# Patient Record
Sex: FEMALE | Race: BLACK OR AFRICAN AMERICAN | NOT HISPANIC OR LATINO | Employment: FULL TIME | URBAN - METROPOLITAN AREA
[De-identification: names, ages, dates, MRNs, and addresses within clinical notes are randomized per-mention and may not be internally consistent; named-entity substitution may affect disease eponyms.]

---

## 2021-06-05 ENCOUNTER — TRANSFERRED RECORDS (OUTPATIENT)
Dept: HEALTH INFORMATION MANAGEMENT | Facility: CLINIC | Age: 21
End: 2021-06-05

## 2021-06-05 ENCOUNTER — NURSE TRIAGE (OUTPATIENT)
Dept: NURSING | Facility: CLINIC | Age: 21
End: 2021-06-05

## 2021-06-05 ENCOUNTER — AMBULATORY - HEALTHEAST (OUTPATIENT)
Dept: CARE COORDINATION | Facility: HOSPITAL | Age: 21
End: 2021-06-05

## 2021-06-05 ENCOUNTER — HOSPITAL ENCOUNTER (EMERGENCY)
Dept: EMERGENCY MEDICINE | Facility: CLINIC | Age: 21
Discharge: PSYCHIATRIC OR SUBSTANCE USE FACILITY | End: 2021-06-06
Attending: EMERGENCY MEDICINE
Payer: COMMERCIAL

## 2021-06-05 DIAGNOSIS — T50.902A OVERDOSE, INTENTIONAL SELF-HARM, INITIAL ENCOUNTER (H): ICD-10-CM

## 2021-06-05 DIAGNOSIS — R45.851 SUICIDAL IDEATION: ICD-10-CM

## 2021-06-05 LAB
ALBUMIN SERPL-MCNC: 3.6 G/DL (ref 3.5–5)
ALP SERPL-CCNC: 66 U/L (ref 45–120)
ALT SERPL W P-5'-P-CCNC: 13 U/L (ref 0–45)
ALT SERPL-CCNC: 13 U/L (ref 0–45)
ANION GAP SERPL CALCULATED.3IONS-SCNC: 9 MMOL/L (ref 5–18)
APAP SERPL-MCNC: <3 UG/ML (ref 10–20)
AST SERPL W P-5'-P-CCNC: 13 U/L (ref 0–40)
AST SERPL-CCNC: 13 U/L (ref 0–40)
ATRIAL RATE - MUSE: 110 BPM
BASOPHILS # BLD AUTO: 0.1 THOU/UL (ref 0–0.2)
BASOPHILS NFR BLD AUTO: 1 % (ref 0–2)
BILIRUB SERPL-MCNC: 0.5 MG/DL (ref 0–1)
BUN SERPL-MCNC: 6 MG/DL (ref 8–22)
CALCIUM SERPL-MCNC: 8.9 MG/DL (ref 8.5–10.5)
CHLORIDE BLD-SCNC: 111 MMOL/L (ref 98–107)
CO2 SERPL-SCNC: 22 MMOL/L (ref 22–31)
CREAT SERPL-MCNC: 0.81 MG/DL (ref 0.6–1.1)
CREAT SERPL-MCNC: 0.81 MG/DL (ref 0.6–1.1)
DIASTOLIC BLOOD PRESSURE - MUSE: 93 MMHG
EOSINOPHIL # BLD AUTO: 0.2 THOU/UL (ref 0–0.4)
EOSINOPHIL NFR BLD AUTO: 3 % (ref 0–6)
ERYTHROCYTE [DISTWIDTH] IN BLOOD BY AUTOMATED COUNT: 12.9 % (ref 11–14.5)
ETHANOL SERPL-MCNC: <10 MG/DL
GFR SERPL CREATININE-BSD FRML MDRD: >60 ML/MIN/1.73M2
GFR SERPL CREATININE-BSD FRML MDRD: >60 ML/MIN/1.73M2
GLUCOSE BLD-MCNC: 116 MG/DL (ref 70–125)
GLUCOSE SERPL-MCNC: 116 MG/DL (ref 70–120)
HCG SERPL QL: NEGATIVE
HCT VFR BLD AUTO: 44.1 % (ref 35–47)
HGB BLD-MCNC: 14.6 G/DL (ref 12–16)
IMM GRANULOCYTES # BLD: 0 THOU/UL
IMM GRANULOCYTES NFR BLD: 0 %
INTERPRETATION ECG - MUSE: NORMAL
LIPASE SERPL-CCNC: 22 U/L (ref 0–52)
LYMPHOCYTES # BLD AUTO: 2.8 THOU/UL (ref 0.8–4.4)
LYMPHOCYTES NFR BLD AUTO: 38 % (ref 20–40)
MCH RBC QN AUTO: 29.3 PG (ref 27–34)
MCHC RBC AUTO-ENTMCNC: 33.1 G/DL (ref 32–36)
MCV RBC AUTO: 89 FL (ref 80–100)
MONOCYTES # BLD AUTO: 0.5 THOU/UL (ref 0–0.9)
MONOCYTES NFR BLD AUTO: 7 % (ref 2–10)
NEUTROPHILS # BLD AUTO: 3.8 THOU/UL (ref 2–7.7)
NEUTROPHILS NFR BLD AUTO: 51 % (ref 50–70)
P AXIS - MUSE: 25 DEGREES
PLATELET # BLD AUTO: 254 THOU/UL (ref 140–440)
PMV BLD AUTO: 11 FL (ref 8.5–12.5)
POTASSIUM BLD-SCNC: 3.5 MMOL/L (ref 3.5–5)
POTASSIUM SERPL-SCNC: 3.5 MMOL/L (ref 3.5–5)
PR INTERVAL - MUSE: 130 MS
PROT SERPL-MCNC: 7.2 G/DL (ref 6–8)
QRS DURATION - MUSE: 86 MS
QT - MUSE: 336 MS
QTC - MUSE: 454 MS
R AXIS - MUSE: 43 DEGREES
RBC # BLD AUTO: 4.98 MILL/UL (ref 3.8–5.4)
SALICYLATE LEVEL - HISTORICAL: <8 MG/DL (ref 2–25)
SARS-COV-2 PCR RESULT-HE - HISTORICAL: NEGATIVE
SODIUM SERPL-SCNC: 142 MMOL/L (ref 136–145)
SYSTOLIC BLOOD PRESSURE - MUSE: 141 MMHG
T AXIS - MUSE: 17 DEGREES
VENTRICULAR RATE- MUSE: 110 BPM
WBC: 7.5 THOU/UL (ref 4–11)

## 2021-06-05 ASSESSMENT — MIFFLIN-ST. JEOR: SCORE: 1781.01

## 2021-06-06 ENCOUNTER — HOSPITAL ENCOUNTER (INPATIENT)
Facility: CLINIC | Age: 21
LOS: 1 days | Discharge: HOME | End: 2021-06-07
Attending: PSYCHIATRY & NEUROLOGY | Admitting: PSYCHIATRY & NEUROLOGY
Payer: COMMERCIAL

## 2021-06-06 ENCOUNTER — COMMUNICATION - HEALTHEAST (OUTPATIENT)
Dept: SCHEDULING | Facility: CLINIC | Age: 21
End: 2021-06-06

## 2021-06-06 DIAGNOSIS — F32.2 MAJOR DEPRESSIVE DISORDER, SINGLE EPISODE, SEVERE WITHOUT PSYCHOSIS (H): ICD-10-CM

## 2021-06-06 DIAGNOSIS — F41.9 ANXIETY: Primary | ICD-10-CM

## 2021-06-06 PROBLEM — R45.89 SUICIDAL BEHAVIOR: Status: ACTIVE | Noted: 2021-06-06

## 2021-06-06 LAB
ALBUMIN SERPL-MCNC: 3.5 G/DL (ref 3.4–5)
ALP SERPL-CCNC: 70 U/L (ref 40–150)
ALT SERPL W P-5'-P-CCNC: 18 U/L (ref 0–50)
ANION GAP SERPL CALCULATED.3IONS-SCNC: 10 MMOL/L (ref 3–14)
AST SERPL W P-5'-P-CCNC: 15 U/L (ref 0–45)
BASOPHILS # BLD AUTO: 0.1 10E9/L (ref 0–0.2)
BASOPHILS NFR BLD AUTO: 1.2 %
BILIRUB SERPL-MCNC: 0.6 MG/DL (ref 0.2–1.3)
BUN SERPL-MCNC: 7 MG/DL (ref 7–30)
CALCIUM SERPL-MCNC: 9.2 MG/DL (ref 8.5–10.1)
CHLORIDE SERPL-SCNC: 108 MMOL/L (ref 94–109)
CHOLEST SERPL-MCNC: 143 MG/DL
CO2 SERPL-SCNC: 20 MMOL/L (ref 20–32)
CREAT SERPL-MCNC: 0.82 MG/DL (ref 0.52–1.04)
DIFFERENTIAL METHOD BLD: NORMAL
EOSINOPHIL # BLD AUTO: 0.1 10E9/L (ref 0–0.7)
EOSINOPHIL NFR BLD AUTO: 2.4 %
ERYTHROCYTE [DISTWIDTH] IN BLOOD BY AUTOMATED COUNT: 12.8 % (ref 10–15)
GFR SERPL CREATININE-BSD FRML MDRD: >90 ML/MIN/{1.73_M2}
GLUCOSE SERPL-MCNC: 82 MG/DL (ref 70–99)
HCT VFR BLD AUTO: 45.8 % (ref 35–47)
HDLC SERPL-MCNC: 57 MG/DL
HGB BLD-MCNC: 15 G/DL (ref 11.7–15.7)
IMM GRANULOCYTES # BLD: 0 10E9/L (ref 0–0.4)
IMM GRANULOCYTES NFR BLD: 0.2 %
LDLC SERPL CALC-MCNC: 73 MG/DL
LYMPHOCYTES # BLD AUTO: 1.9 10E9/L (ref 0.8–5.3)
LYMPHOCYTES NFR BLD AUTO: 32.2 %
MCH RBC QN AUTO: 29.2 PG (ref 26.5–33)
MCHC RBC AUTO-ENTMCNC: 32.8 G/DL (ref 31.5–36.5)
MCV RBC AUTO: 89 FL (ref 78–100)
MONOCYTES # BLD AUTO: 0.6 10E9/L (ref 0–1.3)
MONOCYTES NFR BLD AUTO: 9.9 %
NEUTROPHILS # BLD AUTO: 3.1 10E9/L (ref 1.6–8.3)
NEUTROPHILS NFR BLD AUTO: 54.1 %
NONHDLC SERPL-MCNC: 86 MG/DL
NRBC # BLD AUTO: 0 10*3/UL
NRBC BLD AUTO-RTO: 0 /100
PLATELET # BLD AUTO: 261 10E9/L (ref 150–450)
POTASSIUM SERPL-SCNC: 3.6 MMOL/L (ref 3.4–5.3)
PROT SERPL-MCNC: 7.7 G/DL (ref 6.8–8.8)
RBC # BLD AUTO: 5.13 10E12/L (ref 3.8–5.2)
SODIUM SERPL-SCNC: 138 MMOL/L (ref 133–144)
T4 FREE SERPL-MCNC: 1.05 NG/DL (ref 0.76–1.46)
TRIGL SERPL-MCNC: 66 MG/DL
TSH SERPL DL<=0.005 MIU/L-ACNC: 0.32 MU/L (ref 0.4–4)
VIT B12 SERPL-MCNC: 566 PG/ML (ref 193–986)
WBC # BLD AUTO: 5.8 10E9/L (ref 4–11)

## 2021-06-06 PROCEDURE — 99223 1ST HOSP IP/OBS HIGH 75: CPT | Mod: AI | Performed by: NURSE PRACTITIONER

## 2021-06-06 PROCEDURE — 124N000002 HC R&B MH UMMC

## 2021-06-06 PROCEDURE — 82306 VITAMIN D 25 HYDROXY: CPT | Performed by: PSYCHIATRY & NEUROLOGY

## 2021-06-06 PROCEDURE — 85025 COMPLETE CBC W/AUTO DIFF WBC: CPT | Performed by: PSYCHIATRY & NEUROLOGY

## 2021-06-06 PROCEDURE — 82607 VITAMIN B-12: CPT | Performed by: PSYCHIATRY & NEUROLOGY

## 2021-06-06 PROCEDURE — 36415 COLL VENOUS BLD VENIPUNCTURE: CPT | Performed by: PSYCHIATRY & NEUROLOGY

## 2021-06-06 PROCEDURE — 80061 LIPID PANEL: CPT | Performed by: PSYCHIATRY & NEUROLOGY

## 2021-06-06 PROCEDURE — 84439 ASSAY OF FREE THYROXINE: CPT | Performed by: PSYCHIATRY & NEUROLOGY

## 2021-06-06 PROCEDURE — 84443 ASSAY THYROID STIM HORMONE: CPT | Performed by: PSYCHIATRY & NEUROLOGY

## 2021-06-06 PROCEDURE — 82746 ASSAY OF FOLIC ACID SERUM: CPT | Performed by: NURSE PRACTITIONER

## 2021-06-06 PROCEDURE — 80053 COMPREHEN METABOLIC PANEL: CPT | Performed by: PSYCHIATRY & NEUROLOGY

## 2021-06-06 RX ORDER — TRETINOIN 0.5 MG/G
1 CREAM TOPICAL AT BEDTIME
COMMUNITY
End: 2023-05-29

## 2021-06-06 RX ORDER — ALBUTEROL SULFATE 90 UG/1
2 AEROSOL, METERED RESPIRATORY (INHALATION) EVERY 6 HOURS PRN
Status: DISCONTINUED | OUTPATIENT
Start: 2021-06-06 | End: 2021-06-07 | Stop reason: HOSPADM

## 2021-06-06 RX ORDER — GABAPENTIN 100 MG/1
100-300 CAPSULE ORAL 3 TIMES DAILY PRN
Status: DISCONTINUED | OUTPATIENT
Start: 2021-06-06 | End: 2021-06-07

## 2021-06-06 RX ORDER — OLANZAPINE 5 MG/1
5-10 TABLET ORAL 3 TIMES DAILY PRN
Status: DISCONTINUED | OUTPATIENT
Start: 2021-06-06 | End: 2021-06-07

## 2021-06-06 RX ORDER — HYDROXYZINE HYDROCHLORIDE 25 MG/1
25-50 TABLET, FILM COATED ORAL 3 TIMES DAILY PRN
Status: ON HOLD | COMMUNITY
End: 2021-06-07

## 2021-06-06 RX ORDER — ACETAMINOPHEN 325 MG/1
650 TABLET ORAL EVERY 4 HOURS PRN
Status: DISCONTINUED | OUTPATIENT
Start: 2021-06-06 | End: 2021-06-07 | Stop reason: HOSPADM

## 2021-06-06 RX ORDER — CLINDAMYCIN PHOSPHATE 11.9 MG/ML
1 SOLUTION TOPICAL 2 TIMES DAILY
Status: ON HOLD | COMMUNITY
End: 2021-06-06

## 2021-06-06 RX ORDER — SERTRALINE HYDROCHLORIDE 100 MG/1
100 TABLET, FILM COATED ORAL DAILY
Status: ON HOLD | COMMUNITY
End: 2021-06-07

## 2021-06-06 RX ORDER — CLINDAMYCIN PHOSPHATE 10 UG/ML
1 LOTION TOPICAL DAILY PRN
COMMUNITY

## 2021-06-06 RX ORDER — OLANZAPINE 10 MG/2ML
5-10 INJECTION, POWDER, FOR SOLUTION INTRAMUSCULAR 3 TIMES DAILY PRN
Status: DISCONTINUED | OUTPATIENT
Start: 2021-06-06 | End: 2021-06-07

## 2021-06-06 RX ORDER — MAGNESIUM HYDROXIDE/ALUMINUM HYDROXICE/SIMETHICONE 120; 1200; 1200 MG/30ML; MG/30ML; MG/30ML
30 SUSPENSION ORAL EVERY 4 HOURS PRN
Status: DISCONTINUED | OUTPATIENT
Start: 2021-06-06 | End: 2021-06-07 | Stop reason: HOSPADM

## 2021-06-06 ASSESSMENT — ACTIVITIES OF DAILY LIVING (ADL)
ORAL_HYGIENE: INDEPENDENT
LAUNDRY: WITH SUPERVISION
HYGIENE/GROOMING: INDEPENDENT
DRESS: INDEPENDENT
LAUNDRY: WITH SUPERVISION
ORAL_HYGIENE: INDEPENDENT
HYGIENE/GROOMING: HANDWASHING;SHOWER;INDEPENDENT
DRESS: SCRUBS (BEHAVIORAL HEALTH)
HYGIENE/GROOMING: INDEPENDENT

## 2021-06-06 ASSESSMENT — MIFFLIN-ST. JEOR: SCORE: 1769.38
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
## 2021-06-06 NOTE — PLAN OF CARE
"Pt admitted to unit 4a for SA by overdose. Per report, Pt ingested 21 Zoloft tabs and 11 of her hydroxyzine tablets. Utox and Covid negative. Pt was medically cleared, beside tachycardia VSS, EKG performed in ER. Pt is voluntary, does not want Mom to know of admit, reports Mom as her trigger. This is Pt's first IP.     Pt presents as blunted, irritable, calm. Pt denied SI, SIB, HI, A/V/H. Pt admitted to past SI and SA but did not want to discuss further. Pt stated \"I just want to get out of here as soon as possible and get therapy.\" Pt was not interested in medications and did not feel her Zoloft helped her. Pt stated in she stopped taking her Zoloft in April when she met with her psychologist and endorsed ambivalence about self harm, which led to getting admitted to [facility]. Pt stated she was evaluated and went home and saw no benefit in the encounter. Pt has not received mental health support since. Pt states her biggest stressor is Mom's excessive shopping habits and items crowding the home and needing to get out of the unhealthy environment.     Pt placed on SI precautions. Pt contracted for safety on the unit. She reported smoking tobacco and drinking 2-3 beers daily for the past two months, MD notified and nicotine gum ordered PRN. 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 PTA Zoloft and Visceral not ordered at this time. Pt oriented to unit, currently resting in room.   "

## 2021-06-06 NOTE — PHARMACY-ADMISSION MEDICATION HISTORY
Admission Medication History Completed by Pharmacy    See [facility] Admission Navigator for allergy information, preferred outpatient pharmacy and prior to admission medications.     Medication History Sources:     Care Everywhere: [facility] appts in April    Prescription fill history via Epic Surescripts data -- not available, pt has not signed consent    Patient (via phone on unit 4A)    Additional Information:    All medications added based on clinic notes and is consistent with patient's report.     Patient states she self-stopped sertraline and hydroxyzine about one month ago, but overdosed on both on 6/5     Patient aware topical acne products are non-formulary and can either use home supply or will be held during hospitalization.     Prior to Admission medications    Medication Sig Last Dose     benzoyl peroxide 10 % BAR Apply 1 applicator topically every morning Past Week         clindamycin (CLEOCIN T) 1 % external lotion Apply 1 g topically every morning Past Week         hydrOXYzine (ATARAX) 25 MG tablet Take 25-50 mg by mouth 3 times daily as needed for anxiety     6/5 overdose     sertraline (ZOLOFT) 100 MG tablet Take 100 mg by mouth daily 6/5 overdose         tretinoin (RETIN-A) 0.05 % external cream Apply 1 g topically At Bedtime Past Week           Date completed: 06/06/21    Medication history completed by:   Joyce Fletcher, Pharm.D., [facility]  Behavioral Health Inpatient Pharmacist  [facility] ([facility])  Phone: *[phone] ([employer]) or [phone]

## 2021-06-06 NOTE — PROGRESS NOTES
06/06/21 0207   Patient Belongings   Did you bring any home meds/supplements to the hospital?  No   Patient Belongings other (see comments)  (Patient belongings put in belongings bin.)   Belongings Search Yes   Clothing Search Yes   Second Staff Judith LARIOS     Patient Belongings:  White Bra-1  Aqua/Red Nike-1 pair  Pink Sweatpants-1 pair  Cell Phone-1  Black Northface Sweatshirt-1  Black Yacket T-Shirt-1  [employer] ID-1  [employer] Rewards Card  [employer] DSP ID-1  Earrings-1 Pair  Stone Bracelet-1  Black/Gray Wallet-1  Hair tie Bands-2 pair    Sent to Security:  [place] DL 0314  [employer] Gift Card [id number]  [facility] Debit [phone]  [employer] Debit [phone]  [employer] Visa Debit [phone]    A               Admission:  I am responsible for any personal items that are not sent to the safe or pharmacy.  [facility] is not responsible for loss, theft or damage of any property in my possession.    Signature:  _________________________________ Date: _______  Time: _____                                              Staff Signature:  ____________________________ Date: ________  Time: _____      2nd Staff person, if patient is unable/unwilling to sign:    Signature: ________________________________ Date: ________  Time: _____     Discharge:  [facility] has returned all of my personal belongings:    Signature: _________________________________ Date: ________  Time: _____                                          Staff Signature:  ____________________________ Date: ________  Time: _____

## 2021-06-06 NOTE — PLAN OF CARE
"Patient up and in the lounge at start of shift.  Tense affect, irritable.  She began our conversation with complaints about night staff.  Patient reoriented to unit.  She presented with a lengthy presentation of conflict with her mother, her god-mother, not having friends.  Reports that she does not talk with her mother, and now her god-mother.  Acknowledged her sense of isolation.  Also acknowledged her use of beer to mange anger/ and frustration. (reports 2-3 daily).    0830:  Currently denies SI/SIB.  Rates depression as 5, anxiety as 8 (in course of her normal day).  Denies hallucinations.    Does not want to take medications.     Advised to work on safety plan.    1130: Now quite angry because she is not being discharged.  \"Don't come into my room to talk to me unless you are telling me I am going to be discharged\"--then went to her room and slammed the door.    1215: she come out of her room and made a phone call.  Took her lunch to her room.  "
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
## 2021-06-06 NOTE — TELEPHONE ENCOUNTER
Pt. Calls and says that she was feeling suicidal today and took 23 tablets of Zoloft and 11 tablets of Hydroxyzine at 3 pm today. Pt. Also took 2 Excedrin Migraine tablets. Pt. Refuses to call 911 and says that her mother is driving her to the [facility] ER now. Pt. Feels very dizzy, exhausted, and says that bright lights hurt her eyes. Pt. Says that she cannot walk. Pt. Says that they are now at the ER and pt. Is being registered, in the ER. COVID 19 Nurse Triage Plan/Patient Instructions    Please be aware that novel coronavirus (COVID-19) may be circulating in the community. If you develop symptoms such as fever, cough, or SOB or if you have concerns about the presence of another infection including coronavirus (COVID-19), please contact your health care provider or visit [email or web address].     Disposition/Instructions    Call to EMS/911 recommended. Follow protocol based instructions.     Bring Your Own Device:  Please also bring your smart device(s) (smart phones, tablets, laptops) and their charging cables for your personal use and to communicate with your care team during your visit.    Thank you for taking steps to prevent the spread of this virus.  o Limit your contact with others.  o Wear a simple mask to cover your cough.  o Wash your hands well and often.    Resources    [facility]: About COVID-19: [email or web address]    CDC: What to Do If You're Sick: www.cdc.gov/coronavirus/2019-ncov/about/steps-when-sick.html    CDC: Ending Home Isolation: www.cdc.gov/coronavirus/2019-ncov/hcp/disposition-in-home-patients.html     CDC: Caring for Someone: www.cdc.gov/coronavirus/2019-ncov/if-you-are-sick/care-for-someone.html     [facility]: Interim Guidance for Hospital Discharge to Home: www.health.[facility].[place]/diseases/coronavirus/hcp/hospdischarge.pdf    [facility] clinical trials (COVID-19 research studies): clinicalaffairs.[facility]-clinical-trials     Below  are the COVID-19 hotlines at the [place] Department of Health ([facility]). Interpreters are available.   o For health questions: Call 208-930-0300 or 1-615.788.4412 (7 a.m. to 7 p.m.)  o For questions about schools and childcare: Call 828-203-8973 or 1-307.641.2233 (7 a.m. to 7 p.m.)

## 2021-06-06 NOTE — PROGRESS NOTES
"Pt requested to talk with Writer shortly upon waking at 0715. Pt expressed irritability and hopelessness stating repeatedly, \"I learned my lesson, being here really opened my eyes, I don't need this I just need to go home.\" Writer actively listened to Pt and validated their emotional responses, concerns. Writer reminded Pt of the substantial ingestion the previous day, Pt was accepting and insightful of this but affirmed they did not endorse any further suicidality, that they wanted to live and had future oriented plans moving forward. Pt asked what the process would be to leave as soon as they could because \"this place rubbed me wrong already last night.\" Wrtier provided options on waiting for the MD to round this AM and have this discussion with them, or pursue the 12 hour intent, Writer was candid on the 72 hour process that can result from this. Pt verbalized understanding but appeared tearful stating, \"it sounds to me my options are very limited.\" Pt also provided appropriate insight that if they get upset over this their behaviors could be perceived as a reason to stay IP longer. Pt agreed to wait to talk to the provider, thanked Writer and remained calmly sitting in the lounge. Will report to AM shift.   "

## 2021-06-06 NOTE — CONFIDENTIAL NOTE
Luisanaor calls and says that she took 23 tablets of Zoloft and 11 tablets of Hydroxyzine at 3 pm today. Pt. Says that she took those pills because she was feeling suicidal. Pt. Says that she is on the way to the [facility] ER now, with her mother. Mother is driving the car. Pt. Says that she has no sharp objects with her. Pt. Says that she did not want this nurse to call 911 and refused to pull over and call 911. RN had pt. Make a verbal contract with this nurse that pt. Will not harm herself as she goes to the [facility] ER, with her mother. Pt. Says that she will not harm herself. Pt. Says that she is very dizzy and cannot walk. Pt. Also says that her eyes are very sensitive to bright lights. Pt. Says that she is exhausted. Pt. Says that she also took 2 Excedin Migraine pills this afternoon. Pt. Says that she is now at the ER and is in a wheelchair and is being registered. COVID 19 Nurse Triage Plan/Patient Instructions    Please be aware that novel coronavirus (COVID-19) may be circulating in the community. If you develop symptoms such as fever, cough, or SOB or if you have concerns about the presence of another infection including coronavirus (COVID-19), please contact your health care provider or visit [email or web address].     Disposition/Instructions    Call to EMS/911 recommended. Follow protocol based instructions.     Bring Your Own Device:  Please also bring your smart device(s) (smart phones, tablets, laptops) and their charging cables for your personal use and to communicate with your care team during your visit.    Thank you for taking steps to prevent the spread of this virus.  o Limit your contact with others.  o Wear a simple mask to cover your cough.  o Wash your hands well and often.    Resources    [facility]: About COVID-19: [email or web address]    CDC: What to Do If You're Sick:  www.cdc.gov/coronavirus/2019-ncov/about/steps-when-sick.html    CDC: Ending Home Isolation: www.cdc.gov/coronavirus/2019-ncov/hcp/disposition-in-home-patients.html     CDC: Caring for Someone: www.cdc.gov/coronavirus/2019-ncov/if-you-are-sick/care-for-someone.html     [facility]: Interim Guidance for Hospital Discharge to Home: www.[facility].[place]./diseases/coronavirus/hcp/hospdischarge.pdf    [facility] clinical trials (COVID-19 research studies): clinicalaffairs.[facility]-clinical-trials     Below are the COVID-19 hotlines at the [place] Department of Health ([facility]). Interpreters are available.   o For health questions: Call 803-295-9160 or 1-470.570.3053 (7 a.m. to 7 p.m.)  o For questions about schools and childcare: Call 859-213-8688 or 1-469.144.9990 (7 a.m. to 7 p.m.)

## 2021-06-07 VITALS
OXYGEN SATURATION: 99 % | TEMPERATURE: 96.3 F | DIASTOLIC BLOOD PRESSURE: 78 MMHG | BODY MASS INDEX: 31.02 KG/M2 | HEIGHT: 69 IN | SYSTOLIC BLOOD PRESSURE: 130 MMHG | RESPIRATION RATE: 16 BRPM | HEART RATE: 99 BPM | WEIGHT: 209.44 LBS

## 2021-06-07 LAB — DEPRECATED CALCIDIOL+CALCIFEROL SERPL-MC: 10 UG/L (ref 20–75)

## 2021-06-07 PROCEDURE — 99239 HOSP IP/OBS DSCHRG MGMT >30: CPT | Performed by: CLINICAL NURSE SPECIALIST

## 2021-06-07 PROCEDURE — 250N000013 HC RX MED GY IP 250 OP 250 PS 637: Performed by: NURSE PRACTITIONER

## 2021-06-07 RX ORDER — HYDROXYZINE HYDROCHLORIDE 25 MG/1
25 TABLET, FILM COATED ORAL EVERY 6 HOURS PRN
Status: DISCONTINUED | OUTPATIENT
Start: 2021-06-07 | End: 2021-06-07

## 2021-06-07 RX ORDER — HYDROXYZINE HYDROCHLORIDE 50 MG/1
50 TABLET, FILM COATED ORAL EVERY 6 HOURS PRN
Status: DISCONTINUED | OUTPATIENT
Start: 2021-06-09 | End: 2021-06-07

## 2021-06-07 RX ORDER — HYDROXYZINE HYDROCHLORIDE 25 MG/1
25 TABLET, FILM COATED ORAL EVERY 6 HOURS PRN
Status: DISCONTINUED | OUTPATIENT
Start: 2021-06-09 | End: 2021-06-07 | Stop reason: HOSPADM

## 2021-06-07 RX ORDER — HYDROXYZINE HYDROCHLORIDE 25 MG/1
25 TABLET, FILM COATED ORAL EVERY 6 HOURS PRN
Qty: 120 TABLET | Refills: 1 | Status: SHIPPED | OUTPATIENT
Start: 2021-06-09 | End: 2023-05-29

## 2021-06-07 RX ORDER — HYDROXYZINE HYDROCHLORIDE 50 MG/1
50 TABLET, FILM COATED ORAL EVERY 6 HOURS PRN
Status: DISCONTINUED | OUTPATIENT
Start: 2021-06-07 | End: 2021-06-07

## 2021-06-07 RX ADMIN — ALUMINUM HYDROXIDE, MAGNESIUM HYDROXIDE, AND SIMETHICONE 30 ML: 200; 200; 20 SUSPENSION ORAL at 02:49

## 2021-06-07 RX ADMIN — ALUMINUM HYDROXIDE, MAGNESIUM HYDROXIDE, AND SIMETHICONE 30 ML: 200; 200; 20 SUSPENSION ORAL at 11:58

## 2021-06-07 ASSESSMENT — ACTIVITIES OF DAILY LIVING (ADL)
DRESS: SCRUBS (BEHAVIORAL HEALTH)
LAUNDRY: WITH SUPERVISION
ORAL_HYGIENE: INDEPENDENT
HYGIENE/GROOMING: INDEPENDENT
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
## 2021-06-07 NOTE — PLAN OF CARE
Patient stayed in room most of the time this shift.  Tense and irritable at times.  Flat affect, denies SI/SIB, depression/anxiety.  Patient has a discharge order.  Mom to  at 6pm when coming from work.  Patient currently taking nap in room, will continue to monitor closely.

## 2021-06-07 NOTE — PLAN OF CARE
"  Problem: Suicide Risk  Goal: Absence of Self-Harm  Note: Pt had an unremarkable shift. Writer introduced self to pt at the beginning of the shift and pt requested to speak with writer. Pt expressed frustration about discussion with provider, but reports acceptance at this time. Pt states she has time to sit with it and is feeling grateful about admission, despite not wanting to be here. Pt denies current SI, SIB and hallucinations. Pt states she was \"triggered,\" which was why she overdosed. Pt states she is not interested in medications, but is very interested in having a regular therapist. Pt states that insurance has been a barrier to having  a therapist in the past. Pt ate dinner and showered this evening.Pt has otherwise been mostly withdrawn and isolative, napping in her room, but she has been pleasant and cooperative on approach. Pt denies any other concerns at this time.      "

## 2021-06-07 NOTE — PROGRESS NOTES
Pt has not attended scheduled occupational therapy sessions. Encourage attendance and participation. Pt will be given self-assessment form, and OT staff will explain the purpose of including them in their treatment plan and offer options for meeting their needs.

## 2021-06-07 NOTE — PLAN OF CARE
BEHAVIORAL TEAM DISCUSSION    Participants: Debra Naegele, RN Mary Ellen, CTC Amanda Gamez  Progress:new admission  Anticipated length of stay:3-4 days  Continued Stay Criteria/Rationale: needs further assessment  Medical/Physical: see IM consult  Precautions:   Behavioral Orders   Procedures     Code 1 - Restrict to Unit     Routine Programming     As clinically indicated     Status 15     Every 15 minutes.     Suicide precautions     Patients on Suicide Precautions should have a Combination Diet ordered that includes a Diet selection(s) AND a Behavioral Tray selection for Safe Tray - with utensils, or Safe Tray - NO utensils       Plan: medications to be managed and assessment by psychiatry, staff to provide safe environment and therapeutic milieu, CTC to meet with pt to discuss discharge plans and assist when needed.  Rationale for change in precautions or plan: no change

## 2021-06-07 NOTE — PLAN OF CARE
Problem: General Plan of Care (Inpatient Behavioral)  Goal: Individualization/Patient Specific Goal (IP Behavioral)  Description: The patient and/or their representative will achieve their patient-specific goals related to the plan of care.    The patient-specific goals include:  Flowsheets (Taken 6/7/2021 1102)  Areas of Vulnerability: stopped medication in january,  community not supportive of MH,  Patient Strengths: Stable housing

## 2021-06-07 NOTE — PROGRESS NOTES
Pt endorsed stomach discomfort and stated it was 2 days since LBM, Pt accepting of PRN maalox for discomfort and will request further PRN bowel med in AM.

## 2021-06-07 NOTE — PROGRESS NOTES
Pt discharged to home via private car (Mom). Denies SI/HI/SIB. Belongings accounted for and returned to pt. Discharge summary reviewed. Outpatient crisis resources identified. Able to identify sources of support and effective coping skills. Verbalizes understanding of all discharge instructions, recommendations and medications.

## 2021-06-07 NOTE — DISCHARGE INSTRUCTIONS
Behavioral Discharge Planning and Instructions    Summary: You were admitted on 6/6/2021 due to Suicide Attempt.  You were treated by Debra Naegele, APRN and discharged on 6/7/2021 from 4A to Home    Main Diagnosis:   Major depressive disorder  Generalized anxiety disorder    Health Care Follow-up: [facility]- they do have psychological testing but no openings at this time. Please address this with your provider when you meet.  Appointment Date/Time: Tuesday June 15th @ 10:30  Psychiatrist/Primary Care Giver: Amy Schwab  Address: [address] [place]  Phone Number: [phone]  Fax: [phone] -In Person      Appointment Date/Time: Monday July 26th @ 10:00 Therapist: Nubia Garrett  Address: [address], [address] [facility] [address]   Phone Number: [phone]      [facility] program Intake  Appointment Date/Time: Thursday June10th @ 10:30 with Jennifer Solis  Address: telephone intake   Phone Number: [phone]        Attend all scheduled appointments with your outpatient providers. Call at least 24 hours in advance if you need to reschedule an appointment to ensure continued access to your outpatient providers.     Major Treatments, Procedures and Findings:  You were provided with: a psychiatric assessment, assessed for medical stability, medication evaluation and/or management and milieu management    Symptoms to Report: feeling more aggressive, increased confusion, losing more sleep, mood getting worse or thoughts of suicide    Early warning signs can include: increased depression or anxiety sleep disturbances increased thoughts or behaviors of suicide or self-harm  increased unusual thinking, such as paranoia or hearing voices    Safety and Wellness:  Take all medicines as directed.  Make no changes unless your doctor suggests them.      Follow treatment recommendations.  Refrain from alcohol and non-prescribed drugs.  If there is a concern for safety, call 911.    Resources:   Crisis  "Intervention: 343.577.5701 or 352-829-3706 (TTY: 822.790.9798).  Call anytime for help.  National [place] on Mental Illness (www.[place].christine.org): 716.952.1101 or 221-982-2765.  [facility] [place] Crisis (COPE) Response - Adult 402 289-5051  Text 4 Life: txt \"LIFE\" to 58084 for immediate support and crisis intervention  Crisis text line: Text \"[place]\" to 875805. Free, confidential, 24/7.    General Medication Instructions:   See your medication sheet(s) for instructions.   Take all medicines as directed.  Make no changes unless your doctor suggests them.   Go to all your doctor visits.  Be sure to have all your required lab tests. This way, your medicines can be refilled on time.  Do not use any drugs not prescribed by your doctor.  Avoid alcohol.    Advance Directives:   Scanned document on file with [facility]? No scanned doc  Is document scanned? Pt unable to confirm  Honoring Choices Your Rights Handout: Informed and given  Was more information offered? Pt declined    The Treatment team has appreciated the opportunity to work with you. If you have any questions or concerns about your recent admission, you can contact the unit which can receive your call 24 hours a day, 7 days a week. They will be able to get in touch with a Provider if needed. The unit number is [phone].  "

## 2021-06-10 ENCOUNTER — HOSPITAL ENCOUNTER (OUTPATIENT)
Dept: BEHAVIORAL HEALTH | Facility: CLINIC | Age: 21
Discharge: HOME | End: 2021-06-10
Attending: FAMILY MEDICINE | Admitting: FAMILY MEDICINE
Payer: COMMERCIAL

## 2021-06-10 PROCEDURE — 90791 PSYCH DIAGNOSTIC EVALUATION: CPT | Mod: TEL | Performed by: COUNSELOR

## 2021-06-10 ASSESSMENT — ANXIETY QUESTIONNAIRES
IF YOU CHECKED OFF ANY PROBLEMS ON THIS QUESTIONNAIRE, HOW DIFFICULT HAVE THESE PROBLEMS MADE IT FOR YOU TO DO YOUR WORK, TAKE CARE OF THINGS AT HOME, OR GET ALONG WITH OTHER PEOPLE: SOMEWHAT DIFFICULT
3. WORRYING TOO MUCH ABOUT DIFFERENT THINGS: MORE THAN HALF THE DAYS
7. FEELING AFRAID AS IF SOMETHING AWFUL MIGHT HAPPEN: NOT AT ALL
1. FEELING NERVOUS, ANXIOUS, OR ON EDGE: MORE THAN HALF THE DAYS
5. BEING SO RESTLESS THAT IT IS HARD TO SIT STILL: MORE THAN HALF THE DAYS
6. BECOMING EASILY ANNOYED OR IRRITABLE: MORE THAN HALF THE DAYS
2. NOT BEING ABLE TO STOP OR CONTROL WORRYING: MORE THAN HALF THE DAYS
GAD7 TOTAL SCORE: 12

## 2021-06-10 ASSESSMENT — PATIENT HEALTH QUESTIONNAIRE - PHQ9
SUM OF ALL RESPONSES TO PHQ QUESTIONS 1-9: 18
5. POOR APPETITE OR OVEREATING: MORE THAN HALF THE DAYS

## 2021-06-10 NOTE — PROGRESS NOTES
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 amounts of the substance to achieve the desired effect or a markedly diminished effect with continued use of the dame amount of the substance.  Met for:  Alcohol and Cannabis    Functional Status:  Patient reports the following functional impairments: relationship(s), self-care and work / vocational responsibilities.     WHODAS:   WHODAS 2.0 Total Score 6/10/2021   Total Score 28     Programmatic care:  Current LOCUS was assessed and patient needs the following level of care based on score 19  .  Clinical Summary:  1. Reason for assessment: DDP .  2. Psychosocial, Cultural and Contextual Factors:Relationship strain with mother, recently broke up with on and off boyfriend, work stressors. Lack of support. Financial stress.    3. Principal DSM5 Diagnoses  (Sustained by DSM5 Criteria Listed Above):   296.33 (F33.2) Major Depressive Disorder, Recurrent Episode, Severe With anxious distress  300.02 (F41.1) Generalized Anxiety Disorder.  4. Other Diagnoses that is relevant to services:  Substance-Related & Addictive Disorders Alcohol Use Disorder   303.90 (F10.20) Severe  304.30 (F12.20) Cannabis Use Disorder Severe   5. No other symptoms were  reported during the assessment that would indicate alternate diagnoses.  Should symptoms arise during the course of treatment the diagnoses can be updated at that time.  6. Prognosis: Expect Improvement and Relieve Acute Symptoms.  7. Likely consequences of symptoms if not treated:Without treatment patient more than likely will experience a continuation of symptoms with decreased daily functioning, requiring an increased level of care.  8. Client strengths include:  motivated and work history .     Recommendations:     1. Plan for Safety and Risk Management:   A safety and risk management plan has been developed including: Patient consented to co-developed safety plan.  Safety and risk management plan was completed.  Patient agreed to use safety plan should any safety concerns arise.  A copy was given to the patient..          Report to child / adult protection services was NA.     2. Patient's identified devendra / Temple / spiritual influences will be incorporated into care by listening to needs and connecting with spiritual service as needed.     3. Initial Treatment will focus on: Depressed Mood - increase coping skills to reduce anxiety and depression. Anxiety - increase coping skills to reduce anxiety and depression.    Alcohol / Substance Use - recovery skills.     4. Resources/Service Plan:    services are not indicated.   Modifications to assist communication are not indicated.   Additional disability accommodations are not indicated.      5. Collaboration:   Collaboration / coordination of treatment will be initiated with the following support professionals: A verbal Release of Information has been obtained for: emergency contact, mother, Elle Galloway, [phone].     6.  Referrals:   The following referral(s) will be initiated: DDP. Next Scheduled Appointment: TBD on wait list.    7. JAXON:    Recommendations:  Abstain on using all non-prescribed mood altering chemicals and substances .  Patient  "reports they are willing to follow these recommendations.  Patient would like the following family or other support people involved in their treatment:  Mother as emergency contact. Patient does not have a history of opiate use.    8. Records:   These were reviewed at time of assessment.   Information in this assessment was obtained from the medical record and  provided by patient who is a good historian.    Patient will have open access to their mental health medical record.        Provider Name/ Credentials:  MARYAM Nevarez, DOYLE  Angely 10, 2021        LOCUS Worksheet     Name: Robert Steele MRN: [id number]    : 2000      Gender:  female    PMI:  [id number]    Provider Name: [facility]   Provider NPI:  [id number]    Actual level of Care Provided:  psychiatry    Service(s) receiving or referred to:  DDP    Reason for Variance: Needs more support and skills to address anxiety, depression, and JAXON's.      Rating completed by: MARYAM Nevarez, DOYLE      I. Risk of Harm:   3      Moderate Risk of Harm    II. Functional Status:   3      Moderate Impairment    III. Co-Morbidity:   3      Significant Co-Morbidity    IV - A. Recovery Environment - Level of Stress:   2      Mildly Stressful Environment    IV - B. Recovery Environment - Level of Support:   3      Limited Support in Environment    V. Treatment and Recovery History:   3      Moderate to Equivocal Response to Treatment and Recovery Management    VI. Engagement and Recovery Project:   2      Positive Engagement and Recovery       19 Composite Score    Level of Care Recommendation:   17 to 19       High Intensity Community Based Services                  Outpatient Mental Health Services - Adult    MY COPING PLAN FOR SAFETY    PATIENT'S NAME: Robert Steele  MRN:   [id number]    SAFETY PLAN:    Step 1: Warning signs / cues (Thoughts, images, mood, situation, behavior) that a crisis may be developing:      Thoughts: \"I'm a " "burden\", \"I can't do this anymore\", \"I just want this to end\" and \"Nothing makes it better\"    Images: visions of harm: overdose    Thinking Processes:ruminations (can't stop thinking about my problems/stressors), intrusive thoughts (bothersome, unwanted thoughts that come out of nowhere) and highly critical and negative thoughts: of self    Mood: worsening depression, hopelessness and intense worry    Behaviors: using drugs, using alcohol, not taking care of myself and not taking care of my responsibilities    Situations: relationship problems, relapse and financial stress       Step 2: Coping strategies - Things I can do to take my mind off of my problems without contacting another person (relaxation technique, physical activity):      Distress Tolerance Strategies:  read and coloring rm cards and like to use those when I get anxious. Listen to music helps. Influence my moods, trying to listen to calm music.    Physical Activities: go for a walk nice trails in [place]    Focus on helpful thoughts:  affirmations and prayers and it was really helping. Mirror work was helping.    Step 3: People and social settings that provide distraction:     Name: mother Colbert Phone: [phone]   Name:  Phone:    [employer]     Step 4: Remind myself of people and things that are important to me and worth living for:  Peace and want to be peaceful and me breathing and having purpose here. Mindful of my actions and purposeful. Not want to depend on things.     Step 5: When I am in crisis, I can ask these people to help me use my safety plan:     Name: Elle Galloway, mother Phone: [phone]   Name:  Phone    Step 6: Making the environment safe:       remove alcohol, remove drugs and be around others    Step 7: Professionals or agencies I can contact during a crisis:      Suicide Prevention Lifeline: 3-380-023-TALK (2176)    Crisis Text Line Service (available 24 hours a day, 7 days a week): Text [place] to 077087    Call  **CRISIS " (902318) from a cell phone to talk to a team of professionals who can help you.    Crisis Services By County: Phone Number:   [place]     [phone]   [place]    [phone]   [place]    [phone]   [place]    [phone]   [place]    [phone]   [place] [phone]   [place]     [phone]       Call 911 or go to my nearest emergency department.     I helped develop this safety plan and agree to use it when needed.  I have been given a copy of this plan.        Today s date:  6/10/2021  Adapted from Safety Plan Template 2008 Mandie Teixeira and Amilcar Miller is reprinted with the express permission of the authors.  No portion of the Safety Plan Template may be reproduced without the express, written permission.  You can contact the authors at [email or web address] or [email or web address]

## 2021-06-11 ENCOUNTER — BEH TREATMENT PLAN (OUTPATIENT)
Dept: BEHAVIORAL HEALTH | Facility: CLINIC | Age: 21
End: 2021-06-11
Attending: PSYCHIATRY & NEUROLOGY

## 2021-06-11 ENCOUNTER — TELEPHONE (OUTPATIENT)
Dept: BEHAVIORAL HEALTH | Facility: CLINIC | Age: 21
End: 2021-06-11

## 2021-06-11 DIAGNOSIS — F33.2 MAJOR DEPRESSIVE DISORDER, RECURRENT EPISODE, SEVERE WITH ANXIOUS DISTRESS (H): ICD-10-CM

## 2021-06-11 ASSESSMENT — ANXIETY QUESTIONNAIRES: GAD7 TOTAL SCORE: 12

## 2021-06-11 NOTE — PROGRESS NOTES
Admission Date: 6/11/2021    Identify any current concerns with potential impact to admission:     medication/medical concerns: no medical concerns      immediate safety concerns: no suicidal ideation and no other safety concerns    Does patient have safety plan? yes  Note: Please copy safety plan copied into BEH Encounter     Other (insurance/childcare/transportation/housing/planned absences/etc): none reported     Patient's insurance is: [employer] .     Does patient need appointment with provider? no    Review patient's program schedule and inform them of any variation due to late days or holidays.                                                                                  (delete if not applicable):   For Dual Disorder Outpatient Program:     Patient reported her last use of substances as: yesterday, marijuana.     Patient reports the following concerns in regards to withdrawal/intoxication: none reported         Completed by: Geno Jackman MA, LPCC, LADC

## 2021-06-11 NOTE — PROGRESS NOTES
"Outpatient Mental Health Services - Adult     MY COPING PLAN FOR SAFETY     PATIENT'S NAME:    Robert Steele  MRN:                           [id number]     SAFETY PLAN:     Step 1: Warning signs / cues (Thoughts, images, mood, situation, behavior) that a crisis may be developing:     ? Thoughts: \"I'm a burden\", \"I can't do this anymore\", \"I just want this to end\" and \"Nothing makes it better\"  ? Images: visions of harm: overdose  ? Thinking Processes:ruminations (can't stop thinking about my problems/stressors), intrusive thoughts (bothersome, unwanted thoughts that come out of nowhere) and highly critical and negative thoughts: of self  ? Mood: worsening depression, hopelessness and intense worry  ? Behaviors: using drugs, using alcohol, not taking care of myself and not taking care of my responsibilities  ? Situations: relationship problems, relapse and financial stress   ?    Step 2: Coping strategies - Things I can do to take my mind off of my problems without contacting another person (relaxation technique, physical activity):     ? Distress Tolerance Strategies:  read and coloring rm cards and like to use those when I get anxious. Listen to music helps. Influence my moods, trying to listen to calm music.  ? Physical Activities: go for a walk nice [employer] in [place]  ? Focus on helpful thoughts:  affirmations and prayers and it was really helping. Mirror work was helping.     Step 3: People and social settings that provide distraction:                 Name: Elle Galloway, mother Phone: [phone]              Name:  Phone:               [employer]          Step 4: Remind myself of people and things that are important to me and worth living for:  Peace and want to be peaceful and me breathing and having purpose here. Mindful of my actions and purposeful. Not want to depend on things.      Step 5: When I am in crisis, I can ask these people to help me use my safety plan:                 Name: Elle Ndcaren, mother " Phone: [phone]              Name:  Phone     Step 6: Making the environment safe:      ? remove alcohol, remove drugs and be around others     Step 7: Professionals or agencies I can contact during a crisis:     ? Suicide Prevention Lifeline: 1-846-235-SQYN (8140)  ? Crisis Text Line Service (available 24 hours a day, 7 days a week): Text [place] to 471907  ? Call  **CRISIS (021167) from a cell phone to talk to a team of professionals who can help you.          Crisis Services By [facility]: Phone Number:   [place]     [phone]   [place]    [phone]   [place]    [phone]   [place]    [phone]   [place]    [phone]   [place] [phone]   [place]     [phone]      ? Call 580 or go to my nearest emergency department.             I helped develop this safety plan and agree to use it when needed.  I have been given a copy of this plan.          Today s date:  6/10/2021  Adapted from Safety Plan Template 2008 Mandie Teixeira and Amilcar Miller is reprinted with the express permission of the authors.  No portion of the Safety Plan Template may be reproduced without the express, written permission.  You can contact the authors at [email or web address] or [email or web address]                           Telephone Visit on 6/10/2021        Detailed Report        Note shared with patient

## 2021-06-11 NOTE — TELEPHONE ENCOUNTER
Writer contacted patient and completed admission to DDP 1 program for 6/14/21 start.     Geno Jackman, [facility]  6/11/2021

## 2021-06-14 ENCOUNTER — HOSPITAL ENCOUNTER (OUTPATIENT)
Dept: BEHAVIORAL HEALTH | Facility: CLINIC | Age: 21
End: 2021-06-14
Attending: PSYCHIATRY & NEUROLOGY
Payer: COMMERCIAL

## 2021-06-14 PROBLEM — F33.2 MAJOR DEPRESSIVE DISORDER, RECURRENT EPISODE, SEVERE WITH ANXIOUS DISTRESS (H): Status: ACTIVE | Noted: 2021-06-14

## 2021-06-14 PROCEDURE — 90853 GROUP PSYCHOTHERAPY: CPT | Mod: 95 | Performed by: COUNSELOR

## 2021-06-14 PROCEDURE — G0177 OPPS/PHP; TRAIN & EDUC SERV: HCPCS | Mod: GT

## 2021-06-14 PROCEDURE — G0177 OPPS/PHP; TRAIN & EDUC SERV: HCPCS | Mod: GT | Performed by: OCCUPATIONAL THERAPIST

## 2021-06-14 NOTE — GROUP NOTE
Psychoeducation Group Note    PATIENT'S NAME: Robert Steele  MRN:   [id number]  :   2000  ACCT. NUMBER: [id number]  DATE OF SERVICE: 21  START TIME: 11:00 AM  END TIME: 11:50 AM  FACILITATOR: Clementina Chu RN  TOPIC: HE RN Group: [facility]  Telemedicine Visit: The patient's condition can be safely assessed and treated via synchronous audio and visual telemedicine encounter.      Reason for Telemedicine Visit:  covid19    Originating Site (Patient Location): Patient's home    Distant Site (Provider Location): Provider Remote Setting    Consent:  The patient/guardian has verbally consented to: the potential risks and benefits of telemedicine (video visit) versus in person care; bill my insurance or make self-payment for services provided; and responsibility for payment of non-covered services.     Mode of Communication:  Video Conference via zoom    As the provider I attest to compliance with applicable laws and regulations related to telemedicine.      [facility] Adult Dual Diagnosis Day Treatment  TRACK: 1    NUMBER OF PARTICIPANTS: 6    Summary of Group / Topics Discussed:  Foundations of Health: Sleep: Case study/sleep hygiene: Patients explored the connection between sleep and mental illness. Patients learned about how adequate sleep can improve health, productivity, wellness, quality of life, and safety.     Patient Session Goals / Objectives:  ? Demonstrated understanding of sleep hygiene practices and benefits of sleep  ? Identified sleep hygiene strategies to utilize     Described the connection between sleep disturbances and mental illness        Patient Participation / Response:  Fully participated with the group by sharing personal reflections / insights and openly received / provided feedback with other participants.    Demonstrated understanding of topics discussed through group discussion and participation and Identified / Expressed personal readiness to practice  skills    Treatment Plan:  Patient has an initial individualized treatment plan that was created as part of their diagnostic assessment / admission process.  A master individualized treatment plan is in the process of being developed with the patient and multi-disciplinary care team.    Clementina Chu RN

## 2021-06-14 NOTE — GROUP NOTE
"Psychoeducation Group Note    PATIENT'S NAME: Robert Steele  MRN:   [id number]  :   2000  ACCT. NUMBER: [id number]  DATE OF SERVICE: 21  START TIME: 10:00 AM  END TIME: 10:50 AM  FACILITATOR: Maile Andrews OTR/L  TOPIC: MH Life Skills Group: Resiliency Development  [facility] Adult Dual Diagnosis Day Treatment  TRACK: 1    NUMBER OF PARTICIPANTS: 6    Telemedicine Visit: The patient's condition can be safely assessed and treated via synchronous audio and visual telemedicine encounter.      Reason for Telemedicine Visit: Services only offered telehealth    Originating Site (Patient Location): Patient's home    Distant Site (Provider Location): [facility]    Consent:  The patient/guardian has verbally consented to: the potential risks and benefits of telemedicine (video visit) versus in person care; bill my insurance or make self-payment for services provided; and responsibility for payment of non-covered services.     Mode of Communication:  Video Conference via Zoom    As the provider I attest to compliance with applicable laws and regulations related to telemedicine.     Summary of Group / Topics Discussed:  Resiliency Development:  Self Esteem and Self Compassion: Occupational Gifts: Patients were given the opportunity to reflect and identified different types of occupations (activities) they participate in to maintain and/or build resilience in their lives.  Patients were taught about how \"doing\" promotes mental health recovery by providing, routine and structure, empowerment, sense of self, and connectedness.  Patients identified occupations (activities) that promote mental health: connection, centering, creation, contemplation, and contribution.  Patients were also given the opportunity to improve self efficacy, self sufficiency, quality of life and a sense of mastery and competency by identifying positive aspects of their life " and to instill hope.       Patient Session Goals / Objectives:    Identified occupations (activities) they can use to promote mental health recovery and as a way to effectively manage both mental health and substance abuse/abuse symptoms     Improved awareness of positive qualities of occupations they currently participate in and new occupations they might try and how this contribute to the process of recovery and mental health wellbeing and resiliency      Established a plan for practice of these skills in their own environments    Practiced and reflected on how to generalize taught skills to their everyday life       Patient Participation / Response:  Fully participated with the group by sharing personal reflections / insights and openly received / provided feedback with other participants.    Patient presentation: active in group discussion sharing ideas/examples with the group; seemed open to ideas, Verbalized understanding of content and Patient would benefit from additional opportunities to practice the content to be able to generalize it to their everyday life with increased intentionality, consistency, and efficacy in support of their psychiatric recovery    Ward began the Dual Disorders Program today.  She was welcomed to group and oriented to Life skills groups.  Encouraged her to ask questions as needed.     Treatment Plan:  Patient has an initial individualized treatment plan that was created as part of their diagnostic assessment / admission process.  A master individualized treatment plan is in the process of being developed with the patient and multi-disciplinary care team.    Maile Andrews, MIGUEL ANGELR/L

## 2021-06-14 NOTE — GROUP NOTE
Process Group Note    PATIENT'S NAME: Robert Steele  MRN:   [id number]  :   2000  ACCT. NUMBER: [id number]  DATE OF SERVICE: 21  START TIME:  9:00 AM  END TIME:  9:50 AM  FACILITATOR: Nichelle Castaneda [facility]  TOPIC:  Process Group    Diagnoses:  296.33 (F33.2) Major Depressive Disorder, Recurrent Episode, Severe With anxious distress  300.02 (F41.1) Generalized Anxiety Disorder.  4. Other Diagnoses that is relevant to services:  Substance-Related & Addictive Disorders Alcohol Use Disorder   303.90 (F10.20) Severe  304.30 (F12.20) Cannabis Use Disorder Severe   296.33 (F33.2) Major Depressive Disorder, Recurrent Episode, Severe With anxious distress  300.02 (F41.1) Generalized Anxiety Disorder.  4. Other Diagnoses that is relevant to services:  Substance-Related & Addictive Disorders Alcohol Use Disorder   303.90 (F10.20) Severe  304.30 (F12.20) Cannabis Use Disorder Severe       [facility] Adult Dual Diagnosis Day Treatment  TRACK: 1    NUMBER OF PARTICIPANTS: 5    Telemedicine Visit: The patient's condition can be safely assessed and treated via synchronous audio and visual telemedicine encounter.      Reason for Telemedicine Visit: Services only offered telehealth    Originating Site (Patient Location): Patient's home    Distant Site (Provider Location): Provider Remote Setting    Consent:  The patient/guardian has verbally consented to: the potential risks and benefits of telemedicine (video visit) versus in person care; bill my insurance or make self-payment for services provided; and responsibility for payment of non-covered services.     Mode of Communication:  Video Conference via [employer]    As the provider I attest to compliance with applicable laws and regulations related to telemedicine.            Data:    Session content: At the start of this group, patients were invited to check in by identifying themselves, describing their current emotional status, and identifying issues to  address in this group.   Area(s) of treatment focus addressed in this session included Symptom Management, Personal Safety and Abstinence/Relapse Prevention.    Ward reported feeling good this morning but since group started, has been feeling very emotional and tearful.  Her goal for the day is to get outside and ground herself and also listen to instrumental music.  Client declined additional process time but contributed to group discussion and provided feedback and support to peers.      Therapeutic Interventions/Treatment Strategies:  Psychotherapist offered support, feedback and validation and reinforced use of skills. Treatment modalities used include Motivational Interviewing, Cognitive Behavioral Therapy and Dialectical Behavioral Therapy. Interventions include Symptoms Management: Promoted understanding of their diagnoses and how it impacts their functioning.    Assessment:    Patient response:   Patient responded to session by accepting feedback, giving feedback and listening    Possible barriers to participation / learning include: and no barriers identified    Health Issues:   None reported       Substance Use Review:   Substance Use: Substance use has decreased    Mental Status/Behavioral Observations  Appearance:   Appropriate   Eye Contact:   Good   Psychomotor Behavior: Normal   Attitude:   Cooperative   Orientation:   All  Speech   Rate / Production: Normal    Volume:  Normal   Mood:    Depressed   Affect:    Tearful  Thought Content:   Clear  Thought Form:  Coherent  Logical     Insight:    Good     Plan:     Safety Plan: No current safety concerns identified.  Recommended that patient call 911 or go to the local ED should there be a change in any of these risk factors.     Barriers to treatment: None identified    Patient Contracts (see media tab):  None    Substance Use: Provided encouragement towards sobriety    Provided support and affirmation for steps taken towards sobriety      Continue or  Discharge: Patient will continue in Adult Day Treatment (ADT)  as planned. Patient is likely to benefit from learning and using skills as they work toward the goals identified in their treatment plan.      Nichelle Castaneda, [facility]  June 14, 2021

## 2021-06-15 ENCOUNTER — TELEPHONE (OUTPATIENT)
Dept: BEHAVIORAL HEALTH | Facility: CLINIC | Age: 21
End: 2021-06-15

## 2021-06-15 NOTE — TELEPHONE ENCOUNTER
Program received voicemail from patient stating she needs to withdraw from program due to work schedule. Writer returned phone call to patient to confirm her wish to withdraw. Patient presented as irritable and reported that she has no choice but to return to work. She endorsed absence of any safety concerns. She shared that she plans to meet with a psychiatrist through [facility] and work with her primary care physician. Patient is discharged.     Geno Jackman, [facility], LADC  6/15/2021

## 2021-06-16 ENCOUNTER — HOSPITAL ENCOUNTER (OUTPATIENT)
Dept: BEHAVIORAL HEALTH | Facility: CLINIC | Age: 21
End: 2021-06-16
Attending: PSYCHIATRY & NEUROLOGY
Payer: COMMERCIAL

## 2021-06-16 PROCEDURE — G0177 OPPS/PHP; TRAIN & EDUC SERV: HCPCS | Mod: GT

## 2021-06-16 PROCEDURE — 90853 GROUP PSYCHOTHERAPY: CPT | Mod: GT | Performed by: PSYCHOLOGIST

## 2021-06-16 PROCEDURE — 90853 GROUP PSYCHOTHERAPY: CPT | Mod: 95 | Performed by: MARRIAGE & FAMILY THERAPIST

## 2021-06-16 NOTE — GROUP NOTE
Process Group Note    PATIENT'S NAME: Robert Steele  MRN:   [id number]  :   2000  ACCT. NUMBER: [id number]  DATE OF SERVICE: 21  START TIME:  9:00 AM  END TIME:  9:50 AM  FACILITATOR: Heidi Valero LMFT  TOPIC:  Process Group    Diagnoses:  296.33 (F33.2) Major Depressive Disorder, Recurrent Episode, Severe With anxious distress  300.02 (F41.1) Generalized Anxiety Disorder.  4. Other Diagnoses that is relevant to services:  Substance-Related & Addictive Disorders Alcohol Use Disorder   303.90 (F10.20) Severe  304.30 (F12.20) Cannabis Use Disorder Severe   296.33 (F33.2) Major Depressive Disorder, Recurrent Episode, Severe With anxious distress  300.02 (F41.1) Generalized Anxiety Disorder.  4. Other Diagnoses that is relevant to services:  Substance-Related & Addictive Disorders Alcohol Use Disorder   303.90 (F10.20) Severe  304.30 (F12.20) Cannabis Use Disorder Severe     [facility] Adult Dual Diagnosis Day Treatment  TRACK: 2    NUMBER OF PARTICIPANTS: 5    Telemedicine Visit: The patient's condition can be safely assessed and treated via synchronous audio and visual telemedicine encounter.      Reason for Telemedicine Visit: Services only offered telehealth    Originating Site (Patient Location): Patient's home    Distant Site (Provider Location): Provider Remote Setting- Home Office    Consent:  The patient/guardian has verbally consented to: the potential risks and benefits of telemedicine (video visit) versus in person care; bill my insurance or make self-payment for services provided; and responsibility for payment of non-covered services.     Mode of Communication:  Video Conference via Grupanya    As the provider I attest to compliance with applicable laws and regulations related to telemedicine.           Data:    Session content: At the start of this group, patients were invited to check in by identifying themselves, describing their current emotional status, and  identifying issues to address in this group.   Area(s) of treatment focus addressed in this session included Symptom Management, Personal Safety and Abstinence/Relapse Prevention.    annie is feeling better than yesterday, is feeling more at peace, her goal is to complete some self care, get her hair done nails done, is breaking things into smaller pieces, work on her routine, denies barriers, she called [facility] yesterday and they came out to talk to her, denies safety concerns, denies chemical use, is taking rx, Is proud of working thought her emotions and not sweeping them under the rug, is grateful fro the group, is happy to be back today, Client declined additional process time but contributed to group discussion and provided feedback and support to peers.      Therapeutic Interventions/Treatment Strategies:  Psychotherapist offered support, feedback and validation and reinforced use of skills. Treatment modalities used include Motivational Interviewing, Cognitive Behavioral Therapy and Dialectical Behavioral Therapy.    Assessment:    Patient response:   Patient responded to session by listening, being attentive and appearing alert    Possible barriers to participation / learning include: and no barriers identified    Health Issues:   None reported       Substance Use Review:   Substance Use: alcohol  and cannabis .     Mental Status/Behavioral Observations  Appearance:   Appropriate   Eye Contact:   Good   Psychomotor Behavior: Normal   Attitude:   Cooperative   Orientation:   All  Speech   Rate / Production: Normal    Volume:  Normal   Mood:    Anxious   Affect:    Appropriate   Thought Content:   Clear and Safety denies any current safety concerns including suicidal ideation, self-harm, and homicidal ideation  Thought Form:  Coherent  Logical     Insight:    Good     Plan:     Safety Plan: No current safety concerns identified.  Recommended that patient call 911 or go to the local ED should  there be a change in any of these risk factors.     Barriers to treatment: None identified    Patient Contracts (see media tab):  None    Substance Use: Provided encouragement towards sobriety    Provided support and affirmation for steps taken towards sobriety      Continue or Discharge: Patient will continue in Adult Dual Disorder Program (DDP) as planned. Patient is likely to benefit from learning and using skills as they work toward the goals identified in their treatment plan.      Gasper Valero, LMFT  June 16, 2021

## 2021-06-16 NOTE — GROUP NOTE
Psychoeducation Group Note    PATIENT'S NAME: Robert Steele  MRN:   [id number]  :   2000  ACCT. NUMBER: [id number]  DATE OF SERVICE: 21  START TIME: 11:00 AM  END TIME: 11:50 AM  FACILITATOR: Clementina Chu RN  TOPIC: HE RN Group: Mind/Body Practice & Complementary  Telemedicine Visit: The patient's condition can be safely assessed and treated via synchronous audio and visual telemedicine encounter.      Reason for Telemedicine Visit:  covid19    Originating Site (Patient Location): Patient's home    Distant Site (Provider Location): Provider Remote Setting- Home Office    Consent:  The patient/guardian has verbally consented to: the potential risks and benefits of telemedicine (video visit) versus in person care; bill my insurance or make self-payment for services provided; and responsibility for payment of non-covered services.     Mode of Communication:  Video Conference via zoom    As the provider I attest to compliance with applicable laws and regulations related to telemedicine.      [facility] Adult Dual Diagnosis Day Treatment  TRACK: 1    NUMBER OF PARTICIPANTS: 4    Summary of Group / Topics Discussed:  Mind/Body Practice & Complementary Therapies:  Progressive Muscle Relaxation: In addition to affecting our mood and behavior, psychological stress can cause a myriad of physical symptoms in our body. Patients were educated on these effects and guided to increased self-awareness of how stress affects their body. The purpose, benefits, history, and techniques of progressive muscle relaxation were discussed. In an instructor guided experiential, patients were guided to practice PMR to help reduce physical symptoms of psychological stress and achieve a more balanced feeling of well-being.    Patient Session Goals / Objectives:  ? Identified physiological symptoms of stress on the body  ? Listed & Explained the purpose and benefits to practicing PMR   ? Practiced progressive muscle  relaxation experiential        Patient Participation / Response:  Fully participated with the group by sharing personal reflections / insights and openly received / provided feedback with other participants.    Demonstrated understanding of topics discussed through group discussion and participation and Identified / Expressed personal readiness to practice skills    Treatment Plan:  Patient has an initial individualized treatment plan that was created as part of their diagnostic assessment / admission process.  A master individualized treatment plan is in the process of being developed with the patient and multi-disciplinary care team.    Clementina Chu RN

## 2021-06-16 NOTE — GROUP NOTE
Psychotherapy Group Note    PATIENT'S NAME: Robert Steele  MRN:   [id number]  :   2000  ACCT. NUMBER: [id number]  DATE OF SERVICE: 21  START TIME: 10:00 AM  END TIME: 10:50 AM  FACILITATOR: Aaron Millard LP  TOPIC:  EBP Group: Behavioral Activation  [facility] Adult Dual Diagnosis Day Treatment  TRACK: 2    NUMBER OF PARTICIPANTS: 4    Summary of Group / Topics Discussed:  Behavioral Activation: [place]: {Patients identified situations that prompt unwanted and unhelpful emotions / thoughts / behaviors.   Patients discussed how to problem solve by proactively using coping skills in potentially difficult situations. Components included describing the situation, brainstorming coping skills, imagining how scenario can/will unfold, rehearsing the action plan, and practicing relaxation to follow.  Patients practiced using these skills to reduce symptom distress and increase effective coping  behaviors.      Patient Session Goals / Objectives:    Identify difficult situation(s), and gain proficiency with alternative behaviors / skills to problem solve.    Increase confidence using coping skills through group practice in session.    Receive and provide feedback regarding skill development.    Apply coping skills in daily life situations.      Patient Participation / Response:  Fully participated with the group by sharing personal reflections / insights and openly received / provided feedback with other participants.    Demonstrated understanding of topics discussed through group discussion and participation    Treatment Plan:  Patient has a current master individualized treatment plan.  See Epic treatment plan for more information.    Aaron Millard LP

## 2021-06-17 ENCOUNTER — TELEPHONE (OUTPATIENT)
Dept: BEHAVIORAL HEALTH | Facility: CLINIC | Age: 21
End: 2021-06-17

## 2021-06-17 ENCOUNTER — HOSPITAL ENCOUNTER (OUTPATIENT)
Dept: BEHAVIORAL HEALTH | Facility: CLINIC | Age: 21
End: 2021-06-17
Attending: PSYCHIATRY & NEUROLOGY
Payer: COMMERCIAL

## 2021-06-17 PROCEDURE — 90853 GROUP PSYCHOTHERAPY: CPT | Mod: GT | Performed by: MARRIAGE & FAMILY THERAPIST

## 2021-06-17 PROCEDURE — 90853 GROUP PSYCHOTHERAPY: CPT | Mod: GT | Performed by: PSYCHOLOGIST

## 2021-06-17 NOTE — GROUP NOTE
Process Group Note    PATIENT'S NAME: Robert Steele  MRN:   [id number]  :   2000  ACCT. NUMBER: [id number]  DATE OF SERVICE: 21  START TIME:  9:00 AM  END TIME:  9:50 AM  FACILITATOR: Aaron Millard LP  TOPIC:  Process Group    Diagnoses:  296.33 (F33.2) Major Depressive Disorder, Recurrent Episode, Severe With anxious distress  300.02 (F41.1) Generalized Anxiety Disorder.  4. Other Diagnoses that is relevant to services:  Substance-Related & Addictive Disorders Alcohol Use Disorder   303.90 (F10.20) Severe  304.30 (F12.20) Cannabis Use Disorder Severe   296.33 (F33.2) Major Depressive Disorder, Recurrent Episode, Severe With anxious distress  300.02 (F41.1) Generalized Anxiety Disorder.  4. Other Diagnoses that is relevant to services:  Substance-Related & Addictive Disorders Alcohol Use Disorder   303.90 (F10.20) Severe  304.30 (F12.20) Cannabis Use Disorder Severe     [facility] Adult Dual Diagnosis Day Treatment  TRACK: 1    NUMBER OF PARTICIPANTS: 4    Telemedicine Visit: The patient's condition can be safely assessed and treated via synchronous audio and visual telemedicine encounter.      Reason for Telemedicine Visit: Services only offered telehealth    Originating Site (Patient Location): Patient's home    Distant Site (Provider Location): Provider Remote Setting- Home Office    Consent:  The patient/guardian has verbally consented to: the potential risks and benefits of telemedicine (video visit) versus in person care; bill my insurance or make self-payment for services provided; and responsibility for payment of non-covered services.     Mode of Communication:  Video Conference via Gust    As the provider I attest to compliance with applicable laws and regulations related to telemedicine.            Data:    Session content: At the start of this group, patients were invited to check in by identifying themselves, describing their current emotional status, and  "identifying issues to address in this group.   Area(s) of treatment focus addressed in this session included Symptom Management, Personal Safety, Community Resources/Discharge Planning, Abstinence/Relapse Prevention, Develop / Improve Independent Living Skills and Develop Socialization / Interpersonal Relationship Skills.    Pt reports feeling \"lethargic..\" Pt reports her family does not understand her situation.     Therapeutic Interventions/Treatment Strategies:  Psychotherapist offered support, feedback and validation. Treatment modalities used include Cognitive Behavioral Therapy. Interventions include Relationship Skills: Discussed strategies to promote healthier understanding of interpersonal relationships.    Assessment:    Patient response:   Patient responded to session by accepting feedback, giving feedback, listening, focusing on goals, being attentive, accepting support and appearing alert    Possible barriers to participation / learning include: and no barriers identified    Health Issues:   None reported       Substance Use Review:   Substance Use: Substance use has decreased    Mental Status/Behavioral Observations  Appearance:   Appropriate   Eye Contact:   Good   Psychomotor Behavior: Normal   Attitude:   Cooperative   Orientation:   All  Speech   Rate / Production: Normal    Volume:  Normal   Mood:    Normal  Affect:    Appropriate   Thought Content:   Clear  Thought Form:  Coherent  Logical     Insight:    Good     Plan:     Safety Plan: Recommended that patient call 911 or go to the local ED should there be a change in any of these risk factors.     Barriers to treatment: None identified    Patient Contracts (see media tab):  None    Substance Use: Stage of Change: Action     Continue or Discharge: Patient will continue in Adult Dual Disorder Program (DDP) as planned. Patient is likely to benefit from learning and using skills as they work toward the goals identified in their treatment " plan.      Aaron Millard LP  June 17, 2021

## 2021-06-17 NOTE — GROUP NOTE
Psychotherapy Group Note    PATIENT'S NAME: Robert Steele  MRN:   [id number]  :   2000  ACCT. NUMBER: [id number]  DATE OF SERVICE: 21  START TIME: 10:00 AM  END TIME: 10:50 AM  FACILITATOR: Heidi Valero LMFT  TOPIC:  EBP Group: Behavioral Activation  [facility] Adult Dual Diagnosis Day Treatment  TRACK: 1    NUMBER OF PARTICIPANTS: 5    Telemedicine Visit: The patient's condition can be safely assessed and treated via synchronous audio and visual telemedicine encounter.      Reason for Telemedicine Visit: Services only offered telehealth    Originating Site (Patient Location): Patient's home    Distant Site (Provider Location): Provider Remote Setting- Home Office    Consent:  The patient/guardian has verbally consented to: the potential risks and benefits of telemedicine (video visit) versus in person care; bill my insurance or make self-payment for services provided; and responsibility for payment of non-covered services.     Mode of Communication:  Video Conference via Schvey    As the provider I attest to compliance with applicable laws and regulations related to telemedicine.     Summary of Group / Topics Discussed:  Behavioral Activation: Activity Scheduling:Patients explored how they currently spend their time, and how specific behaviors impact thoughts and feelings.  The group explored the effect of negative and positive activities on mood states and thought patterns.  Patients identified activities that help to improve mood and thinking patterns, and developed a plan to implement positive activities between sessions.      Patient Session Goals / Objectives:    Identify impact of current behaviors on thoughts and mood    Identify 2-3 behavioral changes that could have a positive impact on thoughts and mood    Prepare to make desired behavioral change: Create a change plan / activity schedule      Patient Participation / Response:  Moderately participated, sharing some  personal reflections / insights and adequately adequately received / provided feedback with other participants.    Demonstrated understanding of topics discussed through group discussion and participation, Expressed understanding of the relationship between behaviors, thoughts, and feelings and Shared experiences and challenges with making behavioral changes    Treatment Plan:  Patient has a current master individualized treatment plan.  See Epic treatment plan for more information.    Gasper Valero, ELIANAFT

## 2021-06-17 NOTE — TELEPHONE ENCOUNTER
"Start work Mon from 3-10pm.     RN Review of Medical History / Physical Health Screen  [facility] - [facility] Adult Dual Diagnosis Day Treatment    PATIENT'S NAME: Robert Steele  MRN:   [id number]  :   2000  ACCT. NUMBER: [id number]  CURRENT AGE:  21 year old    DATE OF DIAGNOSTIC ASSESSMENT: 6/10/21  DATE OF ADMISSION: 21     Please see Diagnostic Assessment for additional Medical History.     General Health:   Have you had any exposure to any communicable disease in the past 2-3 weeks? no     Are you aware of safe sex practices? yes       Nutrition:    Are you on a special diet? If yes, please explain:  no   Do you have any concerns regarding your nutritional status? If yes, please explain:  no   Have you had any appetite changes in the last 3 months?  Yes, fluctuating appetite     Have you had any weight loss or weight gain in the last 3 months?  Yes, how much? D/t birth control - losing bloating SE     Do you have a history of an eating disorder? no   Do you have a history of being in an eating disorder program? no     Patient height and weight recorded by RN in epic flowsheet: no No; Unable to measure  Because of temporary in-person programmatic suspension due to COVID-19 pandemic, all pt weights and heights will be collected through patient self-report an recorded in physical health screening progress note upon admission to the program.                            Height/Weight Review:  Patient reported height:     5'9\"   Patient reports weight:  Date last checked:  209 pounds today   Any referrals/needs identified?                BMI Review:  Was the patient informed of BMI? no      Findings See above         Fall Risk:   Have you had any falls in the past 3 months? no     Do you currently use any assistive devices for mobility?   no      Additional Comments/Assessment: Reports some dizzy spells, possibly r/t coming off meds and starting Zoloft;     Per " completion of the Medical History / Physical Health Screen, is there a recommendation to see / follow up with a primary care physician/clinic or dentist?    No.      Clementina Chu RN  6/17/2021

## 2021-06-18 ENCOUNTER — HOSPITAL ENCOUNTER (OUTPATIENT)
Dept: BEHAVIORAL HEALTH | Facility: CLINIC | Age: 21
End: 2021-06-18
Attending: PSYCHIATRY & NEUROLOGY
Payer: COMMERCIAL

## 2021-06-18 PROCEDURE — G0177 OPPS/PHP; TRAIN & EDUC SERV: HCPCS | Mod: GT

## 2021-06-18 PROCEDURE — 90853 GROUP PSYCHOTHERAPY: CPT | Mod: 95

## 2021-06-18 NOTE — GROUP NOTE
Psychoeducation Group Note    PATIENT'S NAME: Robert Steele  MRN:   [id number]  :   2000  ACCT. NUMBER: [id number]  DATE OF SERVICE: 21  START TIME:  9:00 AM  END TIME:  9:50 AM  FACILITATOR: Clementina Chu RN  TOPIC: HE RN Group: Health Maintenance  Telemedicine Visit: The patient's condition can be safely assessed and treated via synchronous audio and visual telemedicine encounter.      Reason for Telemedicine Visit:  covid19    Originating Site (Patient Location): Patient's home    Distant Site (Provider Location): Provider Remote Setting- Home Office    Consent:  The patient/guardian has verbally consented to: the potential risks and benefits of telemedicine (video visit) versus in person care; bill my insurance or make self-payment for services provided; and responsibility for payment of non-covered services.     Mode of Communication:  Video Conference via WazeTrip    As the provider I attest to compliance with applicable laws and regulations related to telemedicine.      [facility] Adult Dual Diagnosis Day Treatment  TRACK: 1    NUMBER OF PARTICIPANTS: 7    Summary of Group / Topics Discussed:  Health Maintenance: Weekend planning: Patients were given time to complete a weekend plan of what they will do to promote wellness and sobriety over the weekend when they do not have the structure of group. Patients were encouraged to review progress on their treatment goals and were challenged to identify ways to work toward meeting them. Patients identified and discussed foreseeable barriers to success over the weekend and then developed a plan to overcome them. Patients reviewed their distress coping skills and social support network and discussed this with the group.       Patient Session Goals / Objectives:    ?    Identified activities to engage in that promote balance in wellness  ?    Distinguished possible barriers to success over the weekend and created a plan to overcome them  ?     Listed distress coping skills and identified social support network to utilize if in crisis during the weekend          Patient Participation / Response:  Fully participated with the group by sharing personal reflections / insights and openly received / provided feedback with other participants.    Demonstrated understanding of topics discussed through group discussion and participation and Identified / Expressed personal readiness to practice skills    Treatment Plan:  Patient has an initial individualized treatment plan that was created as part of their diagnostic assessment / admission process.  A master individualized treatment plan is in the process of being developed with the patient and multi-disciplinary care team.    Clementina Chu RN

## 2021-06-18 NOTE — GROUP NOTE
Telemedicine Visit: The patient's condition can be safely assessed and treated via synchronous audio and visual telemedicine encounter.      Reason for Telemedicine Visit: Services only offered telehealth    Originating Site (Patient Location): Patient's home    Distant Site (Provider Location): Provider Remote Setting- Home Office    Consent:  The patient/guardian has verbally consented to: the potential risks and benefits of telemedicine (video visit) versus in person care; bill my insurance or make self-payment for services provided; and responsibility for payment of non-covered services.     Mode of Communication:  Video Conference via [employer]    As the provider I attest to compliance with applicable laws and regulations related to telemedicine.  Process Group Note    PATIENT'S NAME: Robert Steele  MRN:   [id number]  :   2000  ACCT. NUMBER: [id number]  DATE OF SERVICE: 21  START TIME: 10:00 AM  END TIME: 10:50 AM  FACILITATOR: Connie Raines LMFT  TOPIC:  Process Group    Diagnoses:  296.33 (F33.2) Major Depressive Disorder, Recurrent Episode, Severe With anxious distress  300.02 (F41.1) Generalized Anxiety Disorder.  4. Other Diagnoses that is relevant to services:  Substance-Related & Addictive Disorders Alcohol Use Disorder   303.90 (F10.20) Severe  304.30 (F12.20) Cannabis Use Disorder Severe   296.33 (F33.2) Major Depressive Disorder, Recurrent Episode, Severe With anxious distress  300.02 (F41.1) Generalized Anxiety Disorder.  4. Other Diagnoses that is relevant to services:  Substance-Related & Addictive Disorders Alcohol Use Disorder   303.90 (F10.20) Severe  304.30 (F12.20) Cannabis Use Disorder Severe        [facility] Adult Dual Diagnosis Day Treatment  TRACK: DDP1     NUMBER OF PARTICIPANTS: 7          Data:    Session content: At the start of this group, patients were invited to check in by identifying themselves, describing their current emotional status, and identifying  issues to address in this group.   Area(s) of treatment focus addressed in this session included Symptom Management.  Processed feeling tired and grateful for being able to show up for group. Goal is to work on her communication skills and working on how to ask for what she needs and communicate in a more clear way with folks around her. Reports feeling good about her job and support she has at work which has reduced her anxiety around her job. Reports taking medications and no safety concerns.  Therapeutic Interventions/Treatment Strategies:  Psychotherapist offered support, feedback and validation and reinforced use of skills. Treatment modalities used include Cognitive Behavioral Therapy. Interventions include Relationship Skills: Assisted patients in implementing more effective communication skills in their relationships.    Assessment:    Patient response:   Patient responded to session by accepting feedback, giving feedback and listening    Possible barriers to participation / learning include: and no barriers identified    Health Issues:   None reported       Substance Use Review:   Substance Use: No active concerns identified.    Mental Status/Behavioral Observations  Appearance:   Appropriate   Eye Contact:   Good   Psychomotor Behavior: Normal   Attitude:   Cooperative   Orientation:   All  Speech   Rate / Production: Normal    Volume:  Normal   Mood:    Normal  Affect:    Appropriate   Thought Content:   Clear  Thought Form:  Coherent  Logical     Insight:    Fair     Plan:     Safety Plan: No current safety concerns identified.  Recommended that patient call 911 or go to the local ED should there be a change in any of these risk factors.     Barriers to treatment: None identified    Patient Contracts (see media tab):  None    Substance Use: Not addressed in session     Continue or Discharge: Patient will continue in Adult Dual Disorder Program (DDP) as planned. Patient is likely to benefit from learning  and using skills as they work toward the goals identified in their treatment plan.      Connie Raines, FER  June 18, 2021

## 2021-06-18 NOTE — GROUP NOTE
Psychoeducation Group Note    PATIENT'S NAME: Robert Steele  MRN:   [id number]  :   2000  ACCT. NUMBER: [id number]  DATE OF SERVICE: 21  START TIME: 11:00 AM  END TIME: 11:50 AM  FACILITATOR: Jerome Dave OTR/L  TOPIC: MH Life Skills Group: Resiliency Development  Telemedicine Visit: The patient's condition can be safely assessed and treated via synchronous audio and visual telemedicine encounter.      Reason for Telemedicine Visit: COVID-19    Originating Site (Patient Location): Patient's home    Distant Site (Provider Location): Provider Remote Setting- Home Office    Consent:  The patient/guardian has verbally consented to: the potential risks and benefits of telemedicine (video visit) versus in person care; bill my insurance or make self-payment for services provided; and responsibility for payment of non-covered services.     Mode of Communication:  Video Conference via [employer]    As the provider I attest to compliance with applicable laws and regulations related to telemedicine.   [facility] Adult Dual Diagnosis Day Treatment  TRACK: Group 1    NUMBER OF PARTICIPANTS: 7    Summary of Group / Topics Discussed:  Resiliency Development:  Coping Skills: Personal Recovery Outcome Measure: Patients were taught how to identify coping strategies and routines that they can adopt and use for management with focus on a balance between physical, emotional, social and spiritual strategies.    Patient Session Goals / Objectives:    Identified personal definitions of recovery for effectively managing both mental health and substance abuse/abuse symptoms     Improved awareness of the process of recovery and skills and strategies that support this       Established a plan for practice of these skills in their own environments    Practiced and reflected on how to generalize taught skills to their everyday life        Patient Participation / Response:  Moderately participated, sharing some  personal reflections / insights and adequately adequately received / provided feedback with other participants.    Demonstrated understanding of content through handout/group discussion , Verbalized understanding of content and Patient would benefit from additional opportunities to practice the content to be able to generalize it to their everyday life with increased intentionality, consistency, and efficacy in support of their psychiatric recovery    Treatment Plan:  Patient has a current master individualized treatment plan.  See Epic treatment plan for more information.    Jerome Dave, OTR/L

## 2021-06-21 ENCOUNTER — HOSPITAL ENCOUNTER (OUTPATIENT)
Dept: BEHAVIORAL HEALTH | Facility: CLINIC | Age: 21
End: 2021-06-21
Attending: PSYCHIATRY & NEUROLOGY
Payer: COMMERCIAL

## 2021-06-21 PROCEDURE — 90853 GROUP PSYCHOTHERAPY: CPT | Mod: GT | Performed by: PSYCHOLOGIST

## 2021-06-21 PROCEDURE — G0177 OPPS/PHP; TRAIN & EDUC SERV: HCPCS | Mod: GT | Performed by: OCCUPATIONAL THERAPIST

## 2021-06-21 PROCEDURE — G0177 OPPS/PHP; TRAIN & EDUC SERV: HCPCS | Mod: GT

## 2021-06-21 NOTE — PROGRESS NOTES
"  Adult Dual Disorder Program:  Individualized Treatment Plan     Date of Plan: 2021    Name: Robert \"Song Steele MRN: [id number]    : 2000    Programs:  Adult Dual Disorder Program (DDP)    Clinical Track (if applicable):  1    DSM5 Diagnosis  296.33 (F33.2) Major Depressive Disorder, Recurrent Episode, Severe With anxious distress  300.02 (F41.1) Generalized Anxiety Disorder.  4. Other Diagnoses that is relevant to services:  Substance-Related & Addictive Disorders Alcohol Use Disorder   303.90 (F10.20) Severe  304.30 (F12.20) Cannabis Use Disorder Severe     Adult Dual Disorder Program Multidisciplinary Team Members: Jaqueline Mcgraw MD, Nichelle Castaneda, [facility], [facility]; Isaias Millard MA, , [facility]; Gasper Valero, LMFT, [facility]; Genaro Dave, OTR/L; Maile Andrews, OTR/L; Clementina Chu RN     Robert Steele will participate in the Adult Dual Disorder Program  5 days per week, 3 hours per day. Anticipated duration/discharge: 6-8 weeks     Due to COVID-19, services will be delivered via telemedicine until further notice.        Program Start Date: 2021  Anticipated Discharge Date: 2021 (pending authorization/clinical changes)    NOTE: Complete CGI     Review Date: Does Robert Steele continue to meet criteria to participate in the Adult Dual Disorder Program, 5 days per week; 3 hours per day?   2021 yes   21 no                   Client Strengths:  insightful, motivated, open to learning, wants to learn, willing to ask questions and work history    Client Participation in Plan:  Contributed to goals and plan   Attended individual treatment plan meeting on 2021  Agrees with plan   Received copy of treatment plan   Discussed with staff     Areas of Vulnerability:  Suicidal Ideation   Anxiety  Depressive symptoms   Trauma/Abuse/Neglect  Substance use   Had IEP in school due to attention, concentration, writing, and reading; able to complete some college    Long-Term Goals:  Knowledge " about illness and management of symptoms   Maintenance of personal safety   Maintenance of sobriety     Abuse Prevention Plan:  Safe, therapeutic environment   Safety coping plan as needed   Education regarding illness and skill development   Coordination with care providers   Monitor for use of substances    Discharge Criteria:  Satisfactory progress toward treatment goals   Improvement re: identified problems and symptoms   Ability to continue recovery at next level of service   Has a discharge plan in place   Has safety/coping plan in place   Ability to maintain sobriety  Regular attendance as scheduled     Areas of Treatment Focus        Area of Treatment Focus:   Personal Safety  Start Date:    6/21/2021    Dimension:   III. Emotional / Behavioral Condition    Description:    Ward was admitted to inpatient overnight (6/6-6/7/2021) due to overdose on medications.  Per DA on 6/10/2021: Per patient, two times prior to Saturday for attempts. I believe I tried with pills before. Prior to Saturday I was probably like age 12. No SI since leaving the hospital. Not having any SI prior to Saturday just strong thoughts Saturday that lead to the attempt.     Goal:  Target Date: 7/19/2021 Status: Stopped  Client will notify staff when needing assistance to develop or implement a coping plan to manage suicidal or self injurious urges.  Client will use coping plan for safety, as needed.      Progress: 6/21/2021:  Met with Ward.  Program is going well.  No safety or self harm concerns at this time.  Not sure she has a copy of her safety plan so writer will send her copy via [employer]. Continue goals. 7/20/21 - No changes, no SI/SIB         Treatment Strategies:   Assist clients in establishing / strengthening support network  Assess / reassess level of potential for harm to self or others  Engage in safety planning when indicated  Teach adaptive coping skills and communication skills        Area of Treatment Focus:   Symptom  "Stabilization and Management  Start Date:    7/19/2021    Dimension:   III. Emotional / Behavioral Condition    Description:    Chief Complaint (From DA completed on 6/10/2021):   The reason for seeking services at this time is: \"interested in program because I have become dependent on alcohol and smoking marijuana doing that so long and so often it has effected my josiane and feel I need it, and do not want to be reliant in that. I know I have coping skills and things to utilize them, fall back into drinking or smoking\".   The problem(s) began drinking has been a problem for over a year now but the past couple months I have noticed it has been really bad, I just feel I want to drink and do not want to feel like that. Patient has attempted to resolve these concerns in the past through psychiatry.     Goal:  Target Date: 7/19/2021 Status: Stopped  Will report on symptoms and identify skills to use to manage depression and anxiety as well as maintain sobriety each week     Ward will work on creating a balanced schedule for herself, especially as she return to work to make sure she is making time for self care and stress management each week.       Progress: 6/21/2021:  Met with Nejuan.  Returning to work at about 35hrs/week plus coming to group.  Will focus on making time for self care, stress management in her daily schedule.   7/20/21 - Patient has resumed work, was manageable initially, and then work related stress has become heightened; wants to go back to school in order to gain skills to improve job opportunities that are more in alignment with values. 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 Focusing on the future.       Treatment Strategies:   Assist clients in establishing / strengthening support network  Assess / reassess for appropriate therapy program involvement, encourage participation in therapies  Facilitate increased self awareness  Teach adaptive coping skills and communication skills        Area of " "Treatment Focus:   Abstinence / Relapse Prevention  Start Date:    6/21/2021    Dimension:   I. Acute Intoxication / Withdrawal Potential, V. Relapse and VI. Recovery Environment    Description (Per DA completed on 6/10/2021):    Patient reports experiencing the following withdrawal symptoms within the past 12 months: sad/depressed feeling, irritability and anxiety/worry and the following within the past 30 days: agitation, sad/depressed feeling, irritability and anxiety/worry.   Patients reports urges to use Alcohol and Cannabis/ Hashish.  Patient reports she has used more Alcohol and Cannabis/ Hashish than intended or over a longer period of time than intended. Patient reports she has had unsuccessful attempts to cut down or control use of Alcohol and Cannabis/ Hashish.  Patient reports she has needed to use more Alcohol and Cannabis/ Hashish to achieve the same effect.  Patient does  report diminished effect with use of same amount of Alcohol and Cannabis/ Hashish. Scored 3 out of 4 on CAGE-AID.     Goal:  Target Date: 7/19/2021 Status: Stopped  Ward will report on relapse prevention skills she is using to maintain her sobriety, planning and problem solving as needed.  Ward will ask for assistance from staff as needed.      Progress:7/20/21 - reports Sobriety has been hard, tempted to go home and drink/smoke after work; flushed weed a couple weeks ago. \"If I can do it and have done it before, I can keep going.\" Has future goals and vision for future that keep her motivated. Also holds strong to her devendra. No recovery groups at this time.       Treatment Strategies:   Assist clients in establishing / strengthening support network  Engage in safety planning when indicated  Provide education regarding relapse prevention skills and stress management skills  Teach adaptive coping skills and communication skills      Area of Treatment Focus:   Community Resources / Support and Discharge Planning  Start Date:    " "6/21/2021    Dimension:   VI. Recovery Environment    Description:    Ward is currently living with her mother.  Returning to part time work (35 hours/week).  Ward reports she is not interested in 12 step groups at this time.     Per DA completed on 6/10/2021: Health Care Follow-up: [facility]- they do have psychological testing but no openings at this time. Please address this with your provider when you meet.  Appointment Date/Time: Needs to reschedule as unable to attend on 6/15/2021 as planned  Psychiatrist/Primary Care Giver: Amy Schwab  Address: [address] [place]  Phone Number: [phone]  Fax: [phone] -In Person  Appointment Date/Time: Monday July 26th @ 10:00 Therapist: Nubia Garrett  Address: [address] [facility] [address]   Phone Number: [phone]\".      Goal:  Target Date: 7/19/2021 Status: Stopped  Will develop an aftercare / transition plan by exploring options for support in the community and establish care with outpatient providers by time of discharge      Progress: 6/21/2021:  Unable to attend 6/15/2021 appointment for psychiatry.  Needs to call and reschedule.  Has an upcoming therapy appointment next month.  Continue goal.   7/20/21 - Continuing to build support system, has individual therapy appt 7/26/21; plans to discharge to ADT       Treatment Strategies:   Assist clients in establishing / strengthening support network  Assist with discharge planning  Facilitate increased self awareness  Provide education regarding available community resources  Teach adaptive coping skills and communication skills     Malie Andrews, OTR/L      NOTE: Required signatures are completed manually and scanned into the electronic medical record. See \"Media\" tab in epic.    The Individualized Treatment Plan Signature Page has been routed to the provider for co-sign.     "

## 2021-06-21 NOTE — GROUP NOTE
Psychoeducation Group Note    PATIENT'S NAME: Robert Steele  MRN:   [id number]  :   2000  ACCT. NUMBER: [id number]  DATE OF SERVICE: 21  START TIME: 11:00 AM  END TIME: 11:50 AM  FACILITATOR: Clementina Chu RN  TOPIC: HE RN Group: Specialty Health                                    Service Modality:  Video Visit     Telemedicine Visit: The patient's condition can be safely assessed and treated via synchronous audio and visual telemedicine encounter.      Reason for Telemedicine Visit:  covid19    Originating Site (Patient Location): Patient's home    Distant Site (Provider Location): Provider Remote Setting- Home Office    Consent:  The patient/guardian has verbally consented to: the potential risks and benefits of telemedicine (video visit) versus in person care; bill my insurance or make self-payment for services provided; and responsibility for payment of non-covered services.     Patient would like the video invitation sent by:  My Chart    Mode of Communication:  Video Conference via Medical Zoom    As the provider I attest to compliance with applicable laws and regulations related to telemedicine.          [facility] Adult Dual Diagnosis Day Treatment  TRACK: 1    NUMBER OF PARTICIPANTS: 8    Summary of Group / Topics Discussed:  Specialty Health:  Specialty Health: Vulnerability and group support - Continuing conversation from Baltazar Miller's Vulnerability and Shame videos, patients shared some things that this meant to them and offered support.    Patient Session Goals / Objectives:  ? Patients discussed strengths and what it means to be vulnerable  ? Patients build stronger group cohesion through sharing and supporting each other.                Patient Participation / Response:  Fully participated with the group by sharing personal reflections / insights and openly received / provided feedback with other participants.    Demonstrated understanding of topics discussed through group  discussion and participation and Identified / Expressed personal readiness to practice skills    Treatment Plan:  Patient has a current master individualized treatment plan.  See Epic treatment plan for more information.    Clementina Chu RN

## 2021-06-22 ENCOUNTER — HOSPITAL ENCOUNTER (OUTPATIENT)
Dept: BEHAVIORAL HEALTH | Facility: CLINIC | Age: 21
End: 2021-06-22
Attending: PSYCHIATRY & NEUROLOGY
Payer: COMMERCIAL

## 2021-06-22 PROCEDURE — 90853 GROUP PSYCHOTHERAPY: CPT | Mod: 95 | Performed by: PSYCHOLOGIST

## 2021-06-22 NOTE — GROUP NOTE
Psychotherapy Group Note    PATIENT'S NAME: Robert Steele  MRN:   [id number]  :   2000  ACCT. NUMBER: [id number]  DATE OF SERVICE: 21  START TIME:  9:00 AM  END TIME:  9:50 AM  FACILITATOR: Aaron Millard LP  TOPIC:  EBP Group: DDP Relapse Prevention  [facility] Adult Dual Diagnosis Day Treatment  TRACK: 1    NUMBER OF PARTICIPANTS: 7    Telemedicine Visit: The patient's condition can be safely assessed and treated via synchronous audio and visual telemedicine encounter.      Reason for Telemedicine Visit: Services only offered telehealth    Originating Site (Patient Location): Patient's home    Distant Site (Provider Location): Provider Remote Setting- Home Office    Consent:  The patient/guardian has verbally consented to: the potential risks and benefits of telemedicine (video visit) versus in person care; bill my insurance or make self-payment for services provided; and responsibility for payment of non-covered services.     Mode of Communication:  Video Conference via [employer]    As the provider I attest to compliance with applicable laws and regulations related to telemedicine.      Summary of Group / Topics Discussed:  DDP Relapse Prevention: Relationship Mapping: Patients identified different types of relationships they have in their life and examined if there is conflict or closeness, the degree of conflict or closeness, and the reason for conflict. The goal of this topic is to help patients gain awareness of the relationships they have with others, identify types of conflict in patients  lives and how they impact symptoms/functioning, and identify how they can improve relationships with relationship and interpersonal skills they have learned.     Patient Session Goals / Objectives:    Familiarized patients with awareness to the different types of relationships they may have with different people and substances in their lives    Discussed and practiced strategies  to promote healthier understanding of interpersonal relationships with a focus on awareness of conflict, the causes of conflict, and the ways to transform conflict    Discussed the use of substances and its impact on their relationships      Patient Participation / Response:  Fully participated with the group by sharing personal reflections / insights and openly received / provided feedback with other participants.    Demonstrated understanding of topics discussed through group discussion and participation    Treatment Plan:  Patient has a current master individualized treatment plan.  See Epic treatment plan for more information.    Aaron Millard, LP

## 2021-06-22 NOTE — GROUP NOTE
Process Group Note    PATIENT'S NAME: Robert Steele  MRN:   [id number]  :   2000  ACCT. NUMBER: [id number]  DATE OF SERVICE: 21  START TIME:  9:00 AM  END TIME:  9:50 AM  FACILITATOR: Aaron Millard LP  TOPIC:  Process Group    Diagnoses:  296.33 (F33.2) Major Depressive Disorder, Recurrent Episode, Severe With anxious distress  300.02 (F41.1) Generalized Anxiety Disorder.  4. Other Diagnoses that is relevant to services:  Substance-Related & Addictive Disorders Alcohol Use Disorder   303.90 (F10.20) Severe  304.30 (F12.20) Cannabis Use Disorder Severe   296.33 (F33.2) Major Depressive Disorder, Recurrent Episode, Severe With anxious distress  300.02 (F41.1) Generalized Anxiety Disorder.  4. Other Diagnoses that is relevant to services:  Substance-Related & Addictive Disorders Alcohol Use Disorder   303.90 (F10.20) Severe  304.30 (F12.20) Cannabis Use Disorder Severe     [facility] Adult Dual Diagnosis Day Treatment  TRACK: 1    NUMBER OF PARTICIPANTS: 8    Telemedicine Visit: The patient's condition can be safely assessed and treated via synchronous audio and visual telemedicine encounter.      Reason for Telemedicine Visit: Services only offered telehealth    Originating Site (Patient Location): Patient's home    Distant Site (Provider Location): Provider Remote Setting- Home Office    Consent:  The patient/guardian has verbally consented to: the potential risks and benefits of telemedicine (video visit) versus in person care; bill my insurance or make self-payment for services provided; and responsibility for payment of non-covered services.     Mode of Communication:  Video Conference via [employer]    As the provider I attest to compliance with applicable laws and regulations related to telemedicine.            Data:    Session content: At the start of this group, patients were invited to check in by identifying themselves, describing their current emotional status, and  identifying issues to address in this group.   Area(s) of treatment focus addressed in this session included Symptom Management, Personal Safety, Community Resources/Discharge Planning, Abstinence/Relapse Prevention, Develop / Improve Independent Living Skills and Develop Socialization / Interpersonal Relationship Skills.    Pt reports she is good. She is working on time management. Pt reports perseverance.     Therapeutic Interventions/Treatment Strategies:  Psychotherapist offered support, feedback and validation.    Assessment:    Patient response:   Patient responded to session by giving feedback, listening, being attentive and appearing alert    Possible barriers to participation / learning include: and no barriers identified    Health Issues:   None reported       Substance Use Review:   Substance Use: Substance use has decreased    Mental Status/Behavioral Observations  Appearance:   Appropriate   Eye Contact:   Good   Psychomotor Behavior: Normal   Attitude:   Cooperative   Orientation:   All  Speech   Rate / Production: Normal    Volume:  Normal   Mood:    Normal  Affect:    Appropriate   Thought Content:   Clear  Thought Form:  Coherent  Logical     Insight:    Good     Plan:     Safety Plan: Recommended that patient call 911 or go to the local ED should there be a change in any of these risk factors.     Barriers to treatment: None identified    Patient Contracts (see media tab):  None    Substance Use: Stage of Change: Action     Continue or Discharge: Patient will continue in Adult Dual Disorder Program (DDP) as planned. Patient is likely to benefit from learning and using skills as they work toward the goals identified in their treatment plan.      Aaron Millard LP  June 21, 2021

## 2021-06-22 NOTE — GROUP NOTE
Psychoeducation Group Note    PATIENT'S NAME: Robert Steele  MRN:   [id number]  :   2000  ACCT. NUMBER: [id number]  DATE OF SERVICE: 21  START TIME: 10:00 AM  END TIME: 10:50 AM  FACILITATOR: Maile Andrews OTR/L  TOPIC: MH Life Skills Group: Lifestyle Balance and Structure  [facility] Adult Dual Diagnosis Day Treatment  TRACK: 1    NUMBER OF PARTICIPANTS: 7                                      Service Modality:  Video Visit     Telemedicine Visit: The patient's condition can be safely assessed and treated via synchronous audio and visual telemedicine encounter.      Reason for Telemedicine Visit: Services only offered telehealth    Originating Site (Patient Location): Patient's home    Distant Site (Provider Location): [facility]    Consent:  The patient/guardian has verbally consented to: the potential risks and benefits of telemedicine (video visit) versus in person care; bill my insurance or make self-payment for services provided; and responsibility for payment of non-covered services.     Patient would like the video invitation sent by:  My Chart    Mode of Communication:  Video Conference via Medical Zoom    As the provider I attest to compliance with applicable laws and regulations related to telemedicine.         Summary of Group / Topics Discussed:  Lifestyle Balance and Strucure:  Goal-setting & integration: Patients were introduced to the process and benefits of setting realistic, timely, and achievable goals to help support their ability to follow through with meaningful personal, health, recovery and treatment goals that they would like to achieve to improve overall functioning.  Patients were also taught and then practiced techniques to manage a variety of obstacles to achieve their goals and how to break goals into manageable pieces.  Patients also reported on follow through of goals.     Patient Session Goals /  Objectives:    Identified and wrote meaningful goals to engage in meaningful activities of daily living     Identified and problem solved barriers to achieving goals     Identified plan to support follow through on goals and reflection on progress made          Patient Participation / Response:  Fully participated with the group by sharing personal reflections / insights and openly received / provided feedback with other participants.    Patient presentation: discussed challenges with time management and using longer term goals to motivate herself; set some goals for herself for the week ahead, Verbalized understanding of content and Patient would benefit from additional opportunities to practice the content to be able to generalize it to their everyday life with increased intentionality, consistency, and efficacy in support of their psychiatric recovery    Met with Ward to complete her treatment plan today.  Finding the program helpful.  Copy of treatment plan sent to her via [employer].    Treatment Plan:  Patient has a current master individualized treatment plan.  See Epic treatment plan for more information.    Maile Anderws, OTR/L

## 2021-06-22 NOTE — GROUP NOTE
"Process Group Note    PATIENT'S NAME: Robert Steele  MRN:   [id number]  :   2000  ACCT. NUMBER: [id number]  DATE OF SERVICE: 21  START TIME: 10:00 AM  END TIME: 10:50 AM  FACILITATOR: Nichelle Castaneda LPCC  TOPIC: MH Process Group    Diagnoses:  ***    [facility] Adult Dual Diagnosis Day Treatment  TRACK: 1    NUMBER OF PARTICIPANTS: 6    Telemedicine Visit: The patient's condition can be safely assessed and treated via synchronous audio and visual telemedicine encounter.      Reason for Telemedicine Visit: Services only offered telehealth    Originating Site (Patient Location): Patient's home    Distant Site (Provider Location): Provider Remote Setting- Home Office    Consent:  The patient/guardian has verbally consented to: the potential risks and benefits of telemedicine (video visit) versus in person care; bill my insurance or make self-payment for services provided; and responsibility for payment of non-covered services.     Mode of Communication:  Video Conference via [employer]    As the provider I attest to compliance with applicable laws and regulations related to telemedicine.            Data:    Session content: At the start of this group, patients were invited to check in by identifying themselves, describing their current emotional status, and identifying issues to address in this group.   Area(s) of treatment focus addressed in this session included {OP BEH ADULT MH AREA OF TREATMENT FOCUS:689239}.  ***    Therapeutic Interventions/Treatment Strategies:  {OP  THERAPEUTIC INTERVENTIONS/TREATMENT:106805}    Assessment:    Patient response:   Patient responded to session by {PATIENT RESPONSE:550538}    Possible barriers to participation / learning include: {POSSIBLE BARRIERS:529821}    Health Issues:   {YES / NO:057155}       Substance Use Review:   Substance Use: {YES / NO:953793}    Mental Status/Behavioral Observations  Appearance:   {Appearance:055154::\"Appropriate \"}  Eye " "Contact:   {Eye Contact:990942::\"Good \"}  Psychomotor Behavior: {Psychomotor Behavior:501806::\"Normal \"}  Attitude:   {Attitude:590527::\"Cooperative \"}  Orientation:   {Orientation:201327::\"All\"}  Speech   Rate / Production: {Speech Rate/Production:983726::\"Normal \"}   Volume:  {Speech Volume:884228::\"Normal \"}  Mood:    {Mood:872196::\"Normal\"}  Affect:    {Affect:988316::\"Appropriate \"}  Thought Content:   {Thought Content with Safety:582383}  Thought Form:  {Thought Form:154700::\"Coherent \",\"Logical \"}    Insight:    {Insight:869642::\"Good \"}    Plan:     Safety Plan: {SAFETY PLAN:022974}     Barriers to treatment: {Barriers to Treatment:474284}    Patient Contracts (see media tab):  {Patient Contracts:239346}    Substance Use: {SUBSTANCE USE ASSESSMENT/INTERVENTION:842140}     Continue or Discharge: {Continue or Discharge:368662}      Nichelle Castaneda, [facility]  June 22, 2021  "

## 2021-06-23 ENCOUNTER — HOSPITAL ENCOUNTER (OUTPATIENT)
Dept: BEHAVIORAL HEALTH | Facility: CLINIC | Age: 21
End: 2021-06-23
Attending: PSYCHIATRY & NEUROLOGY
Payer: COMMERCIAL

## 2021-06-23 PROCEDURE — 90853 GROUP PSYCHOTHERAPY: CPT | Mod: GT | Performed by: COUNSELOR

## 2021-06-23 PROCEDURE — G0177 OPPS/PHP; TRAIN & EDUC SERV: HCPCS | Mod: GT

## 2021-06-23 PROCEDURE — 90853 GROUP PSYCHOTHERAPY: CPT | Mod: 95 | Performed by: PSYCHOLOGIST

## 2021-06-23 NOTE — GROUP NOTE
Psychoeducation Group Note    PATIENT'S NAME: Robert Steele  MRN:   [id number]  :   2000  ACCT. NUMBER: [id number]  DATE OF SERVICE: 21  START TIME: 11:00 AM  END TIME: 11:50 AM  FACILITATOR: Clementina Chu RN  TOPIC: HE RN Group: Mind/Body Practice & Complementary                                    Service Modality:  Video Visit     Telemedicine Visit: The patient's condition can be safely assessed and treated via synchronous audio and visual telemedicine encounter.      Reason for Telemedicine Visit:  covid19    Originating Site (Patient Location): Patient's home    Distant Site (Provider Location): Provider Remote Setting- Home Office    Consent:  The patient/guardian has verbally consented to: the potential risks and benefits of telemedicine (video visit) versus in person care; bill my insurance or make self-payment for services provided; and responsibility for payment of non-covered services.     Patient would like the video invitation sent by:  My Chart    Mode of Communication:  Video Conference via Medical Zoom    As the provider I attest to compliance with applicable laws and regulations related to telemedicine.          [facility] Adult Dual Diagnosis Day Treatment  TRACK: 1    NUMBER OF PARTICIPANTS: 6    Summary of Group / Topics Discussed:  Mind/Body Practice & Complementary Therapies:  Relaxation Techniques: In this group, patients were educated on a variety of relaxation and mindfulness skills to reduce distress and improve coping skills. The skills were taught to the group and then practiced through an organized exercise.     Skills taught & practiced included:  Deep Breathing Exercise, Sensory Exercise, grounding exercises    Patient Session Goals / Objectives:  ? Identified relaxation/grounding skills  ? Explained how the various relaxation skills are practiced  ? Practiced relaxation experiential       Patient Participation / Response:  Fully participated with the group by  sharing personal reflections / insights and openly received / provided feedback with other participants.    Demonstrated understanding of topics discussed through group discussion and participation and Identified / Expressed personal readiness to practice skills    Treatment Plan:  Patient has a current master individualized treatment plan.  See Epic treatment plan for more information.    Clementina Chu RN

## 2021-06-23 NOTE — GROUP NOTE
Psychotherapy Group Note    PATIENT'S NAME: Robert Steele  MRN:   [id number]  :   2000  ACCT. NUMBER: [id number]  DATE OF SERVICE: 21  START TIME: 10:00 AM  END TIME: 10:50 AM  FACILITATOR: Aaron Millard LP  TOPIC:  EBP Group: Cognitive Restructuring  [facility] Adult Dual Diagnosis Day Treatment  TRACK: 1    NUMBER OF PARTICIPANTS: 6    Telemedicine Visit: The patient's condition can be safely assessed and treated via synchronous audio and visual telemedicine encounter.      Reason for Telemedicine Visit: Services only offered telehealth    Originating Site (Patient Location): Patient's home    Distant Site (Provider Location): Provider Remote Setting- Home Office    Consent:  The patient/guardian has verbally consented to: the potential risks and benefits of telemedicine (video visit) versus in person care; bill my insurance or make self-payment for services provided; and responsibility for payment of non-covered services.     Mode of Communication:  Video Conference via [employer]    As the provider I attest to compliance with applicable laws and regulations related to telemedicine.      Summary of Group / Topics Discussed:  Cognitive Restructuring: Distortions: Patients received an overview of how to identify common cognitive distortions. Patients will explore alternatives to cognitive distortions and practice challenging their negative thought patterns. The goal is to help patients target modify ineffective thought patterns.     Patient Session Goals / Objectives:    Familiarized self with ineffective / unhealthy thoughts and how they develop.      Explored impact of ineffective thoughts / distortions on mood and activity    Formulated new neutral/positive alternatives to challenge less helpful / ineffective thoughts.    Practiced and plan to apply in daily life               Patient Participation / Response:  Fully participated with the group by sharing personal  reflections / insights and openly received / provided feedback with other participants.    Demonstrated understanding of topics discussed through group discussion and participation    Treatment Plan:  Patient has a current master individualized treatment plan.  See Epic treatment plan for more information.    Aaron Millard LP

## 2021-06-23 NOTE — GROUP NOTE
"Process Group Note    PATIENT'S NAME: Robert Steele  MRN:   [id number]  :   2000  ACCT. NUMBER: [id number]  DATE OF SERVICE: 21  START TIME:  9:00 AM  END TIME:  9:50 AM  FACILITATOR: Nichelle Castaneda [facility]  TOPIC:  Process Group    Diagnoses:  296.33 (F33.2) Major Depressive Disorder, Recurrent Episode, Severe With anxious distress  300.02 (F41.1) Generalized Anxiety Disorder.  4. Other Diagnoses that is relevant to services:  Substance-Related & Addictive Disorders Alcohol Use Disorder   303.90 (F10.20) Severe  304.30 (F12.20) Cannabis Use Disorder Severe       [facility] Adult Dual Diagnosis Day Treatment  TRACK: 1    NUMBER OF PARTICIPANTS: 6    Telemedicine Visit: The patient's condition can be safely assessed and treated via synchronous audio and visual telemedicine encounter.      Reason for Telemedicine Visit: Services only offered telehealth    Originating Site (Patient Location): Patient's home    Distant Site (Provider Location): Provider Remote Setting- Home Office    Consent:  The patient/guardian has verbally consented to: the potential risks and benefits of telemedicine (video visit) versus in person care; bill my insurance or make self-payment for services provided; and responsibility for payment of non-covered services.     Mode of Communication:  Video Conference via [employer]    As the provider I attest to compliance with applicable laws and regulations related to telemedicine.            Data:    Session content: At the start of this group, patients were invited to check in by identifying themselves, describing their current emotional status, and identifying issues to address in this group.   Area(s) of treatment focus addressed in this session included Symptom Management, Personal Safety and Abstinence/Relapse Prevention.    Ward reported feeling \"tired\" and \"irritated\" today.  Her goal today is to leave the house earlier than usual to get to the office early.  Client " declined additional process time but contributed to group discussion and provided feedback and support to peers.      Therapeutic Interventions/Treatment Strategies:  Psychotherapist offered support, feedback and validation and reinforced use of skills.    Assessment:    Patient response:   Patient responded to session by accepting feedback, giving feedback and listening    Possible barriers to participation / learning include: and no barriers identified    Health Issues:   None reported       Substance Use Review:   Substance Use: Substance use has decreased    Mental Status/Behavioral Observations  Appearance:   Appropriate   Eye Contact:   Good   Psychomotor Behavior: Normal   Attitude:   Cooperative   Orientation:   All  Speech   Rate / Production: Normal    Volume:  Normal   Mood:    Depressed  Irritable   Affect:    Appropriate   Thought Content:   Clear  Thought Form:  Coherent  Logical     Insight:    Good     Plan:     Safety Plan: No current safety concerns identified.  Recommended that patient call 911 or go to the local ED should there be a change in any of these risk factors.     Barriers to treatment: None identified    Patient Contracts (see media tab):  None    Substance Use: Provided encouragement towards sobriety    Provided support and affirmation for steps taken towards sobriety      Continue or Discharge: Patient will continue in Adult Dual Disorder Program (DDP) as planned. Patient is likely to benefit from learning and using skills as they work toward the goals identified in their treatment plan.      Nichelle Castaneda, [facility]  June 23, 2021

## 2021-06-24 ENCOUNTER — HOSPITAL ENCOUNTER (OUTPATIENT)
Dept: BEHAVIORAL HEALTH | Facility: CLINIC | Age: 21
End: 2021-06-24
Attending: PSYCHIATRY & NEUROLOGY
Payer: COMMERCIAL

## 2021-06-24 PROCEDURE — 90853 GROUP PSYCHOTHERAPY: CPT | Mod: 95 | Performed by: COUNSELOR

## 2021-06-24 PROCEDURE — G0177 OPPS/PHP; TRAIN & EDUC SERV: HCPCS | Mod: GT | Performed by: OCCUPATIONAL THERAPIST

## 2021-06-24 NOTE — GROUP NOTE
"Process Group Note    PATIENT'S NAME: Robert Steele  MRN:   [id number]  :   2000  ACCT. NUMBER: [id number]  DATE OF SERVICE: 21  START TIME: 10:00 AM  END TIME: 10:50 AM  FACILITATOR: Nichelle Castaneda [facility]  TOPIC:  Process Group    Diagnoses:  296.33 (F33.2) Major Depressive Disorder, Recurrent Episode, Severe With anxious distress  300.02 (F41.1) Generalized Anxiety Disorder.  4. Other Diagnoses that is relevant to services:  Substance-Related & Addictive Disorders Alcohol Use Disorder   303.90 (F10.20) Severe  304.30 (F12.20) Cannabis Use Disorder Severe       [facility] Adult Dual Diagnosis Day Treatment  TRACK: 1    NUMBER OF PARTICIPANTS: 7    Telemedicine Visit: The patient's condition can be safely assessed and treated via synchronous audio and visual telemedicine encounter.      Reason for Telemedicine Visit: Services only offered telehealth    Originating Site (Patient Location): Patient's home    Distant Site (Provider Location): Provider Remote Setting- Home Office    Consent:  The patient/guardian has verbally consented to: the potential risks and benefits of telemedicine (video visit) versus in person care; bill my insurance or make self-payment for services provided; and responsibility for payment of non-covered services.     Mode of Communication:  Video Conference via [employer]    As the provider I attest to compliance with applicable laws and regulations related to telemedicine.            Data:    Session content: At the start of this group, patients were invited to check in by identifying themselves, describing their current emotional status, and identifying issues to address in this group.   Area(s) of treatment focus addressed in this session included Symptom Management, Personal Safety and Abstinence/Relapse Prevention.    Ward reported feeling \"less tired than yesterday\" and her goal for the day is to get to work early.  Client declined additional process time but " contributed to group discussion and provided feedback and support to peers.      Therapeutic Interventions/Treatment Strategies:  Psychotherapist offered support, feedback and validation and reinforced use of skills.    Assessment:    Patient response:   Patient responded to session by accepting feedback, giving feedback and listening    Possible barriers to participation / learning include: and no barriers identified    Health Issues:   None reported       Substance Use Review:   Substance Use: Substance use has decreased    Mental Status/Behavioral Observations  Appearance:   Appropriate   Eye Contact:   Good   Psychomotor Behavior: Normal   Attitude:   Cooperative   Orientation:   All  Speech   Rate / Production: Normal    Volume:  Normal   Mood:    Depressed   Affect:    Appropriate   Thought Content:   Clear  Thought Form:  Coherent  Logical     Insight:    Good     Plan:     Safety Plan: No current safety concerns identified.  Recommended that patient call 911 or go to the local ED should there be a change in any of these risk factors.     Barriers to treatment: None identified    Patient Contracts (see media tab):  None    Substance Use: Provided encouragement towards sobriety    Provided support and affirmation for steps taken towards sobriety      Continue or Discharge: Patient will continue in Adult Dual Disorder Program (DDP) as planned. Patient is likely to benefit from learning and using skills as they work toward the goals identified in their treatment plan.      Nichelle Castaneda, [facility]  June 24, 2021

## 2021-06-24 NOTE — GROUP NOTE
Psychotherapy Group Note    PATIENT'S NAME: Robert Steele  MRN:   [id number]  :   2000  ACCT. NUMBER: [id number]  DATE OF SERVICE: 21  START TIME: 11:00 AM  END TIME: 11:50 AM  [facility]ILIT[facility]  TOPIC: MH EBP Group: Cognitive Restructuring  [facility] Adult Dual Diagnosis Day Treatment  TRACK: 1    NUMBER OF PARTICIPANTS: 7    Telemedicine Visit: The patient's condition can be safely assessed and treated via synchronous audio and visual telemedicine encounter.      Reason for Telemedicine Visit: Services only offered telehealth    Originating Site (Patient Location): Patient's home    Distant Site (Provider Location): Provider Remote Setting- Home Office    Consent:  The patient/guardian has verbally consented to: the potential risks and benefits of telemedicine (video visit) versus in person care; bill my insurance or make self-payment for services provided; and responsibility for payment of non-covered services.     Mode of Communication:  Video Conference via "[employer]    As the provider I attest to compliance with applicable laws and regulations related to telemedicine.      Summary of Group / Topics Discussed:  Cognitive Restructuring: Core Beliefs: Patients received an overview of what a core belief is, and how they develop. Patients then began to identify their negative core beliefs. Patients worked to modify core beliefs with the goal of improved self-image and functioning.     Patient Session Goals / Objectives:    Familiarize self with the concept of core beliefs and how they develop.      Explore personal core beliefs (positive and negative)    Develop / advance recognition of the connection between negative thoughts and negative core beliefs.    Formulate new neutral/positive core beliefs               Patient Participation / Response:  Fully participated with the group by sharing personal reflections / insights and openly received / provided feedback with other  participants.    Demonstrated understanding of topics discussed through group discussion and participation, Expressed understanding of the relationship between behaviors, thoughts, and feelings, Demonstrated knowledge of personal thought patterns and how they impact their mood and behavior. and Identified barriers to changing thought patterns    Treatment Plan:  Patient has a current master individualized treatment plan.  See Epic treatment plan for more information.    Nichelle Castaneda, [facility]

## 2021-06-24 NOTE — GROUP NOTE
Psychoeducation Group Note    PATIENT'S NAME: Robert Steele  MRN:   [id number]  :   2000  ACCT. NUMBER: [id number]  DATE OF SERVICE: 21  START TIME:  9:00 AM  END TIME:  9:50 AM  FACILITATOR: Maile Andrews OTR/L  TOPIC: MH Life Skills Group: Lifestyle Balance and Structure  [facility] Adult Dual Diagnosis Day Treatment  TRACK: 1    NUMBER OF PARTICIPANTS: 7                                      Service Modality:  Video Visit     Telemedicine Visit: The patient's condition can be safely assessed and treated via synchronous audio and visual telemedicine encounter.      Reason for Telemedicine Visit: Services only offered telehealth    Originating Site (Patient Location): Patient's home    Distant Site (Provider Location): [facility]    Consent:  The patient/guardian has verbally consented to: the potential risks and benefits of telemedicine (video visit) versus in person care; bill my insurance or make self-payment for services provided; and responsibility for payment of non-covered services.     Patient would like the video invitation sent by:  My Chart    Mode of Communication:  Video Conference via [employer]    As the provider I attest to compliance with applicable laws and regulations related to telemedicine.         Summary of Group / Topics Discussed:  Lifestyle Balance and Strucure:  Time Management: Time for Tips and Tips for Time: Patients were introduced to how effective time management is beneficial to self esteem, relationships with others, life balance, and other aspects of daily life.   Patients were taught strategies on how to improve time management skills.    Patient Session Goals / Objectives:    Facilitated the discussion on challenges/barriers and personal situations with time management     Identified specific techniques and strategies to improve time management to support mental health recovery       Identified  a plan to implement strategies into daily life to support improved time management         Patient Participation / Response:  Fully participated with the group by sharing personal reflections / insights and openly received / provided feedback with other participants.    Patient presentation: active in sharing ideas and insights with the group, Verbalized understanding of content and Patient would benefit from additional opportunities to practice the content to be able to generalize it to their everyday life with increased intentionality, consistency, and efficacy in support of their psychiatric recovery    Treatment Plan:  Patient has a current master individualized treatment plan.  See Epic treatment plan for more information.    Maile Andrews, OTR/L

## 2021-06-25 ENCOUNTER — HOSPITAL ENCOUNTER (OUTPATIENT)
Dept: BEHAVIORAL HEALTH | Facility: CLINIC | Age: 21
End: 2021-06-25
Attending: PSYCHIATRY & NEUROLOGY
Payer: COMMERCIAL

## 2021-06-25 PROCEDURE — G0177 OPPS/PHP; TRAIN & EDUC SERV: HCPCS | Mod: GT | Performed by: OCCUPATIONAL THERAPIST

## 2021-06-25 PROCEDURE — 90853 GROUP PSYCHOTHERAPY: CPT | Mod: 95 | Performed by: COUNSELOR

## 2021-06-25 PROCEDURE — G0177 OPPS/PHP; TRAIN & EDUC SERV: HCPCS | Mod: GT

## 2021-06-25 NOTE — GROUP NOTE
Process Group Note    PATIENT'S NAME: Robert Steele  MRN:   [id number]  :   2000  ACCT. NUMBER: [id number]  DATE OF SERVICE: 21  START TIME: 1000  END TIME: 1050  FACILITATOR: Nichelle Castaneda [facility]  TOPIC:  Process Group    Diagnoses:  296.33 (F33.2) Major Depressive Disorder, Recurrent Episode, Severe With anxious distress  300.02 (F41.1) Generalized Anxiety Disorder.  4. Other Diagnoses that is relevant to services:  Substance-Related & Addictive Disorders Alcohol Use Disorder   303.90 (F10.20) Severe  304.30 (F12.20) Cannabis Use Disorder Severe       [facility] Adult Dual Diagnosis Day Treatment  TRACK: 1    NUMBER OF PARTICIPANTS: 6    Telemedicine Visit: The patient's condition can be safely assessed and treated via synchronous audio and visual telemedicine encounter.      Reason for Telemedicine Visit: Services only offered telehealth    Originating Site (Patient Location): Patient's home    Distant Site (Provider Location): Provider Remote Setting- Home Office    Consent:  The patient/guardian has verbally consented to: the potential risks and benefits of telemedicine (video visit) versus in person care; bill my insurance or make self-payment for services provided; and responsibility for payment of non-covered services.     Mode of Communication:  Video Conference via [employer]    As the provider I attest to compliance with applicable laws and regulations related to telemedicine.            Data:    Session content: At the start of this group, patients were invited to check in by identifying themselves, describing their current emotional status, and identifying issues to address in this group.   Area(s) of treatment focus addressed in this session included Symptom Management, Personal Safety and Abstinence/Relapse Prevention.    Ward reported feeling tired today.  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  She reported she has been having a lot of trouble sleeping  lately because she just lays there and can't fall asleep.  Client declined additional process time but contributed to group discussion and provided feedback and support to peers.      Therapeutic Interventions/Treatment Strategies:  Psychotherapist offered support, feedback and validation.    Assessment:    Patient response:   Patient responded to session by accepting feedback, giving feedback and listening    Possible barriers to participation / learning include: and no barriers identified    Health Issues:   None reported       Substance Use Review:   Substance Use: Substance use has decreased    Mental Status/Behavioral Observations  Appearance:   Appropriate   Eye Contact:   Good   Psychomotor Behavior: Normal   Attitude:   Cooperative   Orientation:   All  Speech   Rate / Production: Normal    Volume:  Normal   Mood:    Depressed   Affect:    Appropriate   Thought Content:   Clear  Thought Form:  Coherent  Logical     Insight:    Good     Plan:     Safety Plan: No current safety concerns identified.  Recommended that patient call 911 or go to the local ED should there be a change in any of these risk factors.     Barriers to treatment: None identified    Patient Contracts (see media tab):  None    Substance Use: Provided encouragement towards sobriety    Provided support and affirmation for steps taken towards sobriety      Continue or Discharge: Patient will continue in Adult Dual Disorder Program (DDP) as planned. Patient is likely to benefit from learning and using skills as they work toward the goals identified in their treatment plan.      Nichelle Castaneda, [facility]  June 25, 2021

## 2021-06-25 NOTE — GROUP NOTE
Psychoeducation Group Note    PATIENT'S NAME: Robert Steele  MRN:   [id number]  :   2000  ACCT. NUMBER: [id number]  DATE OF SERVICE: 21  START TIME: 11:00 AM  END TIME: 11:50 AM  FACILITATOR: Maile Andrews OTR/L  TOPIC: MH Life Skills Group: Lifestyle Balance and Structure  [facility] Adult Dual Diagnosis Day Treatment  TRACK: 1    NUMBER OF PARTICIPANTS: 6                                      Service Modality:  Video Visit     Telemedicine Visit: The patient's condition can be safely assessed and treated via synchronous audio and visual telemedicine encounter.      Reason for Telemedicine Visit: Services only offered telehealth    Originating Site (Patient Location): Patient's home    Distant Site (Provider Location): [facility]    Consent:  The patient/guardian has verbally consented to: the potential risks and benefits of telemedicine (video visit) versus in person care; bill my insurance or make self-payment for services provided; and responsibility for payment of non-covered services.     Patient would like the video invitation sent by:  My Chart    Mode of Communication:  Video Conference via Medical Zoom    As the provider I attest to compliance with applicable laws and regulations related to telemedicine.         Summary of Group / Topics Discussed:  Lifestyle Balance and Strucure:  Routines, Habits, Rituals, and Roles: Patients were assisted to identify meaningful routines that they want to promote and the impact their mental health symptoms have on this.  Patients learned, applied, and generalized skills needed to live and participate in meaningful routines as effectively and independently as possible.  Patients developed awareness of strengths and challenges in fulfilling these routines and worked on integrating specific and individualized rituals and habits into their daily life.     Patient Session Goals /  Objectives:    Improved awareness and engagement in life s meaningful roles, routines, habits, and rituals    Explored and identified current routines and challenges due to mental health symptoms     Identified ways to establish and integrate daily self care and wellness routines and habits to support mental health recovery    Practiced and reflected on how to generalize taught skills to their everyday life      Patient Participation / Response:  Fully participated with the group by sharing personal reflections / insights and openly received / provided feedback with other participants.    Patient presentation: laying down most of group but participated in turn; working on self care; energy level was low, Verbalized understanding of content and Patient would benefit from additional opportunities to practice the content to be able to generalize it to their everyday life with increased intentionality, consistency, and efficacy in support of their psychiatric recovery    Treatment Plan:  Patient has a current master individualized treatment plan.  See Epic treatment plan for more information.    Maile Andrews, OTR/L

## 2021-06-25 NOTE — ED NOTES
" called - it will take about 1 hr until they make contact. At this time - pt is calm, cooperative and pleasant. She is undressed, placed into a gown until medically cleared, 1:1 is in the room, belongings are secured - pt has 2 piercings in face - unable to remove, per her and verified by another staff member these are not able to be removed. Pt further explains - today she was triggered by her mother's home and her mother asking her to get things for a birthday party. Pt lives alone with her mother, pt states her it is a \"horters\" home \"my mom has a shopping problem and there are a lot things everywhere\", \"I want to live on my own\", today pts mother wanted her to get things for a party they were to attend, pt stated being so hot she wasn't comfortable driving her car, it needs an oil change and she just started a new job, she was afraid the car would die on the road and she didn't want to be stranded in the heat.  Pt does not want her mother called about her admission. Pt states her zolft was prescrived by her primary doctor, she talked with a psychiatrist once - she states after he took a survey, he called the police and had her taken to [facility], she states she was discharged shortly after. Pt has no other hx of psych admissions.   "

## 2021-06-25 NOTE — GROUP NOTE
Psychoeducation Group Note    PATIENT'S NAME: Robert Steele  MRN:   [id number]  :   2000  ACCT. NUMBER: [id number]  DATE OF SERVICE: 21  START TIME:  9:00 AM  END TIME:  9:50 AM  FACILITATOR: Clementina Chu RN  TOPIC: HE RN Group: Health Maintenance                                    Service Modality:  Video Visit     Telemedicine Visit: The patient's condition can be safely assessed and treated via synchronous audio and visual telemedicine encounter.      Reason for Telemedicine Visit:  covid19    Originating Site (Patient Location): Patient's home    Distant Site (Provider Location): Provider Remote Setting- Home Office    Consent:  The patient/guardian has verbally consented to: the potential risks and benefits of telemedicine (video visit) versus in person care; bill my insurance or make self-payment for services provided; and responsibility for payment of non-covered services.     Patient would like the video invitation sent by:  My Chart    Mode of Communication:  Video Conference via Medical Zoom    As the provider I attest to compliance with applicable laws and regulations related to telemedicine.          [facility] Adult Dual Diagnosis Day Treatment  TRACK: 1    NUMBER OF PARTICIPANTS: 6    Summary of Group / Topics Discussed:  Health Maintenance: Weekend planning: Patients were given time to complete a weekend plan of what they will do to promote wellness and sobriety over the weekend when they do not have the structure of group. Patients were encouraged to review progress on their treatment goals and were challenged to identify ways to work toward meeting them. Patients identified and discussed foreseeable barriers to success over the weekend and then developed a plan to overcome them. Patients reviewed their distress coping skills and social support network and discussed this with the group.       Patient Session Goals / Objectives:    ?    Identified activities to engage in  that promote balance in wellness  ?    Distinguished possible barriers to success over the weekend and created a plan to overcome them  ?    Listed distress coping skills and identified social support network to utilize if in crisis during the weekend          Patient Participation / Response:  Fully participated with the group by sharing personal reflections / insights and openly received / provided feedback with other participants.    Demonstrated understanding of topics discussed through group discussion and participation and Identified / Expressed personal readiness to practice skills    Treatment Plan:  Patient has a current master individualized treatment plan.  See Epic treatment plan for more information.    Clementina Chu RN

## 2021-06-25 NOTE — ED TRIAGE NOTES
Patient is here after taking 21 Zoloft and 11 hydroxyzine today at 1400 with two beers. She then vomited at 3 pm.  She then took a nap woke up at 1800 and reports a severe headache and lost her ability to walk. She did transfer out of the car with no issues. She did due this in attempt to commit suicide. She has attempted in the past but has not been hospitalized.

## 2021-06-26 NOTE — ED PROVIDER NOTES
eMERGENCY dEPARTMENT eNCOUnter      CHIEF COMPLAINT    Chief Complaint   Patient presents with     Suicidal     Drug Overdose       HPI    Robert Steele is a 21 y.o. female with no pertinent history who presents to this ED by personal vehicle for evaluation of suicidal, drug overdose.    The patient presents today after attempting to end her life by drug overdose.  She reports she is having problems at home and was 'triggered' today.  She took 21 Zoloft and 11 Hydroxyzine with 2 beers at ~1400 today. After taking these medications, she reports sleeping off and on throughout the afternoon.  She also vomited, had a headache, and currently feels weak and foggy.  The patient reports she takes Zoloft normally.  The patient denies any other medicinal problems.    The patient denies any other symptoms at this time.     PAST MEDICAL HISTORY    History reviewed. No pertinent past medical history.  History reviewed. No pertinent surgical history.    CURRENT MEDICATIONS      Current Facility-Administered Medications:      [COMPLETED] Insert peripheral IV, , , Once **AND** [COMPLETED] Saline lock IV, , , Once **AND** sodium chloride flush 10 mL (NS), 10 mL, Intravenous, PRN, Jorge Luis Martinez MD  No current outpatient medications on file.    ALLERGIES    No Known Allergies    FAMILY HISTORY    History reviewed. No pertinent family history.    SOCIAL HISTORY    Social History     Socioeconomic History     Marital status: Single     Spouse name: None     Number of children: None     Years of education: None     Highest education level: None   Occupational History     None   Social Needs     Financial resource strain: None     Food insecurity     Worry: None     Inability: None     Transportation needs     Medical: None     Non-medical: None   Tobacco Use     Smoking status: None   Substance and Sexual Activity     Alcohol use: None     Drug use: None     Sexual activity: None   Lifestyle     Physical activity     Days per  "week: None     Minutes per session: None     Stress: None   Relationships     Social connections     Talks on phone: None     Gets together: None     Attends Yarsanism service: None     Active member of club or organization: None     Attends meetings of clubs or organizations: None     Relationship status: None     Intimate partner violence     Fear of current or ex partner: None     Emotionally abused: None     Physically abused: None     Forced sexual activity: None   Other Topics Concern     None   Social History Narrative     None       REVIEW OF SYSTEMS      Review of Systems   Gastrointestinal: Positive for vomiting.   Neurological: Positive for weakness and headaches.        Positive for 'feeling foggy'.   Psychiatric/Behavioral: Positive for suicidal ideas (attempt).   All other systems reviewed and are negative.      PHYSICAL EXAM    VITAL SIGNS: /89   Pulse 92   Temp 98.9  F (37.2  C) (Oral)   Resp 28   Ht 5' 9\" (1.753 m)   Wt 212 lb (96.2 kg)   SpO2 97%   BMI 31.31 kg/m      Physical Exam   Constitutional: She is oriented to person, place, and time. She appears well-developed and well-nourished. No distress.   HENT:   Head: Normocephalic and atraumatic.   Eyes: Pupils are equal, round, and reactive to light. Conjunctivae and EOM are normal.   Neck: Neck supple.   Cardiovascular: Normal rate and regular rhythm.   Pulmonary/Chest: Effort normal and breath sounds normal.   Abdominal: Soft. There is no abdominal tenderness.   Musculoskeletal:         General: No edema.   Neurological: She is alert and oriented to person, place, and time.   Skin: Skin is warm and dry.   Psychiatric: She has a normal mood and affect. She expresses suicidal ideation. She expresses suicidal plans.   Nursing note and vitals reviewed.      LABS  Pertinent lab results reviewed in chart.  Results for orders placed or performed during the hospital encounter of 06/05/21   Comprehensive metabolic panel   Result Value Ref " Range    Sodium 142 136 - 145 mmol/L    Potassium 3.5 3.5 - 5.0 mmol/L    Chloride 111 (H) 98 - 107 mmol/L    CO2 22 22 - 31 mmol/L    Anion Gap, Calculation 9 5 - 18 mmol/L    Glucose 116 70 - 125 mg/dL    BUN 6 (L) 8 - 22 mg/dL    Creatinine 0.81 0.60 - 1.10 mg/dL    GFR MDRD Af Amer >60 >60 mL/min/1.73m2    GFR MDRD Non Af Amer >60 >60 mL/min/1.73m2    Bilirubin, Total 0.5 0.0 - 1.0 mg/dL    Calcium 8.9 8.5 - 10.5 mg/dL    Protein, Total 7.2 6.0 - 8.0 g/dL    Albumin 3.6 3.5 - 5.0 g/dL    Alkaline Phosphatase 66 45 - 120 U/L    AST 13 0 - 40 U/L    ALT 13 0 - 45 U/L   Lipase   Result Value Ref Range    Lipase 22 0 - 52 U/L   ETOH Level   Result Value Ref Range    Alcohol, Blood <10 None detected mg/dL   HCG, Qual   Result Value Ref Range    Beta hCG Qualitative Negative Negative   Salicylate (ASA)   Result Value Ref Range    Salicylate <8.0 2.0 - 25.0 mg/dL   Acetaminophen(Tylenol )   Result Value Ref Range    Acetaminophen <3.0 (L) 10.0 - 20.0 ug/mL   HM1 (CBC with Diff)   Result Value Ref Range    WBC 7.5 4.0 - 11.0 thou/uL    RBC 4.98 3.80 - 5.40 mill/uL    Hemoglobin 14.6 12.0 - 16.0 g/dL    Hematocrit 44.1 35.0 - 47.0 %    MCV 89 80 - 100 fL    MCH 29.3 27.0 - 34.0 pg    MCHC 33.1 32.0 - 36.0 g/dL    RDW 12.9 11.0 - 14.5 %    Platelets 254 140 - 440 thou/uL    MPV 11.0 8.5 - 12.5 fL    Neutrophils % 51 50 - 70 %    Lymphocytes % 38 20 - 40 %    Monocytes % 7 2 - 10 %    Eosinophils % 3 0 - 6 %    Basophils % 1 0 - 2 %    Immature Granulocyte % 0 <=0 %    Neutrophils Absolute 3.8 2.0 - 7.7 thou/uL    Lymphocytes Absolute 2.8 0.8 - 4.4 thou/uL    Monocytes Absolute 0.5 0.0 - 0.9 thou/uL    Eosinophils Absolute 0.2 0.0 - 0.4 thou/uL    Basophils Absolute 0.1 0.0 - 0.2 thou/uL    Immature Granulocyte Absolute 0.0 <=0.0 thou/uL   ECG 12 lead nursing unit performed   Result Value Ref Range    SYSTOLIC BLOOD PRESSURE 141 mmHg    DIASTOLIC BLOOD PRESSURE 93 mmHg    VENTRICULAR RATE 110 BPM    ATRIAL RATE 110 BPM     P-R INTERVAL 130 ms    QRS DURATION 86 ms    Q-T INTERVAL 336 ms    QTC CALCULATION (BEZET) 454 ms    P Axis 25 degrees    R AXIS 43 degrees    T AXIS 17 degrees    MUSE DIAGNOSIS       Sinus tachycardia  Nonspecific T wave abnormality  Abnormal ECG  No previous ECGs available  Confirmed by SEE ED PROVIDER NOTE FOR, ECG INTERPRETATION ([id number]),  REDD, BASSAM ([id number]) on 6/5/2021 9:05:39 PM           RADIOLOGY  No results found.      ED COURSE & MEDICAL DECISION MAKING      8:37 PM I met with the patient for the initial interview and physical examination. Discussed plan for treatment and workup in the ED. PPE: Provider wore gloves and a paper mask.  9:07 PM I spoke with poison control.  It was recommended that I continue to watch her labs and vitals.   9:26 PM I spoke with social work.     I did see and examine the patient.  IV was established and she was placed on monitor.  Diagnostics were ordered.    I did independently interpret EKG done today at 2023    Sinus tachycardia with a rate of 110 bpm.  Intervals and axis are normal.  No significant ST elevation or depression.  No pathological Q waves.  There is biphasic T wave noted in anterior lateral leads, however there is no previous for comparison.  Nonspecific.    I did discuss the case with poison control and they state given what she has taken in the timeline that unless something gets worse she would likely be medically cleared within the next couple of hours as her medications would have already peaked by that time.      Laboratory studies returned largely unremarkable and she has been stable here.  She is currently medically clear.  This was a self endorsed active suicide attempt and she will need behavioral health placement.  She is voluntary but would be holdable.  She will remain in the emergency department until an appropriate psychiatric disposition can be achieved    FINAL IMPRESSION        Final diagnoses:   Suicidal ideation   Overdose,  intentional self-harm, initial encounter (H)             Jorge Luis Martinez MD  06/05/21 [id number]

## 2021-06-26 NOTE — PROGRESS NOTES
S: DEC called at 2214 to place a 20 y/o female from the [facility] ED for inpatient mental health treatment.    B: The patient presents today after attempting to end her life by drug overdose.  She reports she is having problems at home and was 'triggered' today.  She took 21 Zoloft and 11 Hydroxyzine with 2 beers at ~1400 today. After taking these medications, she reports sleeping off and on throughout the afternoon.  She also vomited, had a headache, and currently feels weak and foggy.  The patient reports she takes Zoloft normally.  The patient denies any other medicinal problems. Poison control was called and all recommendations were followed. Utox and COVID tests are pending. Medically cleared.     A: Voluntary.    R: On call paged at [phone] to review for placement on [facility]. On call approved admission at 2224. Unit notified at 4004. ED notified at 0040.

## 2021-06-26 NOTE — ED PROVIDER NOTES
ED Behavioral Health Patient Transition of Care Note    Assuming care from:  Aleksandr Martinez MD    Brief history:  21 y.o. female who presents for evaluation of suicidal ideation and drug overdose. Patient took 21 Zoloft and 11 Hydroxyzine with 2 beers at ~1400 today in an attempt to commit suicide.    Any outstanding medical concerns:  No, medically stable for  admission    Has SW seen the patient:  yes    Is the patient on a hold:  Voluntary but holdable    Current plan for disposition:  SW attempting to find a bed    Safety concerns:  No, pt has been cooperative    Dietary restrictions:  None, pt can eat and drink ad roxi    Have daily medications been ordered:  no daily meds needed    Significant events during shift:    10:30 PM Patient signed out to me by my colleague, Dr. Martinez.   11:23 PM patient accepted by Dr. Collazo from psychiatry at the [facility].  Patient agreeable with plan for admission there and medically cleared already prior to signout.  Please control did call back and over labs and vital signs which they agree with medical clearance already and plan for admission to mental health.      1. Suicidal ideation    2. Overdose, intentional self-harm, initial encounter (H)               Lopez Gates MD  06/05/21 0538

## 2021-06-27 ENCOUNTER — HEALTH MAINTENANCE LETTER (OUTPATIENT)
Age: 21
End: 2021-06-27

## 2021-06-28 ENCOUNTER — HOSPITAL ENCOUNTER (OUTPATIENT)
Dept: BEHAVIORAL HEALTH | Facility: CLINIC | Age: 21
End: 2021-06-28
Attending: PSYCHIATRY & NEUROLOGY
Payer: COMMERCIAL

## 2021-06-28 PROCEDURE — G0177 OPPS/PHP; TRAIN & EDUC SERV: HCPCS | Mod: 95

## 2021-06-28 PROCEDURE — G0177 OPPS/PHP; TRAIN & EDUC SERV: HCPCS | Mod: 95 | Performed by: OCCUPATIONAL THERAPIST

## 2021-06-28 PROCEDURE — 90853 GROUP PSYCHOTHERAPY: CPT | Mod: 95 | Performed by: COUNSELOR

## 2021-06-28 NOTE — GROUP NOTE
Psychoeducation Group Note    PATIENT'S NAME: Robert Steele  MRN:   [id number]  :   2000  ACCT. NUMBER: [id number]  DATE OF SERVICE: 21  START TIME: 10:00 AM  END TIME: 10:50 AM  FACILITATOR: Maile Andrews OTR/L  TOPIC: MH Life Skills Group: Lifestyle Balance and Structure  [facility] Adult Dual Diagnosis Day Treatment  TRACK: 1    NUMBER OF PARTICIPANTS: 7                                      Service Modality:  Video Visit     Telemedicine Visit: The patient's condition can be safely assessed and treated via synchronous audio and visual telemedicine encounter.      Reason for Telemedicine Visit: Services only offered telehealth    Originating Site (Patient Location): Patient's home    Distant Site (Provider Location): [facility] Outpatient Setting[place] [facility] [place]    Consent:  The patient/guardian has verbally consented to: the potential risks and benefits of telemedicine (video visit) versus in person care; bill my insurance or make self-payment for services provided; and responsibility for payment of non-covered services.     Patient would like the video invitation sent by:  My Chart    Mode of Communication:  Video Conference via Medical Zoom    As the provider I attest to compliance with applicable laws and regulations related to telemedicine.         Summary of Group / Topics Discussed:  Lifestyle Balance and Strucure:  Goal-setting & integration: Patients were introduced to the process and benefits of setting realistic, timely, and achievable goals to help support their ability to follow through with meaningful personal, health, recovery and treatment goals that they would like to achieve to improve overall functioning.  Patients were also taught and then practiced techniques to manage a variety of obstacles to achieve their goals and how to break goals into manageable pieces.  Patients also reported on follow through of goals.     Patient Session Goals /  Objectives:    Identified and wrote meaningful goals to engage in meaningful activities of daily living     Identified and problem solved barriers to achieving goals     Identified plan to support follow through on goals and reflection on progress made          Patient Participation / Response:  Fully participated with the group by sharing personal reflections / insights and openly received / provided feedback with other participants.    Patient presentation: active in group sharing goals for the week; reported she had better energy today as she slept better; working on time management, Verbalized understanding of content and Patient would benefit from additional opportunities to practice the content to be able to generalize it to their everyday life with increased intentionality, consistency, and efficacy in support of their psychiatric recovery    Treatment Plan:  Patient has a current master individualized treatment plan.  See Epic treatment plan for more information.    Maile Andrews, OTR/L

## 2021-06-28 NOTE — GROUP NOTE
Psychoeducation Group Note    PATIENT'S NAME: Robert Steele  MRN:   [id number]  :   2000  ACCT. NUMBER: [id number]  DATE OF SERVICE: 21  START TIME: 11:00 AM  END TIME: 11:50 AM  FACILITATOR: Clementina Chu RN  TOPIC: HE RN Group: Brain Health                                    Service Modality:  Video Visit     Telemedicine Visit: The patient's condition can be safely assessed and treated via synchronous audio and visual telemedicine encounter.      Reason for Telemedicine Visit:  covid19    Originating Site (Patient Location): Patient's home    Distant Site (Provider Location): Provider Remote Setting- Home Office    Consent:  The patient/guardian has verbally consented to: the potential risks and benefits of telemedicine (video visit) versus in person care; bill my insurance or make self-payment for services provided; and responsibility for payment of non-covered services.     Patient would like the video invitation sent by:  My Chart    Mode of Communication:  Video Conference via Medical Zoom    As the provider I attest to compliance with applicable laws and regulations related to telemedicine.          [facility] Adult Dual Diagnosis Day Treatment  TRACK: 1    NUMBER OF PARTICIPANTS: 7    Summary of Group / Topics Discussed:  Brain Health:  Pathophysiology of Drugs on the Brain, part 1: Patients were educated on basic neuroscience and the normal role and function of the limbic system, diencephalon, cerebral cortex, and the anatomy of neurons and neurotransmitters. The natural reward circuit was discussed and the effect of drugs, how they work, and how use leads to addiction was also examined. Various pharmacologic, psychotherapeutic, and complementary treatment options were reviewed.     Patient Session Goals / Objectives:  ? Described basic neuroscience with emphasis on the reward system   ? Explained how drugs work in the brain and how chemical/substance use can lead to  addiction  ? Identified and described pharmacologic, psychotherapeutic, and complementary treatment options      Patient Participation / Response:  Moderately participated, sharing some personal reflections / insights and adequately adequately received / provided feedback with other participants.    Verbalized understanding of brain health topic    Treatment Plan:  Patient has a current master individualized treatment plan.  See Epic treatment plan for more information.    Clementina Chu RN

## 2021-06-28 NOTE — GROUP NOTE
"Process Group Note    PATIENT'S NAME: Robert Steele  MRN:   [id number]  :   2000  ACCT. NUMBER: [id number]  DATE OF SERVICE: 21  START TIME:  9:00 AM  END TIME:  9:50 AM  FACILITATOR: Nichelle Castaneda [facility]  TOPIC:  Process Group    Diagnoses:  296.33 (F33.2) Major Depressive Disorder, Recurrent Episode, Severe With anxious distress  300.02 (F41.1) Generalized Anxiety Disorder.  4. Other Diagnoses that is relevant to services:  Substance-Related & Addictive Disorders Alcohol Use Disorder   303.90 (F10.20) Severe  304.30 (F12.20) Cannabis Use Disorder Severe       [facility] Adult Dual Diagnosis Day Treatment  TRACK: 1    Telemedicine Visit: The patient's condition can be safely assessed and treated via synchronous audio and visual telemedicine encounter.      Reason for Telemedicine Visit: Services only offered telehealth    Originating Site (Patient Location): Patient's home    Distant Site (Provider Location): Provider Remote Setting- Home Office    Consent:  The patient/guardian has verbally consented to: the potential risks and benefits of telemedicine (video visit) versus in person care; bill my insurance or make self-payment for services provided; and responsibility for payment of non-covered services.     Mode of Communication:  Video Conference via [email or web address]    As the provider I attest to compliance with applicable laws and regulations related to telemedicine.      NUMBER OF PARTICIPANTS: 5          Data:    Session content: At the start of this group, patients were invited to check in by identifying themselves, describing their current emotional status, and identifying issues to address in this group.   Area(s) of treatment focus addressed in this session included Symptom Management, Personal Safety and Abstinence/Relapse Prevention.    Ward reported feeling \"energized\" today.  Her goal for the day is to do some chores before going to work.      Therapeutic " Interventions/Treatment Strategies:  Psychotherapist offered support, feedback and validation.    Assessment:    Patient response:   Patient responded to session by accepting feedback, giving feedback and listening    Possible barriers to participation / learning include: and no barriers identified    Health Issues:   None reported       Substance Use Review:   Substance Use: Substance use has decreased    Mental Status/Behavioral Observations  Appearance:   Appropriate   Eye Contact:   Good   Psychomotor Behavior: Normal   Attitude:   Cooperative   Orientation:   All  Speech   Rate / Production: Normal    Volume:  Normal   Mood:    Depressed   Affect:    Appropriate   Thought Content:   Clear  Thought Form:  Coherent  Logical     Insight:    Good     Plan:     Safety Plan: No current safety concerns identified.  Recommended that patient call 911 or go to the local ED should there be a change in any of these risk factors.     Barriers to treatment: None identified    Patient Contracts (see media tab):  None    Substance Use: Not addressed in session     Continue or Discharge: Patient will continue in Adult Dual Disorder Program (DDP) as planned. Patient is likely to benefit from learning and using skills as they work toward the goals identified in their treatment plan.      Nichelle Castaneda, [facility]  June 28, 2021

## 2021-06-29 ENCOUNTER — HOSPITAL ENCOUNTER (OUTPATIENT)
Dept: BEHAVIORAL HEALTH | Facility: CLINIC | Age: 21
End: 2021-06-29
Attending: PSYCHIATRY & NEUROLOGY
Payer: COMMERCIAL

## 2021-06-29 PROCEDURE — 90853 GROUP PSYCHOTHERAPY: CPT | Mod: 95 | Performed by: COUNSELOR

## 2021-06-29 PROCEDURE — 90853 GROUP PSYCHOTHERAPY: CPT | Mod: GT | Performed by: PSYCHOLOGIST

## 2021-06-29 PROCEDURE — G0177 OPPS/PHP; TRAIN & EDUC SERV: HCPCS | Mod: GT

## 2021-06-29 NOTE — GROUP NOTE
Psychotherapy Group Note    PATIENT'S NAME: Robert Steele  MRN:   [id number]  :   2000  ACCT. NUMBER: [id number]  DATE OF SERVICE: 21  START TIME:  9:00 AM  END TIME:  9:50 AM  FACILITATOR: Aaron Millard LP  TOPIC:  EBP Group: Emotions Management  [facility] Adult Dual Diagnosis Day Treatment  TRACK: 1    NUMBER OF PARTICIPANTS: 4    Telemedicine Visit: The patient's condition can be safely assessed and treated via synchronous audio and visual telemedicine encounter.      Reason for Telemedicine Visit: Services only offered telehealth    Originating Site (Patient Location): Patient's home    Distant Site (Provider Location): Provider Remote Setting- Home Office    Consent:  The patient/guardian has verbally consented to: the potential risks and benefits of telemedicine (video visit) versus in person care; bill my insurance or make self-payment for services provided; and responsibility for payment of non-covered services.     Mode of Communication:  Video Conference via KeriCure    As the provider I attest to compliance with applicable laws and regulations related to telemedicine.      Summary of Group / Topics Discussed:  Emotions Management: Understanding Emotions: Patients discussed the purpose of emotions and function they serve in our lives.  Reviewed core emotions, why they happen (triggers), and how they occur. The group assisted one anothers' understanding that: emotional experiences are important; difficult emotions have a place in our lives; and the differences between various emotions.    Patient Session Goals / Objectives:    Demonstrate understanding of types various emotions    Identify and discuss specific emotions and when they occur; understand triggers    Discuss barriers to emotional regulation      Patient Participation / Response:  Fully participated with the group by sharing personal reflections / insights and openly received / provided feedback with  other participants.    Demonstrated understanding of topics discussed through group discussion and participation    Treatment Plan:  Patient has a current master individualized treatment plan.  See Epic treatment plan for more information.    Aaron Millard, LP

## 2021-06-29 NOTE — GROUP NOTE
Psychoeducation Group Note    PATIENT'S NAME: Robert Steele  MRN:   [id number]  :   2000  ACCT. NUMBER: [id number]  DATE OF SERVICE: 21  START TIME: 11:00 AM  END TIME: 11:50 AM  FACILITATOR: Clementina Chu RN  TOPIC: HE RN Group: Brain Health                                    Service Modality:  Video Visit     Telemedicine Visit: The patient's condition can be safely assessed and treated via synchronous audio and visual telemedicine encounter.      Reason for Telemedicine Visit:  covid19    Originating Site (Patient Location): Patient's home    Distant Site (Provider Location): Provider Remote Setting- Home Office    Consent:  The patient/guardian has verbally consented to: the potential risks and benefits of telemedicine (video visit) versus in person care; bill my insurance or make self-payment for services provided; and responsibility for payment of non-covered services.     Patient would like the video invitation sent by:  My Chart    Mode of Communication:  Video Conference via Medical Zoom    As the provider I attest to compliance with applicable laws and regulations related to telemedicine.          [facility] Adult Dual Diagnosis Day Treatment  TRACK: 1    NUMBER OF PARTICIPANTS: 5    Summary of Group / Topics Discussed:  Brain Health:  Pathophysiology of Drugs on the Brain, pt 2: Patients were educated on basic neuroscience and the normal role and function of the limbic system, diencephalon, cerebral cortex, and the anatomy of neurons and neurotransmitters. The natural reward circuit was discussed and the effect of drugs, how they work, and how use leads to addiction was also examined. Various pharmacologic, psychotherapeutic, and complementary treatment options were reviewed.     Patient Session Goals / Objectives:  ? Described basic neuroscience with emphasis on the reward system   ? Explained how drugs work in the brain and how chemical/substance use can lead to  addiction  ? Identified and described pharmacologic, psychotherapeutic, and complementary treatment options      Patient Participation / Response:  Fully participated with the group by sharing personal reflections / insights and openly received / provided feedback with other participants.    Demonstrated understanding of topics discussed through group discussion and participation and Identified / Expressed personal readiness to practice skills    Treatment Plan:  Patient has a current master individualized treatment plan.  See Epic treatment plan for more information.    Clementina Chu RN

## 2021-06-29 NOTE — GROUP NOTE
"Process Group Note    PATIENT'S NAME: Robert Steele  MRN:   [id number]  :   2000  ACCT. NUMBER: [id number]  DATE OF SERVICE: 21  START TIME: 10:00 AM  END TIME: 10:50 AM  FACILITATOR: Nichelle Castaneda [facility]  TOPIC:  Process Group    Diagnoses:  296.33 (F33.2) Major Depressive Disorder, Recurrent Episode, Severe With anxious distress  300.02 (F41.1) Generalized Anxiety Disorder.  4. Other Diagnoses that is relevant to services:  Substance-Related & Addictive Disorders Alcohol Use Disorder   303.90 (F10.20) Severe  304.30 (F12.20) Cannabis Use Disorder Severe       [facility] Adult Dual Diagnosis Day Treatment  TRACK: 1    NUMBER OF PARTICIPANTS: 4    Telemedicine Visit: The patient's condition can be safely assessed and treated via synchronous audio and visual telemedicine encounter.      Reason for Telemedicine Visit: Services only offered telehealth    Originating Site (Patient Location): Patient's home    Distant Site (Provider Location): Provider Remote Setting- Home Office    Consent:  The patient/guardian has verbally consented to: the potential risks and benefits of telemedicine (video visit) versus in person care; bill my insurance or make self-payment for services provided; and responsibility for payment of non-covered services.     Mode of Communication:  Video Conference via Immy    As the provider I attest to compliance with applicable laws and regulations related to telemedicine.            Data:    Session content: At the start of this group, patients were invited to check in by identifying themselves, describing their current emotional status, and identifying issues to address in this group.   Area(s) of treatment focus addressed in this session included Symptom Management, Personal Safety and Abstinence/Relapse Prevention.    Ward reported feeling \"irritated\" today with some things happening at work.  Her goal for the day is to write down a list of concerns and what " she wants to get across to her coworkers so she can stay on track with the conversation.  Group members and client discussed strategies for how to be assertive and make sure she is standing up for herself and her client.      Therapeutic Interventions/Treatment Strategies:  Psychotherapist offered support, feedback and validation and reinforced use of skills. Treatment modalities used include Motivational Interviewing, Cognitive Behavioral Therapy and Dialectical Behavioral Therapy. Interventions include Relationship Skills: Assisted patients in implementing more effective communication skills in their relationships.    Assessment:    Patient response:   Patient responded to session by accepting feedback, giving feedback and listening    Possible barriers to participation / learning include: and no barriers identified    Health Issues:   None reported       Substance Use Review:   Substance Use: Substance use has decreased    Mental Status/Behavioral Observations  Appearance:   Appropriate   Eye Contact:   Good   Psychomotor Behavior: Normal   Attitude:   Cooperative   Orientation:   All  Speech   Rate / Production: Normal    Volume:  Normal   Mood:    Depressed  Irritable   Affect:    Appropriate   Thought Content:   Clear  Thought Form:  Coherent  Logical     Insight:    Good     Plan:     Safety Plan: No current safety concerns identified.  Recommended that patient call 911 or go to the local ED should there be a change in any of these risk factors.     Barriers to treatment: None identified    Patient Contracts (see media tab):  None    Substance Use: Provided encouragement towards sobriety    Provided support and affirmation for steps taken towards sobriety      Continue or Discharge: Patient will continue in Adult Dual Disorder Program (DDP) as planned. Patient is likely to benefit from learning and using skills as they work toward the goals identified in their treatment plan.      Nichelle Castaneda, [facility]  Angely  29, 2021

## 2021-06-30 ENCOUNTER — HOSPITAL ENCOUNTER (OUTPATIENT)
Dept: BEHAVIORAL HEALTH | Facility: CLINIC | Age: 21
End: 2021-06-30
Attending: PSYCHIATRY & NEUROLOGY
Payer: COMMERCIAL

## 2021-06-30 PROCEDURE — G0177 OPPS/PHP; TRAIN & EDUC SERV: HCPCS | Mod: 95

## 2021-06-30 PROCEDURE — 90853 GROUP PSYCHOTHERAPY: CPT | Mod: 95 | Performed by: COUNSELOR

## 2021-06-30 PROCEDURE — 90853 GROUP PSYCHOTHERAPY: CPT | Mod: GT | Performed by: PSYCHOLOGIST

## 2021-06-30 NOTE — GROUP NOTE
Psychotherapy Group Note    PATIENT'S NAME: Robert Steele  MRN:   [id number]  :   2000  ACCT. NUMBER: [id number]  DATE OF SERVICE: 21  START TIME: 10:00 AM  END TIME: 10:50 AM  FACILITATOR: Aaron Millard LP  TOPIC:  EBP Group: Emotions Management  [facility] Adult Dual Diagnosis Day Treatment  TRACK: 1    NUMBER OF PARTICIPANTS: 8    Telemedicine Visit: The patient's condition can be safely assessed and treated via synchronous audio and visual telemedicine encounter.      Reason for Telemedicine Visit: Services only offered telehealth    Originating Site (Patient Location): Patient's home    Distant Site (Provider Location): Provider Remote Setting- Home Office    Consent:  The patient/guardian has verbally consented to: the potential risks and benefits of telemedicine (video visit) versus in person care; bill my insurance or make self-payment for services provided; and responsibility for payment of non-covered services.     Mode of Communication:  Video Conference via LiveU    As the provider I attest to compliance with applicable laws and regulations related to telemedicine.      Summary of Group / Topics Discussed:  Emotions Management: Model of Emotions: Patients were introduced to the cyclical model of emotions.  Explored emotions are shaped by different events and one s interpretation of events.  Group discussed how emotions begin with an event, followed by one s interpretation, followed by associated feelings.  Discussion included a review of personal urges and actions that can/do follow an emotional experience in the patient s life, and the end results.    Patient Session Goals / Objectives:    Demonstrate understanding of types various emotions.    Identify and discuss specific emotions and when they occur; understand triggers.    Identify individual emotions and physical sensations that accompany them.    Discuss urges and actions, and how to influence the  intensity of emotional reactions and disrupt the cycle.      Discuss barriers to emotional regulation.    Choose 1-2 strategies to assist with emotional response to potentially distressing situations.      Patient Participation / Response:  Fully participated with the group by sharing personal reflections / insights and openly received / provided feedback with other participants.    Demonstrated understanding of topics discussed through group discussion and participation    Treatment Plan:  Patient has a current master individualized treatment plan.  See Epic treatment plan for more information.    Aaron Millard, LP

## 2021-06-30 NOTE — GROUP NOTE
Psychoeducation Group Note    PATIENT'S NAME: Robert Steele  MRN:   [id number]  :   2000  ACCT. NUMBER: [id number]  DATE OF SERVICE: 21  START TIME: 11:00 AM  END TIME: 11:50 AM  FACILITATOR: Clementina Chu RN  TOPIC: HE RN Group: Brain Health                                    Service Modality:  Video Visit     Telemedicine Visit: The patient's condition can be safely assessed and treated via synchronous audio and visual telemedicine encounter.      Reason for Telemedicine Visit:  covid19    Originating Site (Patient Location): Patient's home    Distant Site (Provider Location): Provider Remote Setting- Home Office    Consent:  The patient/guardian has verbally consented to: the potential risks and benefits of telemedicine (video visit) versus in person care; bill my insurance or make self-payment for services provided; and responsibility for payment of non-covered services.     Patient would like the video invitation sent by:  My Chart    Mode of Communication:  Video Conference via Medical Zoom    As the provider I attest to compliance with applicable laws and regulations related to telemedicine.          [facility] Adult Dual Diagnosis Day Treatment  TRACK: 1    NUMBER OF PARTICIPANTS: 7    Summary of Group / Topics Discussed:  Brain Health:  Pathophysiology of Drugs on the Brain, part 3: Patients were educated on basic neuroscience and the normal role and function of the limbic system, diencephalon, cerebral cortex, and the anatomy of neurons and neurotransmitters. The natural reward circuit was discussed and the effect of drugs, how they work, and how use leads to addiction was also examined. Various pharmacologic, psychotherapeutic, and complementary treatment options were reviewed.     Patient Session Goals / Objectives:  ? Described basic neuroscience with emphasis on the reward system   ? Explained how drugs work in the brain and how chemical/substance use can lead to  addiction  ? Identified and described pharmacologic, psychotherapeutic, and complementary treatment options      Patient Participation / Response:  Fully participated with the group by sharing personal reflections / insights and openly received / provided feedback with other participants.    Demonstrated understanding of topics discussed through group discussion and participation    Treatment Plan:  Patient has a current master individualized treatment plan.  See Epic treatment plan for more information.    Clementina Chu RN

## 2021-06-30 NOTE — GROUP NOTE
"Process Group Note    PATIENT'S NAME: Robert Steele  MRN:   [id number]  :   2000  ACCT. NUMBER: [id number]  DATE OF SERVICE: 21  START TIME:  9:00 AM  END TIME:  9:50 AM  FACILITATOR: Nichelle Castaneda [facility]  TOPIC:  Process Group    Diagnoses:  296.33 (F33.2) Major Depressive Disorder, Recurrent Episode, Severe With anxious distress  300.02 (F41.1) Generalized Anxiety Disorder.  4. Other Diagnoses that is relevant to services:  Substance-Related & Addictive Disorders Alcohol Use Disorder   303.90 (F10.20) Severe  304.30 (F12.20) Cannabis Use Disorder Severe       [facility] Adult Dual Diagnosis Day Treatment  TRACK: 1    NUMBER OF PARTICIPANTS: 6    Telemedicine Visit: The patient's condition can be safely assessed and treated via synchronous audio and visual telemedicine encounter.      Reason for Telemedicine Visit: Services only offered telehealth    Originating Site (Patient Location): Patient's home    Distant Site (Provider Location): Provider Remote Setting- Home Office    Consent:  The patient/guardian has verbally consented to: the potential risks and benefits of telemedicine (video visit) versus in person care; bill my insurance or make self-payment for services provided; and responsibility for payment of non-covered services.     Mode of Communication:  Video Conference via Socialbomb    As the provider I attest to compliance with applicable laws and regulations related to telemedicine.            Data:    Session content: At the start of this group, patients were invited to check in by identifying themselves, describing their current emotional status, and identifying issues to address in this group.   Area(s) of treatment focus addressed in this session included Symptom Management, Personal Safety and Abstinence/Relapse Prevention.    Ward reported feeling \"good\" today.  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  Client " declined additional process time but contributed to group discussion and provided feedback and support to peers.      Therapeutic Interventions/Treatment Strategies:  Psychotherapist offered support, feedback and validation.    Assessment:    Patient response:   Patient responded to session by accepting feedback, giving feedback and listening    Possible barriers to participation / learning include: and no barriers identified    Health Issues:   None reported       Substance Use Review:   Substance Use: Substance use has decreased    Mental Status/Behavioral Observations  Appearance:   Appropriate   Eye Contact:   Good   Psychomotor Behavior: Normal   Attitude:   Cooperative   Orientation:   All  Speech   Rate / Production: Normal    Volume:  Normal   Mood:    Depressed   Affect:    Appropriate   Thought Content:   Clear  Thought Form:  Coherent  Logical     Insight:    Good     Plan:     Safety Plan: No current safety concerns identified.  Recommended that patient call 911 or go to the local ED should there be a change in any of these risk factors.     Barriers to treatment: None identified    Patient Contracts (see media tab):  None    Substance Use: Provided encouragement towards sobriety    Provided support and affirmation for steps taken towards sobriety      Continue or Discharge: Patient will continue in Adult Dual Disorder Program (DDP) as planned. Patient is likely to benefit from learning and using skills as they work toward the goals identified in their treatment plan.      Nichelle Castaneda, [facility]  June 30, 2021

## 2021-07-02 ENCOUNTER — HOSPITAL ENCOUNTER (OUTPATIENT)
Dept: BEHAVIORAL HEALTH | Facility: CLINIC | Age: 21
End: 2021-07-02
Attending: PSYCHIATRY & NEUROLOGY
Payer: COMMERCIAL

## 2021-07-02 PROCEDURE — 90853 GROUP PSYCHOTHERAPY: CPT | Mod: GT | Performed by: COUNSELOR

## 2021-07-02 PROCEDURE — G0177 OPPS/PHP; TRAIN & EDUC SERV: HCPCS | Mod: GT

## 2021-07-02 NOTE — GROUP NOTE
Psychoeducation Group Note    PATIENT'S NAME: Robert Steele  MRN:   [id number]  :   2000  ACCT. NUMBER: [id number]  DATE OF SERVICE: 21  START TIME:  9:00 AM  END TIME:  9:50 AM  FACILITATOR: Clementina Chu RN  TOPIC: HE RN Group: Health Maintenance                                    Service Modality:  Video Visit     Telemedicine Visit: The patient's condition can be safely assessed and treated via synchronous audio and visual telemedicine encounter.      Reason for Telemedicine Visit:  covid19    Originating Site (Patient Location): Patient's home    Distant Site (Provider Location): Provider Remote Setting- Home Office    Consent:  The patient/guardian has verbally consented to: the potential risks and benefits of telemedicine (video visit) versus in person care; bill my insurance or make self-payment for services provided; and responsibility for payment of non-covered services.     Patient would like the video invitation sent by:  My Chart    Mode of Communication:  Video Conference via Medical Zoom    As the provider I attest to compliance with applicable laws and regulations related to telemedicine.          [facility] Adult Dual Diagnosis Day Treatment  TRACK: 1    NUMBER OF PARTICIPANTS: 7    Summary of Group / Topics Discussed:  Health Maintenance: Weekend planning: Patients were given time to complete a weekend plan of what they will do to promote wellness and sobriety over the weekend when they do not have the structure of group. Patients were encouraged to review progress on their treatment goals and were challenged to identify ways to work toward meeting them. Patients identified and discussed foreseeable barriers to success over the weekend and then developed a plan to overcome them. Patients reviewed their distress coping skills and social support network and discussed this with the group.       Patient Session Goals / Objectives:    ?    Identified activities to engage in  that promote balance in wellness  ?    Distinguished possible barriers to success over the weekend and created a plan to overcome them  ?    Listed distress coping skills and identified social support network to utilize if in crisis during the weekend          Patient Participation / Response:  Fully participated with the group by sharing personal reflections / insights and openly received / provided feedback with other participants.    Demonstrated understanding of topics discussed through group discussion and participation and Identified / Expressed personal readiness to practice skills    Treatment Plan:  Patient has a current master individualized treatment plan.  See Epic treatment plan for more information.    Clementina Chu RN

## 2021-07-02 NOTE — GROUP NOTE
"Process Group Note    PATIENT'S NAME: Robert Steele  MRN:   [id number]  :   2000  ACCT. NUMBER: [id number]  DATE OF SERVICE: 21  START TIME: 10:00 AM  END TIME: 10:50 AM  FACILITATOR: Nichelle Castaneda [facility]  TOPIC:  Process Group    Diagnoses:  296.33 (F33.2) Major Depressive Disorder, Recurrent Episode, Severe With anxious distress  300.02 (F41.1) Generalized Anxiety Disorder.  4. Other Diagnoses that is relevant to services:  Substance-Related & Addictive Disorders Alcohol Use Disorder   303.90 (F10.20) Severe  304.30 (F12.20) Cannabis Use Disorder Severe       [facility] Adult Dual Diagnosis Day Treatment  TRACK: 1    NUMBER OF PARTICIPANTS: 6    Telemedicine Visit: The patient's condition can be safely assessed and treated via synchronous audio and visual telemedicine encounter.      Reason for Telemedicine Visit: Services only offered telehealth    Originating Site (Patient Location): Patient's home    Distant Site (Provider Location): Provider Remote Setting- Home Office    Consent:  The patient/guardian has verbally consented to: the potential risks and benefits of telemedicine (video visit) versus in person care; bill my insurance or make self-payment for services provided; and responsibility for payment of non-covered services.     Mode of Communication:  Video Conference via FuelMyBlog    As the provider I attest to compliance with applicable laws and regulations related to telemedicine.            Data:    Session content: At the start of this group, patients were invited to check in by identifying themselves, describing their current emotional status, and identifying issues to address in this group.   Area(s) of treatment focus addressed in this session included Symptom Management, Personal Safety and Abstinence/Relapse Prevention.    Ward reported feeling \"good\" today.  Her goal for the day is to get out of the house.  Client declined additional process time but contributed to " group discussion and provided feedback and support to peers.      Therapeutic Interventions/Treatment Strategies:  Psychotherapist offered support, feedback and validation and reinforced use of skills.    Assessment:    Patient response:   Patient responded to session by accepting feedback, giving feedback and listening    Possible barriers to participation / learning include: and no barriers identified    Health Issues:   None reported       Substance Use Review:   Substance Use: Substance use has decreased    Mental Status/Behavioral Observations  Appearance:   Appropriate   Eye Contact:   Good   Psychomotor Behavior: Normal   Attitude:   Cooperative   Orientation:   All  Speech   Rate / Production: Normal    Volume:  Normal   Mood:    Depressed   Affect:    Appropriate   Thought Content:   Clear  Thought Form:  Coherent  Logical     Insight:    Good     Plan:     Safety Plan: No current safety concerns identified.  Recommended that patient call 911 or go to the local ED should there be a change in any of these risk factors.     Barriers to treatment: None identified    Patient Contracts (see media tab):  None    Substance Use: Provided encouragement towards sobriety    Provided support and affirmation for steps taken towards sobriety      Continue or Discharge: Patient will continue in Adult Dual Disorder Program (DDP) as planned. Patient is likely to benefit from learning and using skills as they work toward the goals identified in their treatment plan.      Nichelle Castaneda, [facility]  July 2, 2021

## 2021-07-04 NOTE — CONSULTS
Consults by Jeanine Mehta LICSW at 6/5/2021 10:35 PM     Author: Jeanine Mehta LICSW Service: Behavioral Author Type: Licensed Social Worker    Filed: 6/5/2021 10:53 PM Date of Service: 6/5/2021 10:35 PM Status: Signed    : Jeanine Mehta LICSW (Licensed Social Worker)     Consult Orders    1. Consult to Care Management / Social Work - Crisis Consult [[id number]] ordered by Jorge Luis Martinez MD at 06/05/21 2119                Crisis Social Work Assessment         DATE OF SERVICE      6/5/2021    Pt consented to video consult.   Order received: 2119  Consult started:   2130  Patients Originating Emergency Department:        PRESENTING PROBLEM / PERTINENT HISTORY AND COLLATERAL     Robert Steele is a 21 y.o. female being seen by Crisis Social Work through tele-medicine video consult. Pt presents to the ED following suicide attempt by overdose. Pt reports having ingested 33 pills in an attempt to die - zoloft, hyzroxyzine and 2 beers this evening. She is bummed to be alive at time of assessment. Pt identifies multiple stressors that boiled over this evening. Pt names the relationship with her mother to be strained, noting lack of support and understanding from her. Pt also states that MH is looked down upon in the  community and therefore she does not trust opening up to others. She has internalized her struggles for the majority of her life she reports. No hx of psychiatric admits. Brief hx of psychiatry years ago, though none current. No current psychotherapy. Pt of voluntary status, though holdable.     Current Symptoms:    Depression Symptoms: Feelings of helplessness, Crying, Sad, depressed mood, Feelings of hopelessness, Feelings of worthlessness, Thoughts of suicide/death    Manic Symptoms: No problems reported or observed    Psychosis Symptoms:   No     Anxiety Symptoms: No problems reported or observed           SUBSTANCE USE HISTORY:     Is there a history of, or current substance  use? Beer, daily  Substance name(s):beer    frequency: daily   Quantity:3 beers daily  Duration:  last use: today    Previous Treatment: No  Drug screen/BAL/Breathalyzer completed (date/results):   no medical necessity determined for testing by attending doctor         PSYCHIATRIC HISTORY AND CURRENT CARE:     Is there a history of mental health concerns?  Brief hx of psychiatry. No current MH tx.  History of Commitment: None reported by patient   History of Psychiatric Hospitalizations (dates):  No  History of PHP/Day Treatment/Outpatient Group Therapy (dates): None reported by patient   History of ECT (dates): None reported by patient   Current psychotropic medications:  patient reports no current prescribed medications    Psychiatrist:  No  Therapist:  No  :  No  ARMHS:  No  ACT Team:  No         MEDICAL HISTORY AND CURRENT MEDICAL CARE:     Hx of falls (per patient report) : No  Hx of seizure (per patient report) : No  Health Problems/Concerns: None reported by patient   Recent Changes in medications:   not currently prescribed medications  Medication Compliant (If not, is the patient off all or csome of medications; how long without;issues effecting compliance):not currently prescribed medications  Providers: No care team member to display          FAMILY HISTORY:     MI/CD Family History:  None reported by patient          SOCIAL HISTORY:     Living Situation:   Living Arrangements: Parent  Type of Residence: Private residence               Marital Status: single  Children: No           Abuse History:  None reported by patient   Shinto Beliefs:   unk  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     Community/Family Significant  Supports/Legal Guardian/POA:  Support System:Pt states a lack of support system.    Patient identified Strengths and Effective Coping Skills: (What is going well?)  Recently trained in at new job.    No emergency contact information on file.         MENTAL STATUS EXAM AND RISK ASSESSMENT:     Risk Assessment  Attempted suicide within last 30 days?: Yes         Attempting or threatening suicide/self harm?: Yes    Expressing suicidal/self harm thoughts without intent?: No    Do you have a plan?: Pt presents to ED following suicide attempt by overdose.    Hx of Suicidal Attempts?: Yes (comment)(Pt attempted suicide this evening by overdose.)    Recent Psychological Experiences: Housing Situation (comment), Loss (Comment)(multiple losses to include her father in recent years)    Current self Injurious behavior?: No    Hx of Self injurious behavior?: No    Current plan to harm another?: No         Do you have access to weapons?: No    Assaultive behavior: no problem                 Suicidal Risk Factors: Depressive symptoms, Isolated/lack of support  Active cutting or other SIB in department: No  Aggressive/assaultive behavior: No  Sexually inappropriate behavior or sexual vulnerability: No  Elopement risk: No    1A. Over the past 2 weeks, have you had thoughts of killing yourself?  Yes  1B. Have you ever attempted to kill yourself and, if yes, when did this last happen? Yes: Pt attempted suicide by overdose this evening.  2. Recent or current suicide plan? Yes  3. Recent or current intent to act on ideation? Yes  4. Lifetime psychiatric hospitalization? No  5. Pattern of excessive substance use?  No  6. Current irritability, agitation, or aggression? No    Total Score: 3/6   Critical Review items-     Current Attempt? Yes   Suicide Plan Present? Yes   Intent Present? Yes        Conclude risk level based on highest category endorsed in any row (mild/moderate/High)? HIGH    Mental Status Exam:  Appearance :  Appropriate  Motor Activity:  Within Normal Limits  Eye Contact: Engaged  Orientation Person: Yes  Orientation Situation: Yes  Orientation Place: Yes  Orientation Time: Yes     Memory Remote: Intact  Mood : Depressed  Affect: Appropriate  Thought Content: Suicidal  Fund of Knowledge: Appropriate  Thought Form: Organized  Attention Span: Adequate  Behavior Toward Examiner/Psychomotor Behavior : Cooperative, Appropriate to situation  Speech Content: Fluent  Speech Rate/Production: Normal  Speech Volume: Normal  Judgement: No evidence of impairment  Estimated Intelligence:  Average   Insight: Adequate        ASSESSMENT AND DIAGNOSIS     Clinical Summary (explains the provisional diagnostic hypothesis including your justification or reasons for applying the diagnosis:   Diagnosis: Unspecified Mood Disorder   Diagnostic Code(s): F39         PLAN     Level of Care Summary:  Recommended level of care: Inpatient Psychiatric Unit  Does the patient agree with the recommended level of care?    Yes  ED Physician consulted Name:  Michelle ANDRADE  ED Physician concurs with disposition:   Yes  Psychiatrist consulted?consult not required  Central Intake Notified (time/date): Yes, 10p, Jorge.      Final disposition:  Inpatient   If Inpatient, is patient admitted voluntary?  Yes    Patient aware of potential for transfer if there are not appropriate placement: yes    Follow-Up Plan:  Plan provided to the patient/parent/guardian: Yes  Resources provided?  N/A  Appointments pending or scheduled (provider, date, time location, etc.): N/A          SOURCES     Referral Information:  Referral Source: Patient request     Accompanied By/Relationship: none     Patient Identified Liabilities: Relationship with mother    Psychotherapy techniques and/or interventions used:  Assess dimensions of crisis, apply solution-focused therapy to address current crisis, identify additional supports and alternative coping skills, and establish a safety plan.      Rationale for intervention/technique: Used to improve patient safety, draw on patient's strengths, assess resources, and facilitate return to normal functioning.     Collateral Information/Sources:  Collateral Information: No  Collateral Sources: Medical Records  Details (name and phone number of collateral contact and information provided): none       SIGNATURE     Cumulative face to face time with patient in minutes: 40  Consult Duration: 60  (including time spent obtaining collateral, consulting with providers, next level of care planning and time spent with patient)  Type of Assessment: Crisis  Performed and Documented by: MING Colin   6/5/2021 10:35 PM

## 2021-07-05 ENCOUNTER — TELEPHONE (OUTPATIENT)
Dept: BEHAVIORAL HEALTH | Facility: CLINIC | Age: 21
End: 2021-07-05

## 2021-07-05 NOTE — TELEPHONE ENCOUNTER
"Writer called Ward to check in as she was not in group this morning.  She reported she had a \"rough weekend\" and was about to drive to an appointment and apologized for not calling in.  Writer informed her that due to her poor attendance, she will be placed on an attendance contract if she misses any more days this week.    Nichelle Castaneda, [facility] on 7/5/2021 at 10:02 AM    "

## 2021-07-06 ENCOUNTER — HOSPITAL ENCOUNTER (OUTPATIENT)
Dept: BEHAVIORAL HEALTH | Facility: CLINIC | Age: 21
End: 2021-07-06
Attending: PSYCHIATRY & NEUROLOGY
Payer: COMMERCIAL

## 2021-07-06 VITALS — HEIGHT: 69 IN | WEIGHT: 212 LBS | BODY MASS INDEX: 31.4 KG/M2

## 2021-07-06 PROCEDURE — 90853 GROUP PSYCHOTHERAPY: CPT | Mod: 95 | Performed by: COUNSELOR

## 2021-07-06 PROCEDURE — 90853 GROUP PSYCHOTHERAPY: CPT | Mod: GT | Performed by: PSYCHOLOGIST

## 2021-07-06 NOTE — ADDENDUM NOTE
Encounter addended by: Aaron Millard LP on: 7/6/2021 12:59 PM   Actions taken: Charge Capture section accepted

## 2021-07-06 NOTE — ADDENDUM NOTE
Encounter addended by: Nichelle Castaneda [facility] on: 7/6/2021 12:05 PM   Actions taken: Episode edited, Flowsheet accepted

## 2021-07-06 NOTE — GROUP NOTE
Process Group Note    PATIENT'S NAME: Robert Steele  MRN:   [id number]  :   2000  ACCT. NUMBER: [id number]  DATE OF SERVICE: 21  START TIME: 10:00 AM  END TIME: 10:50 AM  FACILITATOR: Nichelle Castaneda [facility]  TOPIC:  Process Group    Diagnoses:  296.33 (F33.2) Major Depressive Disorder, Recurrent Episode, Severe With anxious distress  300.02 (F41.1) Generalized Anxiety Disorder.  4. Other Diagnoses that is relevant to services:  Substance-Related & Addictive Disorders Alcohol Use Disorder   303.90 (F10.20) Severe  304.30 (F12.20) Cannabis Use Disorder Severe       [facility] Adult Dual Diagnosis Day Treatment  TRACK: 1    NUMBER OF PARTICIPANTS: 7                                      Service Modality:  Video Visit     Telemedicine Visit: The patient's condition can be safely assessed and treated via synchronous audio and visual telemedicine encounter.      Reason for Telemedicine Visit: Services only offered telehealth    Originating Site (Patient Location): Patient's home    Distant Site (Provider Location): Provider Remote Setting- Home Office    Consent:  The patient/guardian has verbally consented to: the potential risks and benefits of telemedicine (video visit) versus in person care; bill my insurance or make self-payment for services provided; and responsibility for payment of non-covered services.     Patient would like the video invitation sent by:  My Chart    Mode of Communication:  Video Conference via Medical Zoom    As the provider I attest to compliance with applicable laws and regulations related to telemedicine.                Data:    Session content: At the start of this group, patients were invited to check in by identifying themselves, describing their current emotional status, and identifying issues to address in this group.   Area(s) of treatment focus addressed in this session included Symptom Management, Personal Safety and Abstinence/Relapse Prevention.    Nejuan  reported feeling very disappointed in herself.  Her goal today is to not buy another pack of cigarettes.  She reported that she used marijuana on Friday, Saturday, and Sunday.  She shared that she was really beating herself up for a situation that happened at work.  She shared that she had been feeling really bad about herself and feeling like she wasn't contributing to group, which also led to some of the negative self-talk.      Therapeutic Interventions/Treatment Strategies:  Psychotherapist offered support, feedback and validation and reinforced use of skills. Treatment modalities used include Motivational Interviewing, Cognitive Behavioral Therapy and Dialectical Behavioral Therapy. Interventions include Relapse Prevention: Facilitated understanding the importance of awareness of factors that contribute to relapse , Assisted patient in identifying personal vulnerabilities, thoughts, emotions, and situations that may lead to relapse  and Coached on skills to manage factors that contribute to relapse.    Assessment:    Patient response:   Patient responded to session by accepting feedback, giving feedback and listening    Possible barriers to participation / learning include: and no barriers identified    Health Issues:   None reported       Substance Use Review:   Substance Use: cannabis .  and Last use: 7/5/21    Mental Status/Behavioral Observations  Appearance:   Appropriate   Eye Contact:   Good   Psychomotor Behavior: Normal   Attitude:   Cooperative   Orientation:   All  Speech   Rate / Production: Normal    Volume:  Normal   Mood:    Depressed   Affect:    Appropriate   Thought Content:   Clear  Thought Form:  Coherent  Logical     Insight:    Good     Plan:     Safety Plan: No current safety concerns identified.  Recommended that patient call 911 or go to the local ED should there be a change in any of these risk factors.     Barriers to treatment: None identified    Patient Contracts (see media tab):   None    Substance Use: Provided encouragement towards sobriety    Provided support and affirmation for steps taken towards sobriety      Continue or Discharge: Patient will continue in Adult Dual Disorder Program (DDP) as planned. Patient is likely to benefit from learning and using skills as they work toward the goals identified in their treatment plan.      Nichelle Castaneda, [facility]  July 6, 2021

## 2021-07-07 ENCOUNTER — HOSPITAL ENCOUNTER (OUTPATIENT)
Dept: BEHAVIORAL HEALTH | Facility: CLINIC | Age: 21
End: 2021-07-07
Attending: PSYCHIATRY & NEUROLOGY
Payer: COMMERCIAL

## 2021-07-07 PROCEDURE — 90853 GROUP PSYCHOTHERAPY: CPT | Mod: GT | Performed by: MARRIAGE & FAMILY THERAPIST

## 2021-07-07 PROCEDURE — G0177 OPPS/PHP; TRAIN & EDUC SERV: HCPCS | Mod: 95

## 2021-07-07 PROCEDURE — 90853 GROUP PSYCHOTHERAPY: CPT | Mod: 95 | Performed by: COUNSELOR

## 2021-07-07 NOTE — GROUP NOTE
Process Group Note    PATIENT'S NAME: Robert Steele  MRN:   [id number]  :   2000  ACCT. NUMBER: [id number]  DATE OF SERVICE: 21  START TIME:  9:00 AM  END TIME:  9:50 AM  FACILITATOR: Heidi Valero LMFT  TOPIC:  Process Group    Diagnoses:  296.33 (F33.2) Major Depressive Disorder, Recurrent Episode, Severe With anxious distress  300.02 (F41.1) Generalized Anxiety Disorder.  4. Other Diagnoses that is relevant to services:  Substance-Related & Addictive Disorders Alcohol Use Disorder   303.90 (F10.20) Severe  304.30 (F12.20) Cannabis Use Disorder Severe     [facility] Adult Dual Diagnosis Day Treatment  TRACK: 1    NUMBER OF PARTICIPANTS: 7                                      Service Modality:  Video Visit     Telemedicine Visit: The patient's condition can be safely assessed and treated via synchronous audio and visual telemedicine encounter.      Reason for Telemedicine Visit: Services only offered telehealth    Originating Site (Patient Location): Patient's home    Distant Site (Provider Location): Provider Remote Setting- Home Office    Consent:  The patient/guardian has verbally consented to: the potential risks and benefits of telemedicine (video visit) versus in person care; bill my insurance or make self-payment for services provided; and responsibility for payment of non-covered services.     Patient would like the video invitation sent by:  My Chart    Mode of Communication:  Video Conference via Medical Zoom    As the provider I attest to compliance with applicable laws and regulations related to telemedicine.               Data:    Session content: At the start of this group, patients were invited to check in by identifying themselves, describing their current emotional status, and identifying issues to address in this group.   Area(s) of treatment focus addressed in this session included Symptom Management, Personal Safety and Abstinence/Relapse PreventionEmanuel    annie nadeem  feeling rested, better than yesterday, more optimistic, her goal is to take things easy, go with the flow, remain mindful of her intentions, is using mindfulness to look at quotes, breathing today, denies barriers, denies safety concerns, denies chemical use, she flushed the marijuana she had, is taking rx, is grateful for the group support from her relapse, Client declined additional process time but contributed to group discussion and provided feedback and support to peers.      Therapeutic Interventions/Treatment Strategies:  Psychotherapist offered support, feedback and validation and reinforced use of skills. Treatment modalities used include Motivational Interviewing, Cognitive Behavioral Therapy and Dialectical Behavioral Therapy.    Assessment:    Patient response:   Patient responded to session by giving feedback, listening, being attentive and appearing alert    Possible barriers to participation / learning include: and no barriers identified    Health Issues:   None reported       Substance Use Review:   Substance Use: cannabis .     Mental Status/Behavioral Observations  Appearance:   Appropriate   Eye Contact:   Good   Psychomotor Behavior: Normal   Attitude:   Cooperative   Orientation:   All  Speech   Rate / Production: Normal    Volume:  Normal   Mood:    Euthymic  Affect:    Appropriate   Thought Content:   Clear and Safety denies any current safety concerns including suicidal ideation, self-harm, and homicidal ideation  Thought Form:  Coherent  Logical     Insight:    Good     Plan:     Safety Plan: No current safety concerns identified.  Recommended that patient call 911 or go to the local ED should there be a change in any of these risk factors.     Barriers to treatment: None identified    Patient Contracts (see media tab):  None    Substance Use: Provided encouragement towards sobriety    Provided support and affirmation for steps taken towards sobriety      Continue or Discharge: Patient will  continue in Adult Dual Disorder Program (DDP) as planned. Patient is likely to benefit from learning and using skills as they work toward the goals identified in their treatment plan.      Gasper Valero, LMFT  July 7, 2021

## 2021-07-07 NOTE — GROUP NOTE
Psychotherapy Group Note    PATIENT'S NAME: Robert Steele  MRN:   [id number]  :   2000  ACCT. NUMBER: [id number]  DATE OF SERVICE: 21  START TIME: 10:00 AM  END TIME: 10:50 AM  FACILITATOR: Caroline Saavedra LPCC  TOPIC:  EBP Group: Emotions Management  [facility] Adult Dual Diagnosis Day Treatment  TRACK: DDP1    NUMBER OF PARTICIPANTS: 7    Summary of Group / Topics Discussed:  Emotions Management: Guilt and Shame: Patients explored and shared personal experiences associated with feelings of guilt and shame.  Group explored how these feelings develop, what they mean to each individual, and how to increase acceptance and usefulness of these feelings.  Group members assisted one another to contextualize these concepts and promote healing.     Patient Session Goals / Objectives:    Discuss and review definitions and personal views/experiences with guilt and shame    Understand the differences between guilt and shame    Explore how feelings of guilt and shame impact functioning    Understand and practice strategies to manage difficult emotions and move towards healing    Understand and normalize difficult emotions through group discussion    Understand the utility of guilt and shame    Target  unwanted  emotions for change                                      Service Modality:  Video Visit     Telemedicine Visit: The patient's condition can be safely assessed and treated via synchronous audio and visual telemedicine encounter.      Reason for Telemedicine Visit: Services only offered telehealth and due to COVID-19    Originating Site (Patient Location): Patient's home    Distant Site (Provider Location): Provider Remote Setting- Home Office    Consent:  The patient/guardian has verbally consented to: the potential risks and benefits of telemedicine (video visit) versus in person care; bill my insurance or make self-payment for services provided; and responsibility for payment of non-covered  services.     Patient would like the video invitation sent by:  My Chart    Mode of Communication:  Video Conference via Medical Zoom    As the provider I attest to compliance with applicable laws and regulations related to telemedicine.            Patient Participation / Response:  Fully participated with the group by sharing personal reflections / insights and openly received / provided feedback with other participants.    Demonstrated understanding of topics discussed through group discussion and participation, Expressed understanding of the relevance / importance of emotions management skills at distressing times in life and Self-aware of experiences with difficult emotions, and strategies to employ to manage them    Treatment Plan:  Patient has a current master individualized treatment plan.  See Epic treatment plan for more information.    Caroline Saavedra, [facility]

## 2021-07-07 NOTE — GROUP NOTE
Psychoeducation Group Note    PATIENT'S NAME: Robert Steele  MRN:   [id number]  :   2000  ACCT. NUMBER: [id number]  DATE OF SERVICE: 21  START TIME: 11:00 AM  END TIME: 11:50 AM  FACILITATOR: lCementina Chu RN  TOPIC: HE RN Group: Mental Health Maintenance                                    Service Modality:  Video Visit     Telemedicine Visit: The patient's condition can be safely assessed and treated via synchronous audio and visual telemedicine encounter.      Reason for Telemedicine Visit:  covid19    Originating Site (Patient Location): Patient's home    Distant Site (Provider Location): Provider Remote Setting- Home Office    Consent:  The patient/guardian has verbally consented to: the potential risks and benefits of telemedicine (video visit) versus in person care; bill my insurance or make self-payment for services provided; and responsibility for payment of non-covered services.     Patient would like the video invitation sent by:  My Chart    Mode of Communication:  Video Conference via Medical Zoom    As the provider I attest to compliance with applicable laws and regulations related to telemedicine.          [facility] Adult Dual Diagnosis Day Treatment  TRACK: 1    NUMBER OF PARTICIPANTS: 6    Summary of Group / Topics Discussed:  Mental Health Maintenance:  Self - compassion; Patients discussed ways to develop self-compassion and the benefits of self compassion     Patient Session Goals / Objectives:  ? Patients practiced techniques of self compassion  ? Patients identified one technique they will use to cope with symptoms          Patient Participation / Response:  Fully participated with the group by sharing personal reflections / insights and openly received / provided feedback with other participants.    Demonstrated understanding of topics discussed through group discussion and participation and Identified / Expressed personal readiness to practice skills    Treatment  Plan:  Patient has a current master individualized treatment plan.  See Epic treatment plan for more information.    Clementina Chu RN

## 2021-07-08 ENCOUNTER — HOSPITAL ENCOUNTER (OUTPATIENT)
Dept: BEHAVIORAL HEALTH | Facility: CLINIC | Age: 21
End: 2021-07-08
Attending: PSYCHIATRY & NEUROLOGY
Payer: COMMERCIAL

## 2021-07-08 PROCEDURE — G0177 OPPS/PHP; TRAIN & EDUC SERV: HCPCS | Mod: GT | Performed by: OCCUPATIONAL THERAPIST

## 2021-07-08 PROCEDURE — 90853 GROUP PSYCHOTHERAPY: CPT | Mod: GT | Performed by: COUNSELOR

## 2021-07-08 PROCEDURE — 90853 GROUP PSYCHOTHERAPY: CPT | Mod: 95 | Performed by: COUNSELOR

## 2021-07-08 NOTE — GROUP NOTE
Psychotherapy Group Note    PATIENT'S NAME: Robert Steele  MRN:   [id number]  :   2000  ACCT. NUMBER: [id number]  DATE OF SERVICE: 21  START TIME: 11:00 AM  END TIME: 11:50 AM  FACILITATOR: Nichelle Castaneda LPCC  TOPIC:  EBP Group: DDP Relapse Prevention  [facility] Adult Dual Diagnosis Day Treatment  TRACK: 1    NUMBER OF PARTICIPANTS: 8                                      Service Modality:  Video Visit     Telemedicine Visit: The patient's condition can be safely assessed and treated via synchronous audio and visual telemedicine encounter.      Reason for Telemedicine Visit: Services only offered telehealth    Originating Site (Patient Location): Patient's home    Distant Site (Provider Location): Provider Remote Setting- Home Office    Consent:  The patient/guardian has verbally consented to: the potential risks and benefits of telemedicine (video visit) versus in person care; bill my insurance or make self-payment for services provided; and responsibility for payment of non-covered services.     Patient would like the video invitation sent by:  My Chart    Mode of Communication:  Video Conference via Medical Zoom    As the provider I attest to compliance with applicable laws and regulations related to telemedicine.         Summary of Group / Topics Discussed:  DDP Relapse Prevention: Observing and Describing Triggers for Substance Use: Patients explored in greater depth factors that contribute to a relapse including: emotions, thoughts, and situations that trigger substance use. Goal of topic is to assist patients in increased recognition of specific emotions, thoughts, and situations they may experience that increases risk. Patients were able to identify and share their specific emotions, thoughts, and situations with the group. Patients also learned  bridge burning  skill to assist in removing means of acting on substance use.    Patient Session Goals / Objectives:    Verbalized  understanding of the importance of awareness of triggers for substance use    Identified their own individual triggers    [place] effective coping skills in response to triggers for substance use including  bridge burning  skill      Patient Participation / Response:  Fully participated with the group by sharing personal reflections / insights and openly received / provided feedback with other participants.    Demonstrated understanding of topics discussed through group discussion and participation, Demonstrated understanding of utilizing relapse prevention skills to manage urges and maintain sobriety, Identified / Expressed personal readiness to utilize relapse prevention skills and Verbalized understanding of how relapse prevention skills can assist in maintaining sobriety    Treatment Plan:  Patient has a current master individualized treatment plan.  See Epic treatment plan for more information.    Nichelle Castaneda, [facility]

## 2021-07-08 NOTE — GROUP NOTE
Process Group Note    PATIENT'S NAME: Robert Steele  MRN:   [id number]  :   2000  ACCT. NUMBER: [id number]  DATE OF SERVICE: 21  START TIME: 10:00 AM  END TIME: 10:50 AM  FACILITATOR: Nichelle Castaneda [facility]  TOPIC:  Process Group    Diagnoses:  296.33 (F33.2) Major Depressive Disorder, Recurrent Episode, Severe With anxious distress  300.02 (F41.1) Generalized Anxiety Disorder.  4. Other Diagnoses that is relevant to services:  Substance-Related & Addictive Disorders Alcohol Use Disorder   303.90 (F10.20) Severe  304.30 (F12.20) Cannabis Use Disorder Severe       [facility] Adult Dual Diagnosis Day Treatment  TRACK: 1    NUMBER OF PARTICIPANTS: 9                                      Service Modality:  Video Visit     Telemedicine Visit: The patient's condition can be safely assessed and treated via synchronous audio and visual telemedicine encounter.      Reason for Telemedicine Visit: Services only offered telehealth    Originating Site (Patient Location): Patient's home    Distant Site (Provider Location): Provider Remote Setting- Home Office    Consent:  The patient/guardian has verbally consented to: the potential risks and benefits of telemedicine (video visit) versus in person care; bill my insurance or make self-payment for services provided; and responsibility for payment of non-covered services.     Patient would like the video invitation sent by:  My Chart    Mode of Communication:  Video Conference via Medical Zoom    As the provider I attest to compliance with applicable laws and regulations related to telemedicine.                Data:    Session content: At the start of this group, patients were invited to check in by identifying themselves, describing their current emotional status, and identifying issues to address in this group.   Area(s) of treatment focus addressed in this session included Symptom Management, Personal Safety and Abstinence/Relapse Prevention.    Nejuan  "reported feeling \"pretty good\" today.  Her goal for the day is to stay in the present moment by being mindful.  Client declined additional process time but contributed to group discussion and provided feedback and support to peers.      Therapeutic Interventions/Treatment Strategies:  Psychotherapist offered support, feedback and validation.    Assessment:    Patient response:   Patient responded to session by accepting feedback, giving feedback and listening    Possible barriers to participation / learning include: and no barriers identified    Health Issues:   None reported       Substance Use Review:   Substance Use: Substance use has decreased    Mental Status/Behavioral Observations  Appearance:   Appropriate   Eye Contact:   Good   Psychomotor Behavior: Normal   Attitude:   Cooperative   Orientation:   All  Speech   Rate / Production: Normal    Volume:  Normal   Mood:    Depressed   Affect:    Appropriate   Thought Content:   Clear  Thought Form:  Coherent  Logical     Insight:    Good     Plan:     Safety Plan: No current safety concerns identified.  Recommended that patient call 911 or go to the local ED should there be a change in any of these risk factors.     Barriers to treatment: None identified    Patient Contracts (see media tab):  None    Substance Use: Provided encouragement towards sobriety    Provided support and affirmation for steps taken towards sobriety      Continue or Discharge: Patient will continue in Adult Dual Disorder Program (DDP) as planned. Patient is likely to benefit from learning and using skills as they work toward the goals identified in their treatment plan.      Nichelle Castaneda, [facility]  July 8, 2021  "

## 2021-07-09 ENCOUNTER — HOSPITAL ENCOUNTER (OUTPATIENT)
Dept: BEHAVIORAL HEALTH | Facility: CLINIC | Age: 21
End: 2021-07-09
Attending: PSYCHIATRY & NEUROLOGY
Payer: COMMERCIAL

## 2021-07-09 PROCEDURE — 90853 GROUP PSYCHOTHERAPY: CPT | Mod: GT | Performed by: COUNSELOR

## 2021-07-09 PROCEDURE — G0177 OPPS/PHP; TRAIN & EDUC SERV: HCPCS | Mod: GT

## 2021-07-09 NOTE — GROUP NOTE
Psychoeducation Group Note    PATIENT'S NAME: Robert Steele  MRN:   [id number]  :   2000  ACCT. NUMBER: [id number]  DATE OF SERVICE: 21  START TIME: 11:00 AM  END TIME: 11:50 AM  FACILITATOR: Jerome Dave OTR/L  TOPIC: MH Life Skills Group: Resiliency Development                                    Service Modality:  Video Visit     Telemedicine Visit: The patient's condition can be safely assessed and treated via synchronous audio and visual telemedicine encounter.      Reason for Telemedicine Visit: Services only offered telehealth    Originating Site (Patient Location): Patient's home    Distant Site (Provider Location): Provider Remote Setting- Home Office    Consent:  The patient/guardian has verbally consented to: the potential risks and benefits of telemedicine (video visit) versus in person care; bill my insurance or make self-payment for services provided; and responsibility for payment of non-covered services.     Mode of Communication:  Video Conference via Medical Zoom    As the provider I attest to compliance with applicable laws and regulations related to telemedicine.       [facility] Adult Dual Diagnosis Day Treatment  TRACK: Group One    NUMBER OF PARTICIPANTS: 8    Summary of Group / Topics Discussed:  Resiliency Development:  Coping Skills: Personal Recovery Weekly Reflection: Patients were taught how to identify coping strategies and routines that they can adopt and use for management with focus on a balance between physical, emotional, social and spiritual strategies.    Patient Session Goals / Objectives:    Identified personal definitions of recovery for effectively managing both mental health and substance abuse/abuse symptoms     Improved awareness of the process of recovery and skills and strategies that support this       Established a plan for practice of these skills in their own environments    Practiced and reflected on how to generalize taught skills to  their everyday life        Patient Participation / Response:  Fully participated with the group by sharing personal reflections / insights and openly received / provided feedback with other participants.    Demonstrated understanding of content through handout/group discussion , Verbalized understanding of content and Patient would benefit from additional opportunities to practice the content to be able to generalize it to their everyday life with increased intentionality, consistency, and efficacy in support of their psychiatric recovery    Treatment Plan:  Patient has a current master individualized treatment plan.  See Epic treatment plan for more information.    Jerome Dave, OTR/L

## 2021-07-09 NOTE — GROUP NOTE
Process Group Note    PATIENT'S NAME: Robert Steele  MRN:   [id number]  :   2000  ACCT. NUMBER: [id number]  DATE OF SERVICE: 21  START TIME: 10:00 AM  END TIME: 10:50 AM  FACILITATOR: Nichelle Castaneda [facility]  TOPIC:  Process Group    Diagnoses:  296.33 (F33.2) Major Depressive Disorder, Recurrent Episode, Severe With anxious distress  300.02 (F41.1) Generalized Anxiety Disorder.  4. Other Diagnoses that is relevant to services:  Substance-Related & Addictive Disorders Alcohol Use Disorder   303.90 (F10.20) Severe  304.30 (F12.20) Cannabis Use Disorder Severe       [facility] Adult Dual Diagnosis Day Treatment  TRACK: 1    NUMBER OF PARTICIPANTS: 8                                      Service Modality:  Video Visit     Telemedicine Visit: The patient's condition can be safely assessed and treated via synchronous audio and visual telemedicine encounter.      Reason for Telemedicine Visit: Services only offered telehealth    Originating Site (Patient Location): Patient's home    Distant Site (Provider Location): Provider Remote Setting- Home Office    Consent:  The patient/guardian has verbally consented to: the potential risks and benefits of telemedicine (video visit) versus in person care; bill my insurance or make self-payment for services provided; and responsibility for payment of non-covered services.     Patient would like the video invitation sent by:  My Chart    Mode of Communication:  Video Conference via Medical Zoom    As the provider I attest to compliance with applicable laws and regulations related to telemedicine.                Data:    Session content: At the start of this group, patients were invited to check in by identifying themselves, describing their current emotional status, and identifying issues to address in this group.   Area(s) of treatment focus addressed in this session included Symptom Management, Personal Safety and Abstinence/Relapse Prevention.    Nejuan  "reported feeling \"good\" but \"nervous\" today.  Her goal for the day is to relax this weekend and prepare for the next week.  Client declined additional process time but contributed to group discussion and provided feedback and support to peers.      Therapeutic Interventions/Treatment Strategies:  Psychotherapist offered support, feedback and validation and reinforced use of skills.    Assessment:    Patient response:   Patient responded to session by accepting feedback, giving feedback and listening    Possible barriers to participation / learning include: and no barriers identified    Health Issues:   None reported       Substance Use Review:   Substance Use: Substance use has decreased    Mental Status/Behavioral Observations  Appearance:   Appropriate   Eye Contact:   Good   Psychomotor Behavior: Normal   Attitude:   Cooperative   Orientation:   All  Speech   Rate / Production: Normal    Volume:  Normal   Mood:    Anxious   Affect:    Appropriate   Thought Content:   Clear  Thought Form:  Coherent  Logical     Insight:    Good     Plan:     Safety Plan: No current safety concerns identified.  Recommended that patient call 911 or go to the local ED should there be a change in any of these risk factors.     Barriers to treatment: None identified    Patient Contracts (see media tab):  None    Substance Use: Provided encouragement towards sobriety    Provided support and affirmation for steps taken towards sobriety      Continue or Discharge: Patient will continue in Adult Day Treatment (ADT)  as planned. Patient is likely to benefit from learning and using skills as they work toward the goals identified in their treatment plan.      Nichelle Castaneda, [facility]  July 9, 2021  "

## 2021-07-09 NOTE — GROUP NOTE
Psychoeducation Group Note    PATIENT'S NAME: Robert Steele  MRN:   [id number]  :   2000  ACCT. NUMBER: [id number]  DATE OF SERVICE: 21  START TIME:  9:00 AM  END TIME:  9:50 AM  FACILITATOR: Clementina hCu RN  TOPIC: HE RN Group: Health Maintenance                                    Service Modality:  Video Visit     Telemedicine Visit: The patient's condition can be safely assessed and treated via synchronous audio and visual telemedicine encounter.      Reason for Telemedicine Visit:  covid19    Originating Site (Patient Location): Patient's home    Distant Site (Provider Location): Provider Remote Setting- Home Office    Consent:  The patient/guardian has verbally consented to: the potential risks and benefits of telemedicine (video visit) versus in person care; bill my insurance or make self-payment for services provided; and responsibility for payment of non-covered services.     Patient would like the video invitation sent by:  My Chart    Mode of Communication:  Video Conference via Medical Zoom    As the provider I attest to compliance with applicable laws and regulations related to telemedicine.          [facility] Adult Dual Diagnosis Day Treatment  TRACK: 1    NUMBER OF PARTICIPANTS: 8    Summary of Group / Topics Discussed:  Health Maintenance: Weekend planning: Patients were given time to complete a weekend plan of what they will do to promote wellness and sobriety over the weekend when they do not have the structure of group. Patients were encouraged to review progress on their treatment goals and were challenged to identify ways to work toward meeting them. Patients identified and discussed foreseeable barriers to success over the weekend and then developed a plan to overcome them. Patients reviewed their distress coping skills and social support network and discussed this with the group.       Patient Session Goals / Objectives:    ?    Identified activities to engage in  that promote balance in wellness  ?    Distinguished possible barriers to success over the weekend and created a plan to overcome them  ?    Listed distress coping skills and identified social support network to utilize if in crisis during the weekend          Patient Participation / Response:  Fully participated with the group by sharing personal reflections / insights and openly received / provided feedback with other participants.    Demonstrated understanding of topics discussed through group discussion and participation and Identified / Expressed personal readiness to practice skills    Treatment Plan:  Patient has a current master individualized treatment plan.  See Epic treatment plan for more information.    Clementina Chu RN

## 2021-07-09 NOTE — GROUP NOTE
Psychoeducation Group Note    PATIENT'S NAME: Robert Steele  MRN:   [id number]  :   2000  ACCT. NUMBER: [id number]  DATE OF SERVICE: 21  START TIME:  9:00 AM  END TIME:  9:50 AM  FACILITATOR: Maile Andrews OTR/L  TOPIC: MH Life Skills Group: Lifestyle Balance and Structure  [facility] Adult Dual Diagnosis Day Treatment  TRACK: 1    NUMBER OF PARTICIPANTS: 8                                      Service Modality:  Video Visit     Telemedicine Visit: The patient's condition can be safely assessed and treated via synchronous audio and visual telemedicine encounter.      Reason for Telemedicine Visit: Services only offered telehealth    Originating Site (Patient Location): Patient's home    Distant Site (Provider [facility]    Consent:  The patient/guardian has verbally consented to: the potential risks and benefits of telemedicine (video visit) versus in person care; bill my insurance or make self-payment for services provided; and responsibility for payment of non-covered services.     Patient would like the video invitation sent by:  My Chart    Mode of Communication:  Video Conference via Medical Zoom    As the provider I attest to compliance with applicable laws and regulations related to telemedicine.         Summary of Group / Topics Discussed:  Lifestyle Balance and Strucure:  Benefits of Leisure on Mental Health: Patients explored and learned about the benefits and possibilities of leisure activity to create lifestyle balance that supports their mental and physical wellbeing.  Patients were assisted to identify individualized leisure values and interests, recognized the benefits of leisure activity on mental health, and problem solved barriers to leisure engagement and strategies to overcome.  Patient engaged in an experiential leisure activity to gain self-awareness and build milieu social aspects and reflected on the  impact the experiential activity had on their mood.       Patient Session Goals / Objectives:    Increased awareness of the importance of engagement in leisure activities to support lifestyle balance and perceived quality of life    Identified strategies to recognize and challenge barriers to leisure participation     Facilitated exploration of meaningful leisure interests and values    Practiced and reflected on how to generalize taught skills to their everyday life        Patient Participation / Response:  Fully participated with the group by sharing personal reflections / insights and openly received / provided feedback with other participants.    Patient presentation: active in group discussion and giving feedback to peers, Verbalized understanding of content and Patient would benefit from additional opportunities to practice the content to be able to generalize it to their everyday life with increased intentionality, consistency, and efficacy in support of their psychiatric recovery    Treatment Plan:  Patient has a current master individualized treatment plan.  See Epic treatment plan for more information.    Maile Andrews, OTR/L

## 2021-07-12 ENCOUNTER — TELEPHONE (OUTPATIENT)
Dept: BEHAVIORAL HEALTH | Facility: CLINIC | Age: 21
End: 2021-07-12

## 2021-07-12 NOTE — TELEPHONE ENCOUNTER
Writer called client to explain their absence from group today.  Writer left them a voicemail asking that they return writer's call.    Nichelle Castaneda [facility] on 7/12/2021 at 9:56 AM

## 2021-07-13 ENCOUNTER — HOSPITAL ENCOUNTER (OUTPATIENT)
Dept: BEHAVIORAL HEALTH | Facility: CLINIC | Age: 21
End: 2021-07-13
Attending: PSYCHIATRY & NEUROLOGY
Payer: COMMERCIAL

## 2021-07-13 PROCEDURE — 90853 GROUP PSYCHOTHERAPY: CPT | Mod: 95 | Performed by: PSYCHOLOGIST

## 2021-07-13 PROCEDURE — 90853 GROUP PSYCHOTHERAPY: CPT | Mod: GT | Performed by: COUNSELOR

## 2021-07-13 NOTE — GROUP NOTE
Process Group Note    PATIENT'S NAME: Robert Steele  MRN:   [id number]  :   2000  ACCT. NUMBER: [id number]  DATE OF SERVICE: 21  START TIME: 10:00 AM  END TIME: 10:50 AM  FACILITATOR: Nichelle Castaneda [facility]  TOPIC:  Process Group    Diagnoses:  296.33 (F33.2) Major Depressive Disorder, Recurrent Episode, Severe With anxious distress  300.02 (F41.1) Generalized Anxiety Disorder.  4. Other Diagnoses that is relevant to services:  Substance-Related & Addictive Disorders Alcohol Use Disorder   303.90 (F10.20) Severe  304.30 (F12.20) Cannabis Use Disorder Severe       [facility] Day Treatment  TRACK: 1    NUMBER OF PARTICIPANTS: 7                                      Service Modality:  Video Visit     Telemedicine Visit: The patient's condition can be safely assessed and treated via synchronous audio and visual telemedicine encounter.      Reason for Telemedicine Visit: Services only offered telehealth    Originating Site (Patient Location): Patient's home    Distant Site (Provider Location): Provider Remote Setting- Home Office    Consent:  The patient/guardian has verbally consented to: the potential risks and benefits of telemedicine (video visit) versus in person care; bill my insurance or make self-payment for services provided; and responsibility for payment of non-covered services.     Patient would like the video invitation sent by:  My Chart    Mode of Communication:  Video Conference via Medical Zoom    As the provider I attest to compliance with applicable laws and regulations related to telemedicine.                Data:    Session content: At the start of this group, patients were invited to check in by identifying themselves, describing their current emotional status, and identifying issues to address in this group.   Area(s) of treatment focus addressed in this session included Symptom Management, Personal Safety and Abstinence/Relapse Prevention.    Nejuan  "reported feeling very frustrated with her work and is feeling like she may need to leave.  Her goal for the day is to keep her cool at work.  She took some time to further discuss her frustration and is not sure what to do in terms of staying there or leaving and going back to school.  She also discussed some strategies to keep herself calm and composed when feeling irritated/mad at work.      Therapeutic Interventions/Treatment Strategies:  Psychotherapist offered support, feedback and validation and reinforced use of skills. Treatment modalities used include Motivational Interviewing, Cognitive Behavioral Therapy and Dialectical Behavioral Therapy. Interventions include Coping Skills: Discussed how the use of intentional \"in the moment\" actions can help reduce distress.    Assessment:    Patient response:   Patient responded to session by accepting feedback, giving feedback and listening    Possible barriers to participation / learning include: and no barriers identified    Health Issues:   None reported       Substance Use Review:   Substance Use: Substance use has decreased    Mental Status/Behavioral Observations  Appearance:   Appropriate   Eye Contact:   Good   Psychomotor Behavior: Normal   Attitude:   Cooperative   Orientation:   All  Speech   Rate / Production: Normal    Volume:  Normal   Mood:    Irritable   Affect:    Appropriate   Thought Content:   Clear  Thought Form:  Coherent  Logical     Insight:    Good     Plan:     Safety Plan: No current safety concerns identified.  Recommended that patient call 911 or go to the local ED should there be a change in any of these risk factors.     Barriers to treatment: None identified    Patient Contracts (see media tab):  None    Substance Use: Provided encouragement towards sobriety    Provided support and affirmation for steps taken towards sobriety      Continue or Discharge: Patient will continue in Adult Dual Disorder Program (DDP) as planned. Patient is " likely to benefit from learning and using skills as they work toward the goals identified in their treatment plan.      Nichelle Castaneda, [facility]  July 13, 2021

## 2021-07-13 NOTE — GROUP NOTE
Psychotherapy Group Note    PATIENT'S NAME: Robert Steele  MRN:   [id number]  :   2000  ACCT. NUMBER: [id number]  DATE OF SERVICE: 21  START TIME:  9:00 AM  END TIME:  9:50 AM  FACILITATOR: Aaron Millard LP  TOPIC:  EBP Group: Specialty Awareness  [facility] Adult Dual Diagnosis Day Treatment  TRACK: 1    NUMBER OF PARTICIPANTS: 7    Telemedicine Visit: The patient's condition can be safely assessed and treated via synchronous audio and visual telemedicine encounter.      Reason for Telemedicine Visit: Services only offered telehealth    Originating Site (Patient Location): Patient's home    Distant Site (Provider Location): Provider Remote Setting- Home Office    Consent:  The patient/guardian has verbally consented to: the potential risks and benefits of telemedicine (video visit) versus in person care; bill my insurance or make self-payment for services provided; and responsibility for payment of non-covered services.     Mode of Communication:  Video Conference via [employer]    As the provider I attest to compliance with applicable laws and regulations related to telemedicine.      Summary of Group / Topics Discussed:  Specialty Topics: Trauma and PTSD: Patients were provided with information to understand the types and origins of trauma, the relationship between trauma and PTSD, the scope of Trauma-Informed Care, and treatment options. Patients were able to explore how trauma may have impacted their functioning directly or indirectly, with reference to the the complexity of trauma and associated treatment options. Patients reviewed their current awareness of this topic and relevance to their functioning. Individual experiences with symptoms and treatment options were discussed.     Patient Session Goals / Objectives:    Discussed definitions of trauma, Trauma-Informed Care, PTSD    Discussed how traumatic experiences affect the individual and their relationships  (directly, indirectly, brain development and function)    Identified how a history of trauma or exposure to trauma may impact group work    Assisted patients to find ways to adapt functioning in light of past traumatic experience(s), and to identify suitable treatment options and community resources      Patient Participation / Response:  Fully participated with the group by sharing personal reflections / insights and openly received / provided feedback with other participants.    Demonstrated understanding of topics discussed through group discussion and participation    Treatment Plan:  Patient has a current master individualized treatment plan.  See Epic treatment plan for more information.    Aaron Millard, LP

## 2021-07-14 ENCOUNTER — HOSPITAL ENCOUNTER (OUTPATIENT)
Dept: BEHAVIORAL HEALTH | Facility: CLINIC | Age: 21
End: 2021-07-14
Attending: PSYCHIATRY & NEUROLOGY
Payer: COMMERCIAL

## 2021-07-14 PROCEDURE — G0177 OPPS/PHP; TRAIN & EDUC SERV: HCPCS | Mod: GT | Performed by: OCCUPATIONAL THERAPIST

## 2021-07-14 PROCEDURE — 90853 GROUP PSYCHOTHERAPY: CPT | Mod: GT | Performed by: COUNSELOR

## 2021-07-14 PROCEDURE — G0177 OPPS/PHP; TRAIN & EDUC SERV: HCPCS | Mod: GT

## 2021-07-14 NOTE — GROUP NOTE
Process Group Note    PATIENT'S NAME: Robert Steele  MRN:   [id number]  :   2000  ACCT. NUMBER: [id number]  DATE OF SERVICE: 21  START TIME:  9:00 AM  END TIME:  9:50 AM  FACILITATOR: Nichelle Castaneda [facility]  TOPIC:  Process Group    Diagnoses:  296.33 (F33.2) Major Depressive Disorder, Recurrent Episode, Severe With anxious distress  300.02 (F41.1) Generalized Anxiety Disorder.  4. Other Diagnoses that is relevant to services:  Substance-Related & Addictive Disorders Alcohol Use Disorder   303.90 (F10.20) Severe  304.30 (F12.20) Cannabis Use Disorder Severe       [facility] Adult Dual Diagnosis Day Treatment  TRACK: 1    NUMBER OF PARTICIPANTS: 6                                      Service Modality:  Video Visit     Telemedicine Visit: The patient's condition can be safely assessed and treated via synchronous audio and visual telemedicine encounter.      Reason for Telemedicine Visit: Services only offered telehealth    Originating Site (Patient Location): Patient's home    Distant Site (Provider Location): Provider Remote Setting- Home Office    Consent:  The patient/guardian has verbally consented to: the potential risks and benefits of telemedicine (video visit) versus in person care; bill my insurance or make self-payment for services provided; and responsibility for payment of non-covered services.     Patient would like the video invitation sent by:  My Chart    Mode of Communication:  Video Conference via Medical Zoom    As the provider I attest to compliance with applicable laws and regulations related to telemedicine.              Data:    Session content: At the start of this group, patients were invited to check in by identifying themselves, describing their current emotional status, and identifying issues to address in this group.   Area(s) of treatment focus addressed in this session included Symptom Management, Personal Safety and Abstinence/Relapse Prevention.    Nejuan  "reported feeling \"better\" today but still frustrated about her job.  Her goal for the day is to make sure her client has a good visit with her mom.  She discussed how difficult it has been for her to focus on herself with how stressful things at work have been.  Client declined additional process time but contributed to group discussion and provided feedback and support to peers.      Therapeutic Interventions/Treatment Strategies:  Psychotherapist offered support, feedback and validation and reinforced use of skills.    Assessment:    Patient response:   Patient responded to session by accepting feedback, giving feedback and listening    Possible barriers to participation / learning include: and no barriers identified    Health Issues:   None reported       Substance Use Review:   Substance Use: Substance use has decreased    Mental Status/Behavioral Observations  Appearance:   Appropriate   Eye Contact:   Good   Psychomotor Behavior: Normal   Attitude:   Cooperative   Orientation:   All  Speech   Rate / Production: Normal    Volume:  Normal   Mood:    Anxious  Irritable   Affect:    Appropriate   Thought Content:   Clear  Thought Form:  Coherent  Logical     Insight:    Good     Plan:     Safety Plan: No current safety concerns identified.  Recommended that patient call 911 or go to the local ED should there be a change in any of these risk factors.     Barriers to treatment: None identified    Patient Contracts (see media tab):  None    Substance Use: Provided encouragement towards sobriety    Provided support and affirmation for steps taken towards sobriety      Continue or Discharge: Patient will continue in Adult Dual Disorder Program (DDP) as planned. Patient is likely to benefit from learning and using skills as they work toward the goals identified in their treatment plan.      Nichelle Castaneda, [facility]  July 14, 2021    "

## 2021-07-14 NOTE — GROUP NOTE
Psychoeducation Group Note    PATIENT'S NAME: Robert Steele  MRN:   [id number]  :   2000  ACCT. NUMBER: [id number]  DATE OF SERVICE: 21  START TIME: 10:00 AM  END TIME: 10:50 AM  FACILITATOR: Maile Andrews OTR/L  TOPIC: MH Life Skills Group: Resiliency Development  [facility] Adult Dual Diagnosis Day Treatment  TRACK: 1    NUMBER OF PARTICIPANTS: 7                                      Service Modality:  Video Visit     Telemedicine Visit: The patient's condition can be safely assessed and treated via synchronous audio and visual telemedicine encounter.      Reason for Telemedicine Visit: Services only offered telehealth    Originating Site (Patient Location): Patient's home    Distant Site (Provider Location): [facility]    Consent:  The patient/guardian has verbally consented to: the potential risks and benefits of telemedicine (video visit) versus in person care; bill my insurance or make self-payment for services provided; and responsibility for payment of non-covered services.     Patient would like the video invitation sent by:  My Chart    Mode of Communication:  Video Conference via Medical Zoom    As the provider I attest to compliance with applicable laws and regulations related to telemedicine.         Summary of Group / Topics Discussed:  Resiliency Development:  What is mindfulness?  Provide explanation of mindfulness and its application to each patient s general health, symptoms and stressors.  Discuss history, application to mental health therapies, key concepts, benefits and barriers to practice.  Mindfulness is the practice of focusing on the present moment, non-judgmentally.  Benefits include increasing awareness of thoughts, emotions, and behaviors with a goal of identifying patterns that may contribute to mental health symptoms and perception of stress.  Skills are taught and practiced in session to help each  patient apply the skills to their daily lives.     Patient Session Goals / Objectives:    Discuss patient s current awareness, knowledge and practice of mindfulness     Demonstrate understanding of key concepts    Identify when/how to use mindfulness practice Identified how using coping skills can be used for symptom and stress management          Established a relapse prevention plan to practice these skills in their own environments       Practiced and reflected on how to generalize taught skills to their everyday life                     Patient Participation / Response:  Moderately participated, sharing some personal reflections / insights and adequately adequately received / provided feedback with other participants.    Patient presentation: presented with low energy-laying in bed today; did contribute to discussion; constricted affect, Verbalized understanding of content and Patient would benefit from additional opportunities to practice the content to be able to generalize it to their everyday life with increased intentionality, consistency, and efficacy in support of their psychiatric recovery    Treatment Plan:  Patient has a current master individualized treatment plan.  See Epic treatment plan for more information.    Maile Andrews, OTR/L

## 2021-07-14 NOTE — GROUP NOTE
Psychoeducation Group Note    PATIENT'S NAME: Robert Steele  MRN:   [id number]  :   2000  ACCT. NUMBER: [id number]  DATE OF SERVICE: 21  START TIME: 11:00 AM  END TIME: 11:50 AM  FACILITATOR: Clementina Chu RN  TOPIC: HE RN Group: [facility]                                    Service Modality:  Video Visit     Telemedicine Visit: The patient's condition can be safely assessed and treated via synchronous audio and visual telemedicine encounter.      Reason for Telemedicine Visit:  covid19    Originating Site (Patient Location): Patient's home    Distant Site (Provider Location): Provider Remote Setting- Home Office    Consent:  The patient/guardian has verbally consented to: the potential risks and benefits of telemedicine (video visit) versus in person care; bill my insurance or make self-payment for services provided; and responsibility for payment of non-covered services.     Patient would like the video invitation sent by:  My Chart    Mode of Communication:  Video Conference via Medical Zoom    As the provider I attest to compliance with applicable laws and regulations related to telemedicine.          [facility] Adult Dual Diagnosis Day Treatment  TRACK: 1    NUMBER OF PARTICIPANTS: 7    Summary of Group / Topics Discussed:  Foundations of Health: Nutrition: MICD nutrition; Gut-Brain connection: Patients were provided education on the role of nutrition in the body and all of the functions that nutrition influences including energy, body structure and bodily function as overarching categories. Select macronutrients and micronutrients and their important roles and functions were also reviewed. Chemical and substance use disrupt how we eat, how our organs behave, and what our body does with the food that we do eat. Patients were educated on the effects that chemicals have on nutritional status and ways in which nutrition can be promoted while in recovery.      Patient Session  Goals / Objectives:  ? Identified hunger as a factor that can increase risk for chemical/substance relapse  ? Described the role of macronutrients and micronutrients in the body  ? Explained the effects of chemicals/substances on nutrition  ? Listed strategies for promoting balanced nutrition to promote wellness and recovery      Patient Participation / Response:  Fully participated with the group by sharing personal reflections / insights and openly received / provided feedback with other participants.    Demonstrated understanding of topics discussed through group discussion and participation and Identified / Expressed personal readiness to practice skills    Treatment Plan:  Patient has a current master individualized treatment plan.  See Epic treatment plan for more information.    Clementina Cuh RN

## 2021-07-15 ENCOUNTER — HOSPITAL ENCOUNTER (OUTPATIENT)
Dept: BEHAVIORAL HEALTH | Facility: CLINIC | Age: 21
End: 2021-07-15
Attending: PSYCHIATRY & NEUROLOGY
Payer: COMMERCIAL

## 2021-07-15 PROCEDURE — 90853 GROUP PSYCHOTHERAPY: CPT | Mod: GT | Performed by: COUNSELOR

## 2021-07-15 PROCEDURE — G0177 OPPS/PHP; TRAIN & EDUC SERV: HCPCS | Mod: GT | Performed by: OCCUPATIONAL THERAPIST

## 2021-07-15 PROCEDURE — G0177 OPPS/PHP; TRAIN & EDUC SERV: HCPCS | Mod: GT

## 2021-07-15 NOTE — GROUP NOTE
Psychoeducation Group Note    PATIENT'S NAME: Robert Steele  MRN:   [id number]  :   2000  ACCT. NUMBER: [id number]  DATE OF SERVICE: 7/15/21  START TIME:  9:00 AM  END TIME:  9:50 AM  FACILITATOR: Maile Andrews OTR/L  TOPIC: MH Life Skills Group: Sensory Approaches in Mental Health  [facility] Adult Dual Diagnosis Day Treatment  TRACK: 1    NUMBER OF PARTICIPANTS: 7                                      Service Modality:  Video Visit     Telemedicine Visit: The patient's condition can be safely assessed and treated via synchronous audio and visual telemedicine encounter.      Reason for Telemedicine Visit: Services only offered telehealth    Originating Site (Patient Location): Patient's home    Distant Site (Provider Location): [facility]    Consent:  The patient/guardian has verbally consented to: the potential risks and benefits of telemedicine (video visit) versus in person care; bill my insurance or make self-payment for services provided; and responsibility for payment of non-covered services.     Patient would like the video invitation sent by:  My Chart    Mode of Communication:  Video Conference via Medical Zoom    As the provider I attest to compliance with applicable laws and regulations related to telemedicine.         Summary of Group / Topics Discussed:  Sensory Approaches in Mental Health:  Coping Through the Senses Introduction: Part 1: Patients were introduced and taught about neurosensory based skills and strategies related to supporting effective self regulation skills.  Patients were taught about the external sensory systems (taste, smell, auditory, touch and visual) and how they can be used for coping with mental health symptoms and stressors.  Patients were provided with an experiential opportunity to increase self-awareness of helpful sensory input and self care activities. Patients were introduced on how to create  supportive environments that encourage use of these skills.         Patient Session Goals / Objectives:    Identified specific and individualized neurosensory skills to help when distressed      Identified skills learned and how this applies to current daily life    Established a plan for practice of these skills in their own environments        Patient Participation / Response:  Fully participated with the group by sharing personal reflections / insights and openly received / provided feedback with other participants.    Patient presentation: active in group discussion sharing ideas/insights, Verbalized understanding of content and Patient would benefit from additional opportunities to practice the content to be able to generalize it to their everyday life with increased intentionality, consistency, and efficacy in support of their psychiatric recovery    Treatment Plan:  Patient has a current master individualized treatment plan.  See Epic treatment plan for more information.    Maile Andrews, OTR/L

## 2021-07-15 NOTE — GROUP NOTE
Psychoeducation Group Note    PATIENT'S NAME: Robert Steele  MRN:   [id number]  :   2000  ACCT. NUMBER: [id number]  DATE OF SERVICE: 7/15/21  START TIME: 11:00 AM  END TIME: 11:50 AM  FACILITATOR: Kevin Middleton RN  TOPIC: HE RN Group: Health Maintenance  [facility] Adult Dual Diagnosis Day Treatment  TRACK:  ONE    NUMBER OF PARTICIPANTS: 7    Summary of Group / Topics Discussed:  Health Maintenance: Weekend planning: Patients were given time to complete a weekend plan of what they will do to promote wellness and sobriety over the weekend when they do not have the structure of group. Patients were encouraged to review progress on their treatment goals and were challenged to identify ways to work toward meeting them. Patients identified and discussed foreseeable barriers to success over the weekend and then developed a plan to overcome them. Patients reviewed their distress coping skills and social support network and discussed this with the group.       Patient Session Goals / Objectives:    ?    Identified activities to engage in that promote balance in wellness  ?    Distinguished possible barriers to success over the weekend and created a plan to overcome them  ?    Listed distress coping skills and identified social support network to utilize if in crisis during the weekend          Patient Participation / Response:  Fully participated with the group by sharing personal reflections / insights and openly received / provided feedback with other participants.    Demonstrated understanding of topics discussed through group discussion and participation    Treatment Plan:  Patient has a current master individualized treatment plan.  See Epic treatment plan for more information.    Kevin Middleton RN

## 2021-07-15 NOTE — ADDENDUM NOTE
Encounter addended by: Maile Andrews OTR/L on: 7/15/2021 1:37 PM   Actions taken: Clinical Note Signed, Episode edited, Charge Capture section accepted

## 2021-07-15 NOTE — GROUP NOTE
Process Group Note    PATIENT'S NAME: Robert Steele  MRN:   [id number]  :   2000  ACCT. NUMBER: [id number]  DATE OF SERVICE: 7/15/21  START TIME: 10:00 AM  END TIME: 10:50 AM  FACILITATOR: Nichelle Castaneda [facility]  TOPIC:  Process Group    Diagnoses:  296.33 (F33.2) Major Depressive Disorder, Recurrent Episode, Severe With anxious distress  300.02 (F41.1) Generalized Anxiety Disorder.  4. Other Diagnoses that is relevant to services:  Substance-Related & Addictive Disorders Alcohol Use Disorder   303.90 (F10.20) Severe  304.30 (F12.20) Cannabis Use Disorder Severe       [facility] Adult Dual Diagnosis Day Treatment  TRACK: 1    NUMBER OF PARTICIPANTS: 7                                      Service Modality:  Video Visit     Telemedicine Visit: The patient's condition can be safely assessed and treated via synchronous audio and visual telemedicine encounter.      Reason for Telemedicine Visit: Services only offered telehealth    Originating Site (Patient Location): Patient's home    Distant Site (Provider Location): Provider Remote Setting- Home Office    Consent:  The patient/guardian has verbally consented to: the potential risks and benefits of telemedicine (video visit) versus in person care; bill my insurance or make self-payment for services provided; and responsibility for payment of non-covered services.     Patient would like the video invitation sent by:  My Chart    Mode of Communication:  Video Conference via Medical Zoom    As the provider I attest to compliance with applicable laws and regulations related to telemedicine.                Data:    Session content: At the start of this group, patients were invited to check in by identifying themselves, describing their current emotional status, and identifying issues to address in this group.   Area(s) of treatment focus addressed in this session included Symptom Management, Personal Safety and Abstinence/Relapse Prevention.    Nejuan  "reported feeling \"seen and \"heard\" today.  Her goal for the day is to to go [employer] and stick to her list.  She reported that she asked her boss to be relocated and was told she had to put in her 2 weeks notice in order to be relocated.  Her boss told her that they are \"not willing\" to let her know.  Her boss reached out to her supervisor and will discuss it further at their next staff meeting.      Therapeutic Interventions/Treatment Strategies:  Psychotherapist offered support, feedback and validation and reinforced use of skills.    Assessment:    Patient response:   Patient responded to session by accepting feedback, giving feedback and listening    Possible barriers to participation / learning include: and no barriers identified    Health Issues:   None reported       Substance Use Review:   Substance Use: Substance use has decreased    Mental Status/Behavioral Observations  Appearance:   Appropriate   Eye Contact:   Good   Psychomotor Behavior: Normal   Attitude:   Cooperative   Orientation:   All  Speech   Rate / Production: Normal    Volume:  Normal   Mood:    Normal  Affect:    Appropriate   Thought Content:   Clear  Thought Form:  Coherent  Logical     Insight:    Good     Plan:     Safety Plan: No current safety concerns identified.  Recommended that patient call 911 or go to the local ED should there be a change in any of these risk factors.     Barriers to treatment: None identified    Patient Contracts (see media tab):  None    Substance Use: Provided encouragement towards sobriety    Provided support and affirmation for steps taken towards sobriety      Continue or Discharge: Patient will continue in Adult Day Treatment (ADT)  as planned. Patient is likely to benefit from learning and using skills as they work toward the goals identified in their treatment plan.      Nichelle Castaneda, [facility]  July 15, 2021    "

## 2021-07-16 ENCOUNTER — HOSPITAL ENCOUNTER (OUTPATIENT)
Dept: BEHAVIORAL HEALTH | Facility: CLINIC | Age: 21
End: 2021-07-16
Attending: PSYCHIATRY & NEUROLOGY
Payer: COMMERCIAL

## 2021-07-16 PROCEDURE — G0177 OPPS/PHP; TRAIN & EDUC SERV: HCPCS | Mod: GT | Performed by: OCCUPATIONAL THERAPIST

## 2021-07-16 NOTE — GROUP NOTE
Psychoeducation Group Note    PATIENT'S NAME: Robert Steele  MRN:   [id number]  :   2000  ACCT. NUMBER: [id number]  DATE OF SERVICE: 21  START TIME:  9:00 AM  END TIME:  9:50 AM  FACILITATOR: Maile Andrews OTR/L  TOPIC: MH Life Skills Group: Sensory Approaches in Mental Health  [facility] Adult Dual Diagnosis Day Treatment  TRACK: 1    NUMBER OF PARTICIPANTS: 7                                      Service Modality:  Video Visit     Telemedicine Visit: The patient's condition can be safely assessed and treated via synchronous audio and visual telemedicine encounter.      Reason for Telemedicine Visit: Services only offered telehealth    Originating Site (Patient Location): Patient's home    Distant Site (Provider Location): [facility]    Consent:  The patient/guardian has verbally consented to: the potential risks and benefits of telemedicine (video visit) versus in person care; bill my insurance or make self-payment for services provided; and responsibility for payment of non-covered services.     Patient would like the video invitation sent by:  My Chart    Mode of Communication:  Video Conference via Medical Zoom    As the provider I attest to compliance with applicable laws and regulations related to telemedicine.         Summary of Group / Topics Discussed:  Sensory Approaches in Mental Health:  Coping Through the Senses Introduction Part 2: Patients were introduced and taught about neurosensory based skills and strategies related to supporting effective self regulation skills.  Patients were taught about the internal sensory systems (proprioception, vestibular, deep touch, and interoception) and how they can be used for coping with mental health symptoms and stressors.  Patients were provided with an experiential opportunity to increase self-awareness of helpful sensory input and self care activities. Patients were introduced on  how to create supportive environments that encourage use of these skills.         Patient Session Goals / Objectives:    Identified specific and individualized neurosensory skills to help when distressed      Identified skills learned and how this applies to current daily life    Established a plan for practice of these skills in their own environments        Patient Participation / Response:  Moderately participated, sharing some personal reflections / insights and adequately adequately received / provided feedback with other participants.    Patient presentation: active in group discussion at beginning of group; left rafa(last 10 minutes) for unknown reason, Verbalized understanding of content and Patient would benefit from additional opportunities to practice the content to be able to generalize it to their everyday life with increased intentionality, consistency, and efficacy in support of their psychiatric recovery    Treatment Plan:  Patient has a current master individualized treatment plan.  See Epic treatment plan for more information.    Maile Andrews, OTR/L

## 2021-07-19 ENCOUNTER — TELEPHONE (OUTPATIENT)
Dept: BEHAVIORAL HEALTH | Facility: CLINIC | Age: 21
End: 2021-07-19

## 2021-07-19 NOTE — TELEPHONE ENCOUNTER
Writer called client to explain their absence from group today.  Writer left them a voicemail asking that they return writer's call.    Nichelle Castaneda [facility] on 7/19/2021 at 4:14 PM

## 2021-07-20 ENCOUNTER — TELEPHONE (OUTPATIENT)
Dept: BEHAVIORAL HEALTH | Facility: CLINIC | Age: 21
End: 2021-07-20

## 2021-07-20 NOTE — TELEPHONE ENCOUNTER
Writer called to check in regarding attendance.  Pt reported working a double today, working several doubles (getting off at 8am). Doesn't know if can finish program. Can't control work schedule. Trying to balance putting work first to prioritizing self also.  Would like to do ADT and discharge DDP.    Writer sent her the PHQ9 and GAD7 to [facility], went over ITP, coordinated with supervisors to transfer to ADT.    IT - 7/26, 10am  No psychiatrist, in process at [facility]    Clementina Chu RN on 7/20/2021 at 2:31 PM

## 2021-07-21 NOTE — DISCHARGE SUMMARY
Adult Dual Disorder Program   Discharge Summary/Instructions     Patient: Robert Steele MRN: [id number]  : 2000 Age:  21 year old Sex:  female    Admission Date: 21  Discharge Date: 21  Diagnosis:   296.33 (F33.2) Major Depressive Disorder, Recurrent Episode, Severe With anxious distress  300.02 (F41.1) Generalized Anxiety Disorder.  4. Other Diagnoses that is relevant to services:  Substance-Related & Addictive Disorders Alcohol Use Disorder   303.90 (F10.20) Severe  304.30 (F12.20) Cannabis Use Disorder Severe       Focus of Treatment / Discharge Recommendations:    Personal Safety/ Management of Symptoms:    * Follow your safety plan.  Report increased symptoms to your care team and/or use the crisis resources listed below.    Crisis Resources:    Suicide Prevention Lifeline: 7-602-738-ZJSS (1934)    Crisis Text Line Service (available 24 hours a day, 7 days a week): Text [place] to 128310    Call  **CRISIS (520930) from a cell phone to talk to a team of professionals who can help you.  Crisis Services By [facility]: Phone Number:   [place]     [phone]   [place]    [phone]   [place]    [phone]   [place]    [phone]   [place]    [phone]   [place] [phone]   [place]     [phone]       Call 911 or go to my nearest emergency department.     Managing Symptoms / Abstinence / Preventing Relapse:    Take all medicines as directed.  Carry a current list of medicines with you.    Use coping skills: mindfulness, distraction, breathing, etc.    Do not use illicit (street) drugs, controlled substances (narcotics) or alcohol.    Go to all appointments.    Report symptoms to your care team including: thoughts of suicide, loss of sleep, increased confusion, mood getting worse, feeling more aggressive, or substance use.    Develop/Improve Independent Living/Socialization Skills: Attend community support groups such as AA, NA, SMART Recovery, [facility] Sob[facility], RENE    Community  Resources/Supports and Discharge Planning:      Follow up with psychiatrist / main caregiver: Getting set up Next visit: N/A    Follow up with your therapist: Unknown    Next visit: 7/26    Go to group therapy and / or support groups at: ADT 1A on Monday, Wednesday, and Friday from 9am-12pm.  Start on 7/21    See your medical doctor about:  Any physical health concerns    Other:  N/A    Copy of summary sent to: [facility]      Client Signature:___unavailable to sign due to COVID-19______________________________   Date / Time:___________    Staff Signature:____Nichelle Castaneda [facility] on 7/21/2021 at 9:28 AM  ______________________________   Date / Time:___________

## 2021-07-22 ENCOUNTER — TELEPHONE (OUTPATIENT)
Dept: BEHAVIORAL HEALTH | Facility: CLINIC | Age: 21
End: 2021-07-22

## 2021-07-22 NOTE — TELEPHONE ENCOUNTER
Writer called to confirm starting in the 1A ADT group tomorrow.  Pt said she can commit to 80%, minimum 1 month. Also, she might have indiv therapy Monday morning but will confirm that tomorrow in group. She still needs to complete her PHQ9 and GAD7 but will do that before group. (sent to [employer]). No safety concerns. Goal for individual therapy weekly following ADT.  Clementina Chu RN on 7/22/2021 at 12:10 PM

## 2021-07-26 NOTE — DISCHARGE SUMMARY
Adult Mental Health Intensive Outpatient Discharge Summary/Instructions      Patient: Khadar Steele       MRN: [id number]  : [birth year]         Age: 21 year old              Admission Date: 21  Discharge Date:2021   Diagnosis: 296.33 (F33.2) Major Depressive Disorder, Recurrent Episode, Severe With anxious distress  300.02 (F41.1) Generalized Anxiety Disorder.  4. Other Diagnoses that is relevant to services:  Substance-Related & Addictive Disorders Alcohol Use Disorder   303.90 (F10.20) Severe  304.30 (F12.20) Cannabis Use Disorder Severe        Focus of Treatment / Progress     Personal Safety: Client attended on day and left prior to checking in.  She left voicemail stating she was withdrawing from group and did not endorse safety concerns.                  * Follow your safety plan                 * Call crisis lines as needed:     [facility] [phone]                 [facility] [phone]  [facility] [phone]                 Crisis Connection [phone]  [facility] [phone]                [facility] [phone]  [facility] [phone]            National Suicide Prevention [phone]  [facility] [phone]               Suicide Prevention [phone]  [facility] [phone]     Managing symptoms of:  depression, anxiety, substance use     Community support/health:  [place] [address] ([phone]) [email or web address], [place] Crisis text line( Text [place] to 267318),  [facility]  [place], [place] ([phone])  [facility] [place], [place] ( [phone])  [facility] [address], [place], [place], [address] [phone]     Managing Symptoms and Preventing Relapse     * Go to all of your appointments     * Take all medications as directed.      * Carry a current list if medication with you     * Do not use illicit (street) drugs.  Avoid alcohol     * Report these symptoms  "to your care team. These are early signs of relapse:              Thoughts of suicide              Losing more sleep              Increased confusion              Mood getting worse              Feeling more aggressive              Other:  isolation, rumination, substance use.     *Use these skills daily:  Talk to someone you trust at least one time weekly, set boundaries and say \"no\", be assertive, act opposite of negative feelings, accept challenges with a positive attitude, exercise at least three times per week for 30 minutes,  get enough sleep, eat healthy foods, get into a good routine     Copy of summary sent to: In Epic My Chart     Follow up with psychiatrist / main caregiver: Continue to work on setting up from DDP referrals    Next visit: TBD     Follow up with your therapist: Unknown   Next visit: 7/26/21     Go to group therapy and / or support groups at: [facility] and [facility]ipolar [facility] [place](DBSA) support groups     See your medical doctor about:  For an annual physical exam or any general wellness or illness as needed.     Other:  Your treatment team appreciates having the opportunity to work with you and wishes you the best.     Client Signature:_______________________  Date / Time:___________  Staff Signature:________________________   Date / Time:___________    "

## 2021-08-09 ENCOUNTER — NURSE TRIAGE (OUTPATIENT)
Dept: NURSING | Facility: CLINIC | Age: 21
End: 2021-08-09

## 2021-08-10 NOTE — TELEPHONE ENCOUNTER
Call placed to pt per request of     Ward is emotional and crying.    She reports that she's been feeling weak and fatigued that past couple of days.  She has also been nauseated and not tolerating food.  She has had loose stools    She was seen in an UCC on 8/6 and diagnosed with Bacterial Vaginosis and a vaginal yeast infection.  She was given Rx for metronidazole and Diflucan.  She was advised by the pharmacist to complete the metronidazole prior to starting the Diflucan    Today she donated at a plasma center to get gas money  She was feeling OK at the time.    She had not eaten today and tried eating some chicken but ended up vomiting.    She feels weak and lightheaded.   Her tongue is dry.  She reports decreased urine output and darker color    Per protocol, ER advised or PCP triage  Her PCP is with [facility]. Suggested calling clinic to speak to an on-call provider or Present to ER    She reports that she might need a pregnancy test.  Suggested OTC pregnancy testing and advised to follow instructions to use with first urine of the morning.  Also notified that it is possible to have a blood test for pregnancy.    She sounds much calmer at this point.  No further questions    COVID 19 Nurse Triage Plan/Patient Instructions    Please be aware that novel coronavirus (COVID-19) may be circulating in the community. If you develop symptoms such as fever, cough, or SOB or if you have concerns about the presence of another infection including coronavirus (COVID-19), please contact your health care provider or visit [email or web address].     Disposition/Instructions    Virtual Visit with provider recommended. Reference Visit Selection Guide.  ED Visit recommended. Follow protocol based instructions.     Bring Your Own Device:  Please also bring your smart device(s) (smart phones, tablets, laptops) and their charging cables for your personal use and to communicate with your care team during your  visit.    Thank you for taking steps to prevent the spread of this virus.  o Limit your contact with others.  o Wear a simple mask to cover your cough.  o Wash your hands well and often.    Resources    [facility]: About COVID-19: [email or web address]    CDC: What to Do If You're Sick: www.cdc.gov/coronavirus/2019-ncov/about/steps-when-sick.html    CDC: Ending Home Isolation: www.cdc.gov/coronavirus/2019-ncov/hcp/disposition-in-home-patients.html     CDC: Caring for Someone: www.cdc.gov/coronavirus/2019-ncov/if-you-are-sick/care-for-someone.html     [facility]: Interim Guidance for Hospital Discharge to Home: www.health.[facility].[place]./diseases/coronavirus/hcp/hospdischarge.pdf    [facility] clinical trials (COVID-19 research studies): [email or web address].[facility]-clinical-trials     Below are the COVID-19 hotlines at the [place] Department of Health ([facility]). Interpreters are available.   o For health questions: Call 189-742-4208 or [phone] (7 a.m. to 7 p.m.)  o For questions about schools and childcare: Call 105-779-3073 or [phone] (7 a.m. to 7 p.m.)     Gerri Genao RN  [facility] Nurse Advisors      Reason for Disposition    [1] Drinking very little AND [2] dehydration suspected (e.g., no urine > 12 hours, very dry mouth, very lightheaded)    Additional Information    Negative: Shock suspected (e.g., cold/pale/clammy skin, too weak to stand, low BP, rapid pulse)    Negative: Sounds like a life-threatening emergency to the triager    Negative: Unable to walk, or can only walk with assistance (e.g., requires support)    Negative: Difficulty breathing    Negative: Severe difficulty breathing (e.g., struggling for each breath, speaks in single words)    Negative: Shock suspected (e.g., cold/pale/clammy skin, too weak to stand, low BP, rapid pulse)    Negative: Difficult to awaken or acting confused (e.g., disoriented, slurred speech)    Negative: [1] Fainted > 15 minutes  "ago AND [2] still feels too weak or dizzy to stand    Negative: [1] SEVERE weakness (i.e., unable to walk or barely able to walk, requires support) AND [2] new onset or worsening    Negative: Sounds like a life-threatening emergency to the triager    Negative: Difficulty breathing    Negative: Heart beating < 50 beats per minute OR > 140 beats per minute    Negative: Extra heart beats OR irregular heart beating   (i.e., \"palpitations\")    Negative: Follows bleeding (e.g., from vomiting, rectum, vagina; Exception: small brief weakness from sight of a small amount blood)    Negative: Black or tarry bowel movements    Protocols used: WEAKNESS (GENERALIZED) AND FATIGUE-A-AH, NAUSEA-A-AH      "

## 2021-10-17 ENCOUNTER — HEALTH MAINTENANCE LETTER (OUTPATIENT)
Age: 21
End: 2021-10-17

## 2022-01-14 ENCOUNTER — APPOINTMENT (OUTPATIENT)
Dept: CT IMAGING | Facility: CLINIC | Age: 22
End: 2022-01-14
Attending: EMERGENCY MEDICINE
Payer: COMMERCIAL

## 2022-01-14 ENCOUNTER — HOSPITAL ENCOUNTER (EMERGENCY)
Facility: CLINIC | Age: 22
Discharge: LEFT BEFORE DISCHARGE | End: 2022-01-14
Attending: EMERGENCY MEDICINE | Admitting: EMERGENCY MEDICINE
Payer: COMMERCIAL

## 2022-01-14 VITALS
RESPIRATION RATE: 16 BRPM | BODY MASS INDEX: 32.78 KG/M2 | TEMPERATURE: 99.3 F | SYSTOLIC BLOOD PRESSURE: 142 MMHG | HEART RATE: 103 BPM | WEIGHT: 222 LBS | DIASTOLIC BLOOD PRESSURE: 110 MMHG | OXYGEN SATURATION: 99 %

## 2022-01-14 PROBLEM — N73.0 PID (ACUTE PELVIC INFLAMMATORY DISEASE): Status: ACTIVE | Noted: 2018-02-14

## 2022-01-14 PROBLEM — F17.200 TOBACCO DEPENDENCE: Status: ACTIVE | Noted: 2017-04-05

## 2022-01-14 LAB
ALBUMIN SERPL-MCNC: 3.4 G/DL (ref 3.5–5)
ALP SERPL-CCNC: 52 U/L (ref 45–120)
ALT SERPL W P-5'-P-CCNC: 14 U/L (ref 0–45)
ANION GAP SERPL CALCULATED.3IONS-SCNC: 11 MMOL/L (ref 5–18)
AST SERPL W P-5'-P-CCNC: 18 U/L (ref 0–40)
BILIRUB SERPL-MCNC: 0.6 MG/DL (ref 0–1)
BUN SERPL-MCNC: 8 MG/DL (ref 8–22)
CALCIUM SERPL-MCNC: 8.8 MG/DL (ref 8.5–10.5)
CHLORIDE BLD-SCNC: 109 MMOL/L (ref 98–107)
CO2 SERPL-SCNC: 17 MMOL/L (ref 22–31)
CREAT SERPL-MCNC: 0.74 MG/DL (ref 0.6–1.1)
ERYTHROCYTE [DISTWIDTH] IN BLOOD BY AUTOMATED COUNT: 13.4 % (ref 10–15)
GFR SERPL CREATININE-BSD FRML MDRD: >90 ML/MIN/1.73M2
GLUCOSE BLD-MCNC: 91 MG/DL (ref 70–125)
HCG SERPL QL: NEGATIVE
HCT VFR BLD AUTO: 47.4 % (ref 35–47)
HGB BLD-MCNC: 15.7 G/DL (ref 11.7–15.7)
LIPASE SERPL-CCNC: 12 U/L (ref 0–52)
MCH RBC QN AUTO: 28.6 PG (ref 26.5–33)
MCHC RBC AUTO-ENTMCNC: 33.1 G/DL (ref 31.5–36.5)
MCV RBC AUTO: 86 FL (ref 78–100)
PLATELET # BLD AUTO: 269 10E3/UL (ref 150–450)
POTASSIUM BLD-SCNC: 3.5 MMOL/L (ref 3.5–5)
PROT SERPL-MCNC: 6.6 G/DL (ref 6–8)
RBC # BLD AUTO: 5.49 10E6/UL (ref 3.8–5.2)
SODIUM SERPL-SCNC: 137 MMOL/L (ref 136–145)
WBC # BLD AUTO: 8.8 10E3/UL (ref 4–11)

## 2022-01-14 PROCEDURE — 83690 ASSAY OF LIPASE: CPT | Performed by: EMERGENCY MEDICINE

## 2022-01-14 PROCEDURE — 250N000011 HC RX IP 250 OP 636: Performed by: EMERGENCY MEDICINE

## 2022-01-14 PROCEDURE — 84703 CHORIONIC GONADOTROPIN ASSAY: CPT | Performed by: EMERGENCY MEDICINE

## 2022-01-14 PROCEDURE — 999N000104 HC STATISTIC NO CHARGE

## 2022-01-14 PROCEDURE — 36415 COLL VENOUS BLD VENIPUNCTURE: CPT | Performed by: EMERGENCY MEDICINE

## 2022-01-14 PROCEDURE — 80053 COMPREHEN METABOLIC PANEL: CPT | Performed by: EMERGENCY MEDICINE

## 2022-01-14 PROCEDURE — 74177 CT ABD & PELVIS W/CONTRAST: CPT

## 2022-01-14 PROCEDURE — 85027 COMPLETE CBC AUTOMATED: CPT | Performed by: EMERGENCY MEDICINE

## 2022-01-14 RX ORDER — IOPAMIDOL 755 MG/ML
100 INJECTION, SOLUTION INTRAVASCULAR ONCE
Status: COMPLETED | OUTPATIENT
Start: 2022-01-14 | End: 2022-01-14

## 2022-01-14 RX ADMIN — IOPAMIDOL 100 ML: 755 INJECTION, SOLUTION INTRAVENOUS at 19:29

## 2022-01-14 NOTE — ED TRIAGE NOTES
Pt reports lower abdominal pain with tenderness, more on the right side, diarrhea and nausea x 6 days. Pt was seen at Urgent care and was sent here for rule out appendicitis.

## 2022-01-15 NOTE — ED NOTES
Patient walked up to registration and stated she was leaving, asked if we had hazardous waste disposal and held out her iv she has discontinued herself;  A tech took the iv catheter and the patient walked out the door.

## 2022-05-03 NOTE — GROUP NOTE
Psychotherapy Group Note    PATIENT'S NAME: Robert Steele  MRN:   [id number]  :   2000  ACCT. NUMBER: [id number]  DATE OF SERVICE: 21  START TIME:  9:00 AM  END TIME:  9:50 AM  FACILITATOR: Aaron Millard LP  TOPIC:  EBP Group: Emotions Management  [facility] Adult Dual Diagnosis Day Treatment  TRACK: 1    NUMBER OF PARTICIPANTS: 6    Telemedicine Visit: The patient's condition can be safely assessed and treated via synchronous audio and visual telemedicine encounter.      Reason for Telemedicine Visit: Services only offered telehealth    Originating Site (Patient Location): Patient's home    Distant Site (Provider Location): Provider Remote Setting- Home Office    Consent:  The patient/guardian has verbally consented to: the potential risks and benefits of telemedicine (video visit) versus in person care; bill my insurance or make self-payment for services provided; and responsibility for payment of non-covered services.     Mode of Communication:  Video Conference via [employer]    As the provider I attest to compliance with applicable laws and regulations related to telemedicine.      Summary of Group / Topics Discussed:  Emotions Management: Opposite to Emotion: Patients discussed past and present struggles with knowing how to make changes in their lives due to difficult emotional experiences.  Explored desires to experience and feel less anger, sadness, guilt, and fear.  Reviewed the therapeutic skill of opposite action and patients explored opportunities to use their behaviors as a tool to reduce an emotion that they want to change.     Patient Session Goals / Objectives:    Review DBT concepts and focus on patient s experiences of distress and difficult emotional experiences.    Learn how to do the opposite of what an emotion makes us want to do in an effort to decrease an unwanted emotional experience.    Demonstrate understanding of the skill of opposite action by  Pt calling office back    Telephone Information:   Mobile [phone]        sharing experiences where the technique could be useful in past / present situations.      Patient Participation / Response:  Fully participated with the group by sharing personal reflections / insights and openly received / provided feedback with other participants.    Demonstrated understanding of topics discussed through group discussion and participation    Treatment Plan:  Patient has a current master individualized treatment plan.  See Epic treatment plan for more information.    Aaron Millard, LP

## 2022-07-23 ENCOUNTER — HEALTH MAINTENANCE LETTER (OUTPATIENT)
Age: 22
End: 2022-07-23

## 2022-09-28 ENCOUNTER — HOSPITAL ENCOUNTER (EMERGENCY)
Facility: CLINIC | Age: 22
Discharge: HOME | End: 2022-09-29
Attending: EMERGENCY MEDICINE | Admitting: EMERGENCY MEDICINE
Payer: COMMERCIAL

## 2022-09-28 VITALS
SYSTOLIC BLOOD PRESSURE: 129 MMHG | BODY MASS INDEX: 32.88 KG/M2 | OXYGEN SATURATION: 99 % | HEIGHT: 69 IN | RESPIRATION RATE: 18 BRPM | HEART RATE: 84 BPM | DIASTOLIC BLOOD PRESSURE: 82 MMHG | TEMPERATURE: 98.9 F | WEIGHT: 222 LBS

## 2022-09-28 DIAGNOSIS — N73.0 PID (ACUTE PELVIC INFLAMMATORY DISEASE): ICD-10-CM

## 2022-09-28 DIAGNOSIS — R10.84 ABDOMINAL PAIN, GENERALIZED: ICD-10-CM

## 2022-09-28 LAB
ALBUMIN UR-MCNC: NEGATIVE MG/DL
APPEARANCE UR: CLEAR
BASOPHILS # BLD AUTO: 0.1 10E3/UL (ref 0–0.2)
BASOPHILS NFR BLD AUTO: 1 %
BILIRUB UR QL STRIP: NEGATIVE
COLOR UR AUTO: COLORLESS
EOSINOPHIL # BLD AUTO: 0.4 10E3/UL (ref 0–0.7)
EOSINOPHIL NFR BLD AUTO: 5 %
ERYTHROCYTE [DISTWIDTH] IN BLOOD BY AUTOMATED COUNT: 13.6 % (ref 10–15)
GLUCOSE UR STRIP-MCNC: NEGATIVE MG/DL
HCG UR QL: NEGATIVE
HCT VFR BLD AUTO: 40.7 % (ref 35–47)
HGB BLD-MCNC: 13.7 G/DL (ref 11.7–15.7)
HGB UR QL STRIP: NEGATIVE
IMM GRANULOCYTES # BLD: 0 10E3/UL
IMM GRANULOCYTES NFR BLD: 0 %
KETONES UR STRIP-MCNC: NEGATIVE MG/DL
LEUKOCYTE ESTERASE UR QL STRIP: NEGATIVE
LYMPHOCYTES # BLD AUTO: 3.2 10E3/UL (ref 0.8–5.3)
LYMPHOCYTES NFR BLD AUTO: 38 %
MCH RBC QN AUTO: 29.1 PG (ref 26.5–33)
MCHC RBC AUTO-ENTMCNC: 33.7 G/DL (ref 31.5–36.5)
MCV RBC AUTO: 87 FL (ref 78–100)
MONOCYTES # BLD AUTO: 0.6 10E3/UL (ref 0–1.3)
MONOCYTES NFR BLD AUTO: 7 %
MUCOUS THREADS #/AREA URNS LPF: PRESENT /LPF
NEUTROPHILS # BLD AUTO: 4 10E3/UL (ref 1.6–8.3)
NEUTROPHILS NFR BLD AUTO: 49 %
NITRATE UR QL: NEGATIVE
NRBC # BLD AUTO: 0 10E3/UL
NRBC BLD AUTO-RTO: 0 /100
PH UR STRIP: 6 [PH] (ref 5–7)
PLATELET # BLD AUTO: 288 10E3/UL (ref 150–450)
RBC # BLD AUTO: 4.7 10E6/UL (ref 3.8–5.2)
RBC URINE: 1 /HPF
SP GR UR STRIP: 1.01 (ref 1–1.03)
SQUAMOUS EPITHELIAL: 1 /HPF
UROBILINOGEN UR STRIP-MCNC: <2 MG/DL
WBC # BLD AUTO: 8.3 10E3/UL (ref 4–11)
WBC URINE: 2 /HPF

## 2022-09-28 PROCEDURE — 85025 COMPLETE CBC W/AUTO DIFF WBC: CPT | Performed by: EMERGENCY MEDICINE

## 2022-09-28 PROCEDURE — 99285 EMERGENCY DEPT VISIT HI MDM: CPT | Mod: 25

## 2022-09-28 PROCEDURE — 86140 C-REACTIVE PROTEIN: CPT | Performed by: EMERGENCY MEDICINE

## 2022-09-28 PROCEDURE — 81025 URINE PREGNANCY TEST: CPT | Performed by: EMERGENCY MEDICINE

## 2022-09-28 PROCEDURE — 87210 SMEAR WET MOUNT SALINE/INK: CPT | Performed by: EMERGENCY MEDICINE

## 2022-09-28 PROCEDURE — 87491 CHLMYD TRACH DNA AMP PROBE: CPT | Performed by: EMERGENCY MEDICINE

## 2022-09-28 PROCEDURE — 36415 COLL VENOUS BLD VENIPUNCTURE: CPT | Performed by: EMERGENCY MEDICINE

## 2022-09-28 PROCEDURE — 87591 N.GONORRHOEAE DNA AMP PROB: CPT | Performed by: EMERGENCY MEDICINE

## 2022-09-28 PROCEDURE — 80048 BASIC METABOLIC PNL TOTAL CA: CPT | Performed by: EMERGENCY MEDICINE

## 2022-09-28 PROCEDURE — 81001 URINALYSIS AUTO W/SCOPE: CPT | Performed by: EMERGENCY MEDICINE

## 2022-09-28 PROCEDURE — 86780 TREPONEMA PALLIDUM: CPT | Performed by: EMERGENCY MEDICINE

## 2022-09-28 PROCEDURE — 87389 HIV-1 AG W/HIV-1&-2 AB AG IA: CPT | Performed by: EMERGENCY MEDICINE

## 2022-09-28 RX ORDER — KETOROLAC TROMETHAMINE 15 MG/ML
15 INJECTION, SOLUTION INTRAMUSCULAR; INTRAVENOUS ONCE
Status: COMPLETED | OUTPATIENT
Start: 2022-09-28 | End: 2022-09-29

## 2022-09-28 NOTE — Clinical Note
Robert Steele was seen and treated in our emergency department on 9/28/2022.  She may return to work on 10/03/2022.       If you have any questions or concerns, please don't hesitate to call.      Carolyne Stubbs MD

## 2022-09-29 ENCOUNTER — APPOINTMENT (OUTPATIENT)
Dept: CT IMAGING | Facility: CLINIC | Age: 22
End: 2022-09-29
Attending: EMERGENCY MEDICINE
Payer: COMMERCIAL

## 2022-09-29 LAB
ANION GAP SERPL CALCULATED.3IONS-SCNC: 7 MMOL/L (ref 5–18)
BUN SERPL-MCNC: 11 MG/DL (ref 8–22)
C REACTIVE PROTEIN LHE: 0.2 MG/DL (ref 0–?)
CALCIUM SERPL-MCNC: 8.7 MG/DL (ref 8.5–10.5)
CHLORIDE BLD-SCNC: 108 MMOL/L (ref 98–107)
CLUE CELLS: ABNORMAL
CO2 SERPL-SCNC: 23 MMOL/L (ref 22–31)
CREAT SERPL-MCNC: 0.71 MG/DL (ref 0.6–1.1)
GFR SERPL CREATININE-BSD FRML MDRD: >90 ML/MIN/1.73M2
GLUCOSE BLD-MCNC: 86 MG/DL (ref 70–125)
HIV 1+2 AB+HIV1 P24 AG SERPL QL IA: NONREACTIVE
POTASSIUM BLD-SCNC: 3.5 MMOL/L (ref 3.5–5)
SODIUM SERPL-SCNC: 138 MMOL/L (ref 136–145)
T PALLIDUM AB SER QL: NONREACTIVE
TRICHOMONAS, WET PREP: ABNORMAL
WBC'S/HIGH POWER FIELD, WET PREP: ABNORMAL
YEAST, WET PREP: ABNORMAL

## 2022-09-29 PROCEDURE — 250N000011 HC RX IP 250 OP 636: Performed by: EMERGENCY MEDICINE

## 2022-09-29 PROCEDURE — 96375 TX/PRO/DX INJ NEW DRUG ADDON: CPT

## 2022-09-29 PROCEDURE — 258N000003 HC RX IP 258 OP 636: Performed by: EMERGENCY MEDICINE

## 2022-09-29 PROCEDURE — 74177 CT ABD & PELVIS W/CONTRAST: CPT

## 2022-09-29 PROCEDURE — 250N000013 HC RX MED GY IP 250 OP 250 PS 637: Performed by: EMERGENCY MEDICINE

## 2022-09-29 PROCEDURE — 96374 THER/PROPH/DIAG INJ IV PUSH: CPT | Mod: 59

## 2022-09-29 RX ORDER — ONDANSETRON 4 MG/1
4-8 TABLET, ORALLY DISINTEGRATING ORAL EVERY 8 HOURS PRN
Qty: 10 TABLET | Refills: 0 | Status: SHIPPED | OUTPATIENT
Start: 2022-09-29 | End: 2022-10-02

## 2022-09-29 RX ORDER — IOPAMIDOL 755 MG/ML
100 INJECTION, SOLUTION INTRAVASCULAR ONCE
Status: COMPLETED | OUTPATIENT
Start: 2022-09-29 | End: 2022-09-29

## 2022-09-29 RX ORDER — CEFTRIAXONE 500 MG/1
500 INJECTION, POWDER, FOR SOLUTION INTRAMUSCULAR; INTRAVENOUS ONCE
Status: DISCONTINUED | OUTPATIENT
Start: 2022-09-29 | End: 2022-09-29 | Stop reason: CLARIF

## 2022-09-29 RX ORDER — DOXYCYCLINE 100 MG/1
100 CAPSULE ORAL 2 TIMES DAILY
Qty: 28 CAPSULE | Refills: 0 | Status: SHIPPED | OUTPATIENT
Start: 2022-09-29 | End: 2022-10-13

## 2022-09-29 RX ORDER — METRONIDAZOLE 500 MG/1
500 TABLET ORAL 2 TIMES DAILY
Qty: 28 TABLET | Refills: 0 | Status: SHIPPED | OUTPATIENT
Start: 2022-09-29 | End: 2022-10-13

## 2022-09-29 RX ORDER — METRONIDAZOLE 500 MG/1
500 TABLET ORAL ONCE
Status: COMPLETED | OUTPATIENT
Start: 2022-09-29 | End: 2022-09-29

## 2022-09-29 RX ORDER — DOXYCYCLINE 100 MG/1
100 CAPSULE ORAL ONCE
Status: COMPLETED | OUTPATIENT
Start: 2022-09-29 | End: 2022-09-29

## 2022-09-29 RX ADMIN — METRONIDAZOLE 500 MG: 500 TABLET ORAL at 02:54

## 2022-09-29 RX ADMIN — KETOROLAC TROMETHAMINE 15 MG: 15 INJECTION, SOLUTION INTRAMUSCULAR; INTRAVENOUS at 03:00

## 2022-09-29 RX ADMIN — IOPAMIDOL 100 ML: 755 INJECTION, SOLUTION INTRAVENOUS at 00:46

## 2022-09-29 RX ADMIN — CEFTRIAXONE SODIUM: 500 INJECTION, POWDER, FOR SOLUTION INTRAMUSCULAR; INTRAVENOUS at 02:53

## 2022-09-29 RX ADMIN — DOXYCYCLINE HYCLATE 100 MG: 100 CAPSULE ORAL at 02:54

## 2022-09-29 ASSESSMENT — ACTIVITIES OF DAILY LIVING (ADL)
ADLS_ACUITY_SCORE: 37
ADLS_ACUITY_SCORE: 37

## 2022-09-29 NOTE — ED NOTES
EMERGENCY DEPARTMENT SIGN OUT NOTE        ED COURSE AND MEDICAL DECISION MAKING  Patient was signed out to me by Dr Saloni Do at 12:30 AM    In brief, Robert Steele is a 22 year old female who initially presented abdominal pain and pelvic pain for the past 4 days. She also endorses nausea but no vomiting. Did initially go into urgent care but was referred to ED for further workup. No fever, vaginal discharge, nausea, vomiting, or diarrhea. No other medical complaints at this time.      At time of sign out, disposition was pending CT, wet prep. If anomaly on CT, then plan to consult OB/GYN.     1:50 AM Care discussed with Dr. Malhotra, OB/GYN.   1:54 AM Results were communicated with the patient. Discussed discharge plans along with return precautions. Patient was agreeable with plan.        FINAL IMPRESSION    1. Abdominal pain, generalized        ED MEDS  Medications   ketorolac (TORADOL) injection 15 mg (has no administration in time range)   cefTRIAXone (ROCEPHIN) 500 mg vial to attach to  ml bag for ADULTS or NS 50 ml bag for PEDS (has no administration in time range)   doxycycline hyclate (VIBRAMYCIN) capsule 100 mg (has no administration in time range)   metroNIDAZOLE (FLAGYL) tablet 500 mg (has no administration in time range)   iopamidol (ISOVUE-370) solution 100 mL (100 mLs Intravenous Given 9/29/22 0046)       LAB  Labs Ordered and Resulted from Time of ED Arrival to Time of ED Departure   BASIC METABOLIC PANEL - Abnormal       Result Value    Sodium 138      Potassium 3.5      Chloride 108 (*)     Carbon Dioxide (CO2) 23      Anion Gap 7      Urea Nitrogen 11      Creatinine 0.71      Calcium 8.7      Glucose 86      GFR Estimate >90     ROUTINE UA WITH MICROSCOPIC REFLEX TO CULTURE - Abnormal    Color Urine Colorless      Appearance Urine Clear      Glucose Urine Negative      Bilirubin Urine Negative      Ketones Urine Negative      Specific Gravity Urine 1.011      Blood Urine Negative       pH Urine 6.0      Protein Albumin Urine Negative      Urobilinogen Urine <2.0      Nitrite Urine Negative      Leukocyte Esterase Urine Negative      Mucus Urine Present (*)     RBC Urine 1      WBC Urine 2      Squamous Epithelials Urine 1     WET PREPARATION - Abnormal    Trichomonas Absent      Yeast Absent      Clue Cells Absent      WBCs/high power field 1+ (*)    HCG QUALITATIVE URINE - Normal    hCG Urine Qualitative Negative     CRP INFLAMMATION - Normal    CRP 0.2     CBC WITH PLATELETS AND DIFFERENTIAL    WBC Count 8.3      RBC Count 4.70      Hemoglobin 13.7      Hematocrit 40.7      MCV 87      MCH 29.1      MCHC 33.7      RDW 13.6      Platelet Count 288      % Neutrophils 49      % Lymphocytes 38      % Monocytes 7      % Eosinophils 5      % Basophils 1      % Immature Granulocytes 0      NRBCs per 100 WBC 0      Absolute Neutrophils 4.0      Absolute Lymphocytes 3.2      Absolute Monocytes 0.6      Absolute Eosinophils 0.4      Absolute Basophils 0.1      Absolute Immature Granulocytes 0.0      Absolute NRBCs 0.0     TREPONEMA ABS W REFLEX TO RPR AND TITER   HIV ANTIGEN ANTIBODY COMBO   CHLAMYDIA TRACHOMATIS PCR   NEISSERIA GONORRHOEAE PCR       RADIOLOGY    CT Abdomen Pelvis w Contrast   Final Result   IMPRESSION:    Right ovary mildly enlarged by a 2.5 cm complex cyst. Small amount of free fluid in the right adnexal region. Findings may be explained by a ruptured hemorrhagic cyst. Questionable mild right hydrosalpinx. Pelvic inflammatory disease is not excluded.    Clinical correlation recommended.          DISCHARGE MEDS  New Prescriptions    No medications on file       Carolyne Stubbs MD  [facility] EMERGENCY ROOM  [address] [address] [phone]

## 2022-09-29 NOTE — ED NOTES
Expected Patient Referral to ED  7:20 PM    Referring Clinic/Provider:  Dr. Dani Weiss ([facility])    Reason for referral/Clinical facts:  4 days pelvic pain  PID in 2018, this feels the same  Not sexually active in last 30 days  BV and candidiasis, GC/Chlam negative last month  Positive chandelier sign  HCG negative  Right tubular structure in adenxa on Right seen on US    Sending in for possible TOA, needs CT    Recommendations provided:  Send to ED for further evaluation    Caller was informed that this institution does possess the capabilities and/or resources to provide for patient and should be transferred to our facility.    Discussed that if direct admit is sought and any hurdles are encountered, this ED would be happy to see the patient and evaluate.    Informed caller that recommendations provided are recommendations based only on the facts provided and that they responsible to accept or reject the advice, or to seek a formal in person consultation as needed and that this ED will see/treat patient should they arrive.      Man Vega MD  [facility] EMERGENCY ROOM  [facility] [id number] [phone]     Man Vega MD  09/28/22 1923

## 2022-09-29 NOTE — ED TRIAGE NOTES
Pt presents to the ED with c/o worsening lower abdominal pain, pelvic pain, and cramping for the past 4 days. Pt sent from .

## 2022-09-29 NOTE — DISCHARGE INSTRUCTIONS
Ibuprofen 600 mg every 6 hours as needed for pain  Tylenol 650 mg every 4 hours as needed for pain  Follow-up with your GYN or primary care within the next 1 to 2 days for recheck

## 2022-09-29 NOTE — ED PROVIDER NOTES
"EMERGENCY DEPARTMENT ENCOUNTER      NAME: Robert Steele  AGE: 22 year old female  YOB: 2000  MRN: [id number]  EVALUATION DATE & TIME: No admission date for patient encounter.    PCP: No Ref-Primary, Physician    ED PROVIDER: Saloni Do M.D.      Chief Complaint   Patient presents with     Pelvic Pain     Abdominal Pain         FINAL IMPRESSION:  1. Abdominal pain, generalized          ED COURSE & MEDICAL DECISION MAKING:    ED Course as of 09/29/22 0003   Wed Sep 28, 2022   2047 Pt here with suprapubic region pain, LLQ was tender earlier at urgent care, with reading of RIGHT (other side) complex ovarian cyst and possible fallopian tube distention in setting of no current vaginal discharge or fever. She notes the pain however is similar to when she had PID in 2018 and she also recently was diagnosed with gonorrhea, chlamydia and trichomonas last year, recently treated for pelvic discharge and treated for both yeast vaginitis and bacterial vaginosis with nromalized vaginal discharge after her current treatments. Patient high risk overall and STI screening including for HIV and treponema pending, will repeat hcg with read of \"likely involuting corpora luteum cyst\" per ultrasound radiologist at urgent care, and CT abdomen to assess for signs of PID,will do pelvic examination here with wet prep, ketorolac for discomfort, patient ameanble to plan.   2356 Chaperoned pelvic exam without vaginal discharge, herpetic lesions bilateral vulva and she does note she has a history of herpes labialis, aware she is having outbreak, no cervical motion tenderness. WBC reassuringly wNL, wet prep and STI screening tests pending, patient to CT shortly. UA without cells to suggest UTI.   u Sep 29, 2022   0002 Patient signed out to PM ED MD pending CT abdomen and plan to clinically reassess, wet prep in process, and if anomaly on abdominal CT to discuss with ob/gyn at that time     8:40 PM I introduced myself to the " patient, obtained patient history, performed a physical exam, and discussed plan for ED workup including potential diagnostic laboratory/imaging studies and interventions.    Pertinent Labs & Imaging studies reviewed. (See chart for details)    N95 worn  A face shield was worn also  COVID PPE      At the conclusion of the encounter I discussed the results of all of the tests and the disposition. The questions were answered. The patient or family acknowledged understanding and was agreeable with the care plan.     MEDICATIONS GIVEN IN THE EMERGENCY:  Medications   ketorolac (TORADOL) injection 15 mg (has no administration in time range)       NEW PRESCRIPTIONS STARTED AT TODAY'S ER VISIT  New Prescriptions    No medications on file          =================================================================    HPI      Robert Steele is a 22 year old female with PMHx of STDS and pelvic inflammatory disease who presents to the ED today via walk in with abdominal and pelvic pain.     Per chart review, the patient was seen at [facility] on 9/12/2022  presenting for evaluation of sore throat and stomach pain. The patient reported having abnormal vaginal discharge, stomach and foul odor. Trichomonas, candida, and bacterial vaginosis by LÓPEZ showed positive for candida species. The patient was prescribed fluconazole (150 mg), and metroNIDAZOLE (500 mg). The patient was sent home and directed to follow up with primary care doctor.     Per chart review, the patient was seen at [facility] on 9/28/2022 presenting for evaluation of bilateral pelvic pain for 4 days. The patient reported having PID in the past and says the pain is similar to it. US PELVIS COMPLETE TA AND TV showed complex cystic lesion right ovary likely represents an involuting corpus luteum cyst. Tubular structure right pelvis may represent a distended fallopian tube. Consider further evaluation with CT. Small amount of free  "fluid in the right pelvis. Thickened endometrium, presumed physiologic. Pregnancy Urine test showed negative. Urinalysis w/ sediment exam showed abnormal specific gravity, urine. Patient was directed to the ED for further evaluation by Dr. Nikita Obrien.    The patient started having abdominal and pelvic pain 4 days ago and says her pelvis seems inflamed. She says the pain comes and goes and nothing makes it worse or better. She also feels nauseous. She went to urgent care today, but was referred to the ED. She notes that her pelvic pain is similar to when she had PID back in 2018.     She denies any fever, vaginal discharge, vomiting or diarrhea. She had a history of gonorrhea and chlamydia last year.      REVIEW OF SYSTEMS   All other systems reviewed and are negative except as noted above in HPI.    PAST MEDICAL HISTORY:  No past medical history on file.    PAST SURGICAL HISTORY:  No past surgical history on file.    CURRENT MEDICATIONS:    benzoyl peroxide 10 % BAR  clindamycin (CLEOCIN T) 1 % external lotion  hydrOXYzine (ATARAX) 25 MG tablet  sertraline (ZOLOFT) 50 MG tablet  tretinoin (RETIN-A) 0.05 % external cream        ALLERGIES:  No Known Allergies    FAMILY HISTORY:  No family history on file.    SOCIAL HISTORY:   Social History     Socioeconomic History     Marital status: Single   Tobacco Use     Smoking status: Current Every Day Smoker     Packs/day: 0.50     Types: Cigarettes     Smokeless tobacco: Never Used   Substance and Sexual Activity     Alcohol use: Yes     Drug use: Yes       VITALS:  Patient Vitals for the past 24 hrs:   BP Temp Temp src Pulse Resp SpO2 Height Weight   09/28/22 2043 129/82 98.9  F (37.2  C) Oral 84 18 99 % 1.753 m (5' 9\") 100.7 kg (222 lb)       PHYSICAL EXAM    GENERAL: Awake, alert.  In no acute distress.   HEENT: Normocephalic, atraumatic.  Pupils equal, round and reactive.  Conjunctiva normal.  EOMI.  NECK: No stridor or apparent deformity.  PULMONARY: Symmetrical " breath sounds without distress.  Lungs clear to auscultation bilaterally without wheezes, rhonchi or rales.  CARDIO: Regular rate and rhythm.  No significant murmur, rub or gallop.  Radial pulses strong and symmetrical.  ABDOMINAL: Abdomen soft, non-distended and non-tender to palpation.  No CVAT, no palpable hepatosplenomegaly.  EXTREMITIES: No lower extremity swelling or edema. Supra pubic region tender without rebound or guarding.    NEURO: Alert and oriented to person, place and time.  Cranial nerves grossly intact.  No focal motor deficit.  PSYCH: Normal mood and affect  SKIN: No rashes      LAB:  All pertinent labs reviewed and interpreted.  Results for orders placed or performed during the hospital encounter of 09/28/22   UA with Microscopic reflex to Culture    Specimen: Urine, NOS   Result Value Ref Range    Color Urine Colorless Colorless, Straw, Light Yellow, Yellow    Appearance Urine Clear Clear    Glucose Urine Negative Negative mg/dL    Bilirubin Urine Negative Negative    Ketones Urine Negative Negative mg/dL    Specific Gravity Urine 1.011 1.001 - 1.030    Blood Urine Negative Negative    pH Urine 6.0 5.0 - 7.0    Protein Albumin Urine Negative Negative mg/dL    Urobilinogen Urine <2.0 <2.0 mg/dL    Nitrite Urine Negative Negative    Leukocyte Esterase Urine Negative Negative    Mucus Urine Present (A) None Seen /LPF    RBC Urine 1 <=2 /HPF    WBC Urine 2 <=5 /HPF    Squamous Epithelials Urine 1 <=1 /HPF   HCG qualitative urine   Result Value Ref Range    hCG Urine Qualitative Negative Negative   CBC with platelets and differential   Result Value Ref Range    WBC Count 8.3 4.0 - 11.0 10e3/uL    RBC Count 4.70 3.80 - 5.20 10e6/uL    Hemoglobin 13.7 11.7 - 15.7 g/dL    Hematocrit 40.7 35.0 - 47.0 %    MCV 87 78 - 100 fL    MCH 29.1 26.5 - 33.0 pg    MCHC 33.7 31.5 - 36.5 g/dL    RDW 13.6 10.0 - 15.0 %    Platelet Count 288 150 - 450 10e3/uL    % Neutrophils 49 %    % Lymphocytes 38 %    % Monocytes 7  %    % Eosinophils 5 %    % Basophils 1 %    % Immature Granulocytes 0 %    NRBCs per 100 WBC 0 <1 /100    Absolute Neutrophils 4.0 1.6 - 8.3 10e3/uL    Absolute Lymphocytes 3.2 0.8 - 5.3 10e3/uL    Absolute Monocytes 0.6 0.0 - 1.3 10e3/uL    Absolute Eosinophils 0.4 0.0 - 0.7 10e3/uL    Absolute Basophils 0.1 0.0 - 0.2 10e3/uL    Absolute Immature Granulocytes 0.0 <=0.4 10e3/uL    Absolute NRBCs 0.0 10e3/uL       RADIOLOGY:  Reviewed all pertinent imaging. Please see official radiology report.  CT Abdomen Pelvis w Contrast    (Results Pending)               I, Yrn Segura, am serving as a scribe to document services personally performed by Dr. Saloni Do based on my observation and the provider's statements to me. I, Salnoi Do MD attest that Yrn Segura is acting in a scribe capacity, has observed my performance of the services and has documented them in accordance with my direction.     Saloni Do MD  09/29/22 0003

## 2022-09-30 LAB
C TRACH DNA SPEC QL NAA+PROBE: NEGATIVE
N GONORRHOEA DNA SPEC QL NAA+PROBE: NEGATIVE

## 2022-10-01 ENCOUNTER — HEALTH MAINTENANCE LETTER (OUTPATIENT)
Age: 22
End: 2022-10-01

## 2023-05-29 ENCOUNTER — APPOINTMENT (OUTPATIENT)
Dept: CT IMAGING | Facility: CLINIC | Age: 23
End: 2023-05-29
Attending: EMERGENCY MEDICINE
Payer: COMMERCIAL

## 2023-05-29 ENCOUNTER — HOSPITAL ENCOUNTER (INPATIENT)
Facility: CLINIC | Age: 23
LOS: 1 days | Discharge: OTHER INSTITUTION | End: 2023-05-30
Attending: EMERGENCY MEDICINE | Admitting: PSYCHIATRY & NEUROLOGY
Payer: COMMERCIAL

## 2023-05-29 DIAGNOSIS — F41.9 ANXIETY: ICD-10-CM

## 2023-05-29 DIAGNOSIS — N76.0 BACTERIAL VAGINOSIS: Primary | ICD-10-CM

## 2023-05-29 DIAGNOSIS — B96.89 BACTERIAL VAGINOSIS: Primary | ICD-10-CM

## 2023-05-29 DIAGNOSIS — F19.20 POLYSUBSTANCE DEPENDENCE (H): ICD-10-CM

## 2023-05-29 DIAGNOSIS — Z20.822 CONTACT WITH AND (SUSPECTED) EXPOSURE TO COVID-19: ICD-10-CM

## 2023-05-29 DIAGNOSIS — F19.10 POLYSUBSTANCE ABUSE (H): ICD-10-CM

## 2023-05-29 DIAGNOSIS — R10.30 LOWER ABDOMINAL PAIN: ICD-10-CM

## 2023-05-29 LAB
ALBUMIN SERPL BCG-MCNC: 3.7 G/DL (ref 3.5–5.2)
ALBUMIN UR-MCNC: 20 MG/DL
ALCOHOL BREATH TEST: 0 (ref 0–0.01)
ALP SERPL-CCNC: 99 U/L (ref 35–104)
ALT SERPL W P-5'-P-CCNC: 27 U/L (ref 10–35)
AMPHETAMINES UR QL SCN: ABNORMAL
ANION GAP SERPL CALCULATED.3IONS-SCNC: 10 MMOL/L (ref 7–15)
APPEARANCE UR: CLEAR
AST SERPL W P-5'-P-CCNC: 22 U/L (ref 10–35)
BARBITURATES UR QL SCN: ABNORMAL
BASOPHILS # BLD AUTO: 0.1 10E3/UL (ref 0–0.2)
BASOPHILS NFR BLD AUTO: 1 %
BENZODIAZ UR QL SCN: ABNORMAL
BILIRUB SERPL-MCNC: 0.2 MG/DL
BILIRUB UR QL STRIP: NEGATIVE
BUN SERPL-MCNC: 9.4 MG/DL (ref 6–20)
BZE UR QL SCN: ABNORMAL
C TRACH DNA SPEC QL PROBE+SIG AMP: NEGATIVE
CALCIUM SERPL-MCNC: 8.5 MG/DL (ref 8.6–10)
CANNABINOIDS UR QL SCN: ABNORMAL
CHLORIDE SERPL-SCNC: 108 MMOL/L (ref 98–107)
CLUE CELLS: PRESENT
COLOR UR AUTO: YELLOW
CREAT SERPL-MCNC: 0.81 MG/DL (ref 0.51–0.95)
DEPRECATED HCO3 PLAS-SCNC: 22 MMOL/L (ref 22–29)
EOSINOPHIL # BLD AUTO: 0.2 10E3/UL (ref 0–0.7)
EOSINOPHIL NFR BLD AUTO: 3 %
ERYTHROCYTE [DISTWIDTH] IN BLOOD BY AUTOMATED COUNT: 14.2 % (ref 10–15)
GFR SERPL CREATININE-BSD FRML MDRD: >90 ML/MIN/1.73M2
GGT SERPL-CCNC: 22 U/L (ref 5–36)
GLUCOSE SERPL-MCNC: 117 MG/DL (ref 70–99)
GLUCOSE UR STRIP-MCNC: NEGATIVE MG/DL
HBA1C MFR BLD: 5.3 %
HCG UR QL: NEGATIVE
HCT VFR BLD AUTO: 39.1 % (ref 35–47)
HGB BLD-MCNC: 12.8 G/DL (ref 11.7–15.7)
HGB UR QL STRIP: ABNORMAL
IMM GRANULOCYTES # BLD: 0 10E3/UL
IMM GRANULOCYTES NFR BLD: 0 %
KETONES UR STRIP-MCNC: ABNORMAL MG/DL
LEUKOCYTE ESTERASE UR QL STRIP: NEGATIVE
LIPASE SERPL-CCNC: 45 U/L (ref 13–60)
LYMPHOCYTES # BLD AUTO: 3.1 10E3/UL (ref 0.8–5.3)
LYMPHOCYTES NFR BLD AUTO: 39 %
MCH RBC QN AUTO: 29 PG (ref 26.5–33)
MCHC RBC AUTO-ENTMCNC: 32.7 G/DL (ref 31.5–36.5)
MCV RBC AUTO: 89 FL (ref 78–100)
MONOCYTES # BLD AUTO: 0.6 10E3/UL (ref 0–1.3)
MONOCYTES NFR BLD AUTO: 7 %
MUCOUS THREADS #/AREA URNS LPF: PRESENT /LPF
N GONORRHOEA DNA SPEC QL NAA+PROBE: NEGATIVE
NEUTROPHILS # BLD AUTO: 3.9 10E3/UL (ref 1.6–8.3)
NEUTROPHILS NFR BLD AUTO: 50 %
NITRATE UR QL: NEGATIVE
NRBC # BLD AUTO: 0 10E3/UL
NRBC BLD AUTO-RTO: 0 /100
OPIATES UR QL SCN: ABNORMAL
PH UR STRIP: 6 [PH] (ref 5–7)
PLATELET # BLD AUTO: 271 10E3/UL (ref 150–450)
POTASSIUM SERPL-SCNC: 3.7 MMOL/L (ref 3.4–5.3)
PROT SERPL-MCNC: 6.4 G/DL (ref 6.4–8.3)
RBC # BLD AUTO: 4.42 10E6/UL (ref 3.8–5.2)
RBC URINE: <1 /HPF
SARS-COV-2 RNA RESP QL NAA+PROBE: NEGATIVE
SODIUM SERPL-SCNC: 140 MMOL/L (ref 136–145)
SP GR UR STRIP: 1.03 (ref 1–1.03)
SQUAMOUS EPITHELIAL: 1 /HPF
TRICHOMONAS, WET PREP: ABNORMAL
TSH SERPL DL<=0.005 MIU/L-ACNC: 0.83 UIU/ML (ref 0.3–4.2)
UROBILINOGEN UR STRIP-MCNC: 2 MG/DL
WBC # BLD AUTO: 7.9 10E3/UL (ref 4–11)
WBC URINE: 3 /HPF
WBC'S/HIGH POWER FIELD, WET PREP: ABNORMAL
YEAST, WET PREP: ABNORMAL

## 2023-05-29 PROCEDURE — 74177 CT ABD & PELVIS W/CONTRAST: CPT

## 2023-05-29 PROCEDURE — 250N000013 HC RX MED GY IP 250 OP 250 PS 637: Performed by: EMERGENCY MEDICINE

## 2023-05-29 PROCEDURE — 80307 DRUG TEST PRSMV CHEM ANLYZR: CPT | Performed by: EMERGENCY MEDICINE

## 2023-05-29 PROCEDURE — 250N000011 HC RX IP 250 OP 636: Performed by: PSYCHIATRY & NEUROLOGY

## 2023-05-29 PROCEDURE — 99284 EMERGENCY DEPT VISIT MOD MDM: CPT | Performed by: EMERGENCY MEDICINE

## 2023-05-29 PROCEDURE — 99222 1ST HOSP IP/OBS MODERATE 55: CPT

## 2023-05-29 PROCEDURE — 84443 ASSAY THYROID STIM HORMONE: CPT | Performed by: PSYCHIATRY & NEUROLOGY

## 2023-05-29 PROCEDURE — 82075 ASSAY OF BREATH ETHANOL: CPT | Performed by: EMERGENCY MEDICINE

## 2023-05-29 PROCEDURE — HZ2ZZZZ DETOXIFICATION SERVICES FOR SUBSTANCE ABUSE TREATMENT: ICD-10-PCS | Performed by: PSYCHIATRY & NEUROLOGY

## 2023-05-29 PROCEDURE — 250N000013 HC RX MED GY IP 250 OP 250 PS 637

## 2023-05-29 PROCEDURE — 99223 1ST HOSP IP/OBS HIGH 75: CPT | Mod: AI | Performed by: PSYCHIATRY & NEUROLOGY

## 2023-05-29 PROCEDURE — 250N000009 HC RX 250: Performed by: EMERGENCY MEDICINE

## 2023-05-29 PROCEDURE — 87635 SARS-COV-2 COVID-19 AMP PRB: CPT | Performed by: EMERGENCY MEDICINE

## 2023-05-29 PROCEDURE — 250N000013 HC RX MED GY IP 250 OP 250 PS 637: Performed by: PSYCHIATRY & NEUROLOGY

## 2023-05-29 PROCEDURE — 87210 SMEAR WET MOUNT SALINE/INK: CPT | Performed by: EMERGENCY MEDICINE

## 2023-05-29 PROCEDURE — 80053 COMPREHEN METABOLIC PANEL: CPT | Performed by: EMERGENCY MEDICINE

## 2023-05-29 PROCEDURE — 83690 ASSAY OF LIPASE: CPT | Performed by: EMERGENCY MEDICINE

## 2023-05-29 PROCEDURE — 96361 HYDRATE IV INFUSION ADD-ON: CPT | Performed by: EMERGENCY MEDICINE

## 2023-05-29 PROCEDURE — 83036 HEMOGLOBIN GLYCOSYLATED A1C: CPT | Performed by: PSYCHIATRY & NEUROLOGY

## 2023-05-29 PROCEDURE — C9803 HOPD COVID-19 SPEC COLLECT: HCPCS | Performed by: EMERGENCY MEDICINE

## 2023-05-29 PROCEDURE — 99285 EMERGENCY DEPT VISIT HI MDM: CPT | Mod: 25 | Performed by: EMERGENCY MEDICINE

## 2023-05-29 PROCEDURE — 96360 HYDRATION IV INFUSION INIT: CPT | Mod: 59 | Performed by: EMERGENCY MEDICINE

## 2023-05-29 PROCEDURE — 258N000003 HC RX IP 258 OP 636: Performed by: EMERGENCY MEDICINE

## 2023-05-29 PROCEDURE — 87491 CHLMYD TRACH DNA AMP PROBE: CPT | Performed by: EMERGENCY MEDICINE

## 2023-05-29 PROCEDURE — 81001 URINALYSIS AUTO W/SCOPE: CPT | Performed by: EMERGENCY MEDICINE

## 2023-05-29 PROCEDURE — 81025 URINE PREGNANCY TEST: CPT | Performed by: EMERGENCY MEDICINE

## 2023-05-29 PROCEDURE — 250N000011 HC RX IP 250 OP 636: Performed by: EMERGENCY MEDICINE

## 2023-05-29 PROCEDURE — 85025 COMPLETE CBC W/AUTO DIFF WBC: CPT | Performed by: EMERGENCY MEDICINE

## 2023-05-29 PROCEDURE — 128N000004 HC R&B CD ADULT

## 2023-05-29 PROCEDURE — 36415 COLL VENOUS BLD VENIPUNCTURE: CPT | Performed by: EMERGENCY MEDICINE

## 2023-05-29 PROCEDURE — 82977 ASSAY OF GGT: CPT | Performed by: PSYCHIATRY & NEUROLOGY

## 2023-05-29 RX ORDER — HYDROXYZINE HYDROCHLORIDE 25 MG/1
25 TABLET, FILM COATED ORAL EVERY 6 HOURS PRN
Status: DISCONTINUED | OUTPATIENT
Start: 2023-05-29 | End: 2023-05-30 | Stop reason: HOSPADM

## 2023-05-29 RX ORDER — FLUTICASONE PROPIONATE 50 MCG
1 SPRAY, SUSPENSION (ML) NASAL DAILY
COMMUNITY

## 2023-05-29 RX ORDER — GLYCOPYRROLATE 1 MG/1
1 TABLET ORAL 3 TIMES DAILY
COMMUNITY

## 2023-05-29 RX ORDER — DIAZEPAM 5 MG
5-20 TABLET ORAL EVERY 30 MIN PRN
Status: DISCONTINUED | OUTPATIENT
Start: 2023-05-29 | End: 2023-05-29

## 2023-05-29 RX ORDER — SODIUM CHLORIDE 9 MG/ML
INJECTION, SOLUTION INTRAVENOUS CONTINUOUS
Status: DISCONTINUED | OUTPATIENT
Start: 2023-05-29 | End: 2023-05-29

## 2023-05-29 RX ORDER — MAGNESIUM HYDROXIDE/ALUMINUM HYDROXICE/SIMETHICONE 120; 1200; 1200 MG/30ML; MG/30ML; MG/30ML
30 SUSPENSION ORAL EVERY 4 HOURS PRN
Status: DISCONTINUED | OUTPATIENT
Start: 2023-05-29 | End: 2023-05-30 | Stop reason: HOSPADM

## 2023-05-29 RX ORDER — IOPAMIDOL 755 MG/ML
100 INJECTION, SOLUTION INTRAVASCULAR ONCE
Status: COMPLETED | OUTPATIENT
Start: 2023-05-29 | End: 2023-05-29

## 2023-05-29 RX ORDER — FLUOXETINE 40 MG/1
40 CAPSULE ORAL DAILY
COMMUNITY

## 2023-05-29 RX ORDER — ATENOLOL 50 MG/1
50 TABLET ORAL DAILY PRN
Status: DISCONTINUED | OUTPATIENT
Start: 2023-05-29 | End: 2023-05-30 | Stop reason: HOSPADM

## 2023-05-29 RX ORDER — TRAZODONE HYDROCHLORIDE 50 MG/1
50 TABLET, FILM COATED ORAL
Status: DISCONTINUED | OUTPATIENT
Start: 2023-05-29 | End: 2023-05-30 | Stop reason: HOSPADM

## 2023-05-29 RX ORDER — AMOXICILLIN 250 MG
1 CAPSULE ORAL 2 TIMES DAILY PRN
Status: DISCONTINUED | OUTPATIENT
Start: 2023-05-29 | End: 2023-05-30 | Stop reason: HOSPADM

## 2023-05-29 RX ORDER — LOPERAMIDE HCL 2 MG
2 CAPSULE ORAL 4 TIMES DAILY PRN
Status: DISCONTINUED | OUTPATIENT
Start: 2023-05-29 | End: 2023-05-30 | Stop reason: HOSPADM

## 2023-05-29 RX ORDER — MINOCYCLINE HYDROCHLORIDE 100 MG/1
100 CAPSULE ORAL 2 TIMES DAILY
COMMUNITY

## 2023-05-29 RX ORDER — IBUPROFEN 400 MG/1
400 TABLET, FILM COATED ORAL EVERY 6 HOURS PRN
Status: DISCONTINUED | OUTPATIENT
Start: 2023-05-29 | End: 2023-05-30 | Stop reason: HOSPADM

## 2023-05-29 RX ORDER — SIMETHICONE 80 MG
80 TABLET,CHEWABLE ORAL EVERY 6 HOURS PRN
Status: DISCONTINUED | OUTPATIENT
Start: 2023-05-29 | End: 2023-05-30 | Stop reason: HOSPADM

## 2023-05-29 RX ORDER — ONDANSETRON 4 MG/1
4 TABLET, ORALLY DISINTEGRATING ORAL EVERY 6 HOURS PRN
Status: DISCONTINUED | OUTPATIENT
Start: 2023-05-29 | End: 2023-05-30 | Stop reason: HOSPADM

## 2023-05-29 RX ORDER — FOLIC ACID 1 MG/1
1 TABLET ORAL DAILY
Status: DISCONTINUED | OUTPATIENT
Start: 2023-05-29 | End: 2023-05-30 | Stop reason: HOSPADM

## 2023-05-29 RX ORDER — MULTIPLE VITAMINS W/ MINERALS TAB 9MG-400MCG
1 TAB ORAL DAILY
Status: DISCONTINUED | OUTPATIENT
Start: 2023-05-29 | End: 2023-05-30 | Stop reason: HOSPADM

## 2023-05-29 RX ORDER — ACETAMINOPHEN 325 MG/1
650 TABLET ORAL EVERY 4 HOURS PRN
Status: DISCONTINUED | OUTPATIENT
Start: 2023-05-29 | End: 2023-05-30 | Stop reason: HOSPADM

## 2023-05-29 RX ORDER — ALBUTEROL SULFATE 90 UG/1
2 AEROSOL, METERED RESPIRATORY (INHALATION) EVERY 6 HOURS PRN
COMMUNITY

## 2023-05-29 RX ORDER — TRETINOIN 1 MG/G
CREAM TOPICAL AT BEDTIME
COMMUNITY

## 2023-05-29 RX ORDER — METRONIDAZOLE 500 MG/1
500 TABLET ORAL 2 TIMES DAILY
Status: DISCONTINUED | OUTPATIENT
Start: 2023-05-29 | End: 2023-05-30 | Stop reason: HOSPADM

## 2023-05-29 RX ORDER — DIAZEPAM 5 MG
5-20 TABLET ORAL EVERY 30 MIN PRN
Status: DISCONTINUED | OUTPATIENT
Start: 2023-05-29 | End: 2023-05-30

## 2023-05-29 RX ORDER — BENZOYL PEROXIDE 50 MG/ML
LIQUID TOPICAL EVERY MORNING
COMMUNITY

## 2023-05-29 RX ADMIN — FLUOXETINE 40 MG: 20 CAPSULE ORAL at 12:23

## 2023-05-29 RX ADMIN — SODIUM CHLORIDE 1000 ML: 9 INJECTION, SOLUTION INTRAVENOUS at 02:01

## 2023-05-29 RX ADMIN — METRONIDAZOLE 500 MG: 500 TABLET ORAL at 20:01

## 2023-05-29 RX ADMIN — IBUPROFEN 400 MG: 400 TABLET ORAL at 12:23

## 2023-05-29 RX ADMIN — ONDANSETRON 4 MG: 4 TABLET, ORALLY DISINTEGRATING ORAL at 18:25

## 2023-05-29 RX ADMIN — METRONIDAZOLE 500 MG: 500 TABLET ORAL at 12:43

## 2023-05-29 RX ADMIN — FOLIC ACID 1 MG: 1 TABLET ORAL at 12:23

## 2023-05-29 RX ADMIN — SODIUM CHLORIDE 67 ML: 9 INJECTION, SOLUTION INTRAVENOUS at 04:01

## 2023-05-29 RX ADMIN — HYDROXYZINE HYDROCHLORIDE 25 MG: 25 TABLET, FILM COATED ORAL at 12:23

## 2023-05-29 RX ADMIN — THIAMINE HCL TAB 100 MG 100 MG: 100 TAB at 12:23

## 2023-05-29 RX ADMIN — IOPAMIDOL 100 ML: 755 INJECTION, SOLUTION INTRAVENOUS at 03:59

## 2023-05-29 RX ADMIN — NICOTINE POLACRILEX 4 MG: 4 GUM, CHEWING BUCCAL at 10:22

## 2023-05-29 RX ADMIN — NICOTINE POLACRILEX 4 MG: 2 GUM, CHEWING BUCCAL at 18:02

## 2023-05-29 RX ADMIN — HYDROXYZINE HYDROCHLORIDE 25 MG: 25 TABLET, FILM COATED ORAL at 20:01

## 2023-05-29 RX ADMIN — MULTIPLE VITAMINS W/ MINERALS TAB 1 TABLET: TAB at 12:23

## 2023-05-29 ASSESSMENT — ACTIVITIES OF DAILY LIVING (ADL)
HYGIENE/GROOMING: INDEPENDENT
DRESS: INDEPENDENT
ADLS_ACUITY_SCORE: 40
ADLS_ACUITY_SCORE: 37
ORAL_HYGIENE: INDEPENDENT
ADLS_ACUITY_SCORE: 37
ADLS_ACUITY_SCORE: 37
ADLS_ACUITY_SCORE: 40
ADLS_ACUITY_SCORE: 37
LAUNDRY: WITH SUPERVISION
ADLS_ACUITY_SCORE: 40
ADLS_ACUITY_SCORE: 40

## 2023-05-29 ASSESSMENT — LIFESTYLE VARIABLES
SKIP TO QUESTIONS 9-10: 0
AUDIT-C TOTAL SCORE: 9

## 2023-05-29 NOTE — CONSULTS
[facility]  Internal Medicine Consult     Robert Steele MRN# [id number]   Age: 23 year old YOB: 2000     Date of Admission: 5/29/2023  Date of Consult: 5/29/2023    Primary Care Provider: No Ref-Primary, Physician    Requesting Service: Behavioral Health - James Henriquez MD  Reason for Consult: General Medical Evaluation      SUBJECTIVE   CC:   Abdominal pain    Assessment and Plan/Recommendations:     Robert Steele is a 23 year old woman with a history of alcohol abuse, anxiety, depression, pelvic inflammatory disease. Pt was admitted on 5/29/2023 to [facility] for medical management of withdrawal from alcohol.    #Alcohol withdrawal, hx of alcohol use disorder   Drinking over a pint of alcohol daily. Also endorses marijuana use.   -MSSA 6 this shift. Denies hx of withdrawal seizures.   -continue MSSA   -folvite, multi-vites, thiamine supplementation   -further management per Psychiatry     #Bacterial vaginosis  C/o lower abdominal and flank pain and fishy/foul odor from vaginal area. Denies vaginal discharge or pruritis. Wet prep + for clue cells.   -metronidazole 500 mg bid for 7 days  -ibuprofen 400 mg every 6 hours PRN pain    #Elevated blood sugar  Blood sugar 117 on admission. Hb A1c 5.3. Likely stress related.   -continue to monitor    #Anxiety  #Depression  -further management per psychiatry    Currently, medically stable and internal medicine will sign off. Please contact if future questions or concerns arise. Thank you for the opportunity to be a part of this patient's care.      DENIS Rivera CNP  Internal Medicine [facility] Hospitalist  Page job code [phone]), [phone] (3A), or [phone] ([address])  Text paging via [employer] is appreciated  May 29, 2023         HPI:   Robert Steele is a 23 year old woman with a history of alcohol abuse, anxiety, depression, pelvic inflammatory disease. Pt was admitted on 5/29/2023 to [facility] for medical management of withdrawal  "from alcohol.  Drinking over a pint of alcohol daily. Also endorses marijuana use.   Discussed abdominal pain, vaginal odor, clue cells. Pt has been treated for BV in the past. She verbalized understanding that she cannot consume alcohol when on the metronidazole. Pt very tearful and sad about admission and having to stay on a locked unit. Reassured her if all goes well this does not have to be a long stay and she can continue going forward with plans for placement.   Denies shortness of breath, chest pain, dyspnea, lightheadedness, dizziness, numbness, tingling. Denies n/v/d, constipation, abdominal pain. Denies joint pain, swelling, increased warmth. Denies feelings of anxiety or depression.          Past Medical History:     Past Medical History:   Diagnosis Date     Anxiety      Depression         Reviewed and updated in [facility].     Past Surgical History:    History reviewed. No pertinent surgical history.      Social History:     Social History     Tobacco Use     Smoking status: Every Day     Packs/day: 0.50     Types: Cigarettes     Smokeless tobacco: Never   Substance Use Topics     Alcohol use: Yes     Drug use: Yes        Family History:   No family history on file.      Allergies:   No Known Allergies      Medications:   Reviewed. Please see MAR     Review of Systems:   10 point ROS of systems including Constitutional, Eyes, Respiratory, Cardiovascular, Gastroenterology, Genitourinary, Integumentary, Muscularskeletal, Psychiatric were all negative except for pertinent positives noted in my HPI.    OBJECTIVE   Physical Exam:   Vitals were reviewed  Blood pressure (!) 147/101, pulse 75, temperature 97.6  F (36.4  C), temperature source Temporal, resp. rate 16, height 1.753 m (5' 9\"), weight 102.7 kg (226 lb 6.6 oz), last menstrual period 02/01/2023, SpO2 100 %.  General: Alert and oriented x3, mild distress, tearful  HEENT: Anicteric sclera, MMM  Cardiovascular: RRR, S1S2. No murmur noted  Lungs: CTAB without " wheezing or crackles   GI: Abdomen soft, non-tender with bowel sounds present. No guarding or rebound   Vascular: No peripheral edema, distal pulses palpable  Neurologic: No focal deficits, CN II-XII grossly intact  Skin: No jaundice, rashes, or lesions        Data:        Lab Results   Component Value Date     2023     2021    Lab Results   Component Value Date    CHLORIDE 108 2023    CHLORIDE 108 2022    CHLORIDE 108 2021    Lab Results   Component Value Date    BUN 9.4 2023    BUN 11 2022    BUN 7 2021      Lab Results   Component Value Date    POTASSIUM 3.7 2023    POTASSIUM 3.5 2022    POTASSIUM 3.6 2021    Lab Results   Component Value Date    CO2 22 2023    CO2 23 2022    CO2 20 2021    Lab Results   Component Value Date    CR 0.81 2023    CR 0.82 2021        Lab Results   Component Value Date    WBC 7.9 2023    HGB 12.8 2023    HCT 39.1 2023    MCV 89 2023     2023     Lab Results   Component Value Date    WBC 7.9 2023           Medical Decision Makin MINUTES SPENT BY ME on the date of service doing chart review, history, exam, documentation & further activities per the note.

## 2023-05-29 NOTE — PROGRESS NOTES
"Triage & Transition Services, [address]     Robert Steele  May 29, 2023    PLAN: Pending. Patient interested in residential treatment. Patient states they've completed an assessment, however this writer does not see a Comprehensive [facility] Assessment available at this time for the patient in Epic. This writer will attempt to obtain this tomorrow morning if available.      Wadr is currently admitted to [address]. Please see H&P for complete assessment information and therapist Progress Notes for additional clinical details.      worked with DOYLE Davis regarding patient's care today.     Met with patient to coordinate details of aftercare planning in-person. Patient stated they wanted to discuss discharging detox and participating in residential treatment where they can also still work while completing the program. This writer noted that often residential treatment programs do not allow working during their programs, but that I would attempt to find programs where it is possible.     The patient stated they were currently in the process of being evicted and have court via Zoom for this Wednesday 5/31/2023 at 9:30 am. They stated they need to call around 9:00 am that day for the meeting ID number for Zoom. The patient stated they are waiting to hear back from the [facility] about Emergency Housing Assistance and so they would need to check their phone periodically for this. This writer noted this is fine and discussed 's phone policy at this time.     The patient mentioned they are interested in [facility] and were recommended this by family who \"knew some of the providers there\" and thought they would be a good location for the patient.     This writer offered to the patient that I would check to see if there were county-based supports for information on evictions and the process in their county.     Healthcare funding: [facility]. Barriers to care related to funding? None.     Clinical care team and " client have agreed on an aftercare plan that includes the following level of care at discharge: Pending Assessment dimensions/new assessment if needed. Patient indicates interest in residential at [facility].     Referrals offered to patient: None at this time.     Patient's current living situation is at home with Mom and the patient states they plan to participate in residential treatment and hopes to prevent themselves from getting evicted to return there after residential.     Patient's current engagement with county/employer supports are N/A.     Patient's current connections with peer recovery supports are N/A.     Patient's current connections with spiritual or culturally-specific supports are N/A.     Natalie Reid  Triage & Transition Services - Mental Health and Addiction Service Line  [facility] - Adult Inpatient Addiction Psychiatry Unit

## 2023-05-29 NOTE — PROGRESS NOTES
"[facility] Admission Note    S = Situation:   Robert Steele is a 23 year old year old female with a chief complaint of Alcohol Problem    Voluntary admission to [facility] for alcohol withdrawal and detox. She expressed wanting help but was hesitant to come onto the unit and was tearful. After talking with staff pt agreed to enter the unit and sign in. She has no history of SZ or DT. She reports abdominal pain 5/10. She expressed wanting to discharge tomorrow for court Wednesday morning for eviction. She said if she was hospitalized she would need her phone at 9:00AM Wednesday to obtain a zoom link to attend court at 9:30AM.    B  = Background:   Substance use history: alcohol     First start age 15       Amount over pint      How often: daily     Last use 5/28 1300  Smokes cigarettes 0.5 PPD. Uses marijuana   Mental health history: MDD history of hospitalization for 3 suicide attempts. Last was overdose with medications and alcohol in 2021  Medical history: PID  Legal history: voluntary  Treatment history: 3 IP hospitalizations for MH. Hx of tx for mental health  Living situation: home  Recent life stressors: Court Wednesday about eviction and obtaining rent assistance      A  =  Assessment:   During admission interview, pt affect was tearful and anxious.   MSSA 3  BP (!) 147/101   Pulse 75   Temp 97.6  F (36.4  C) (Temporal)   Resp 16   Ht 1.753 m (5' 9\")   Wt 102.7 kg (226 lb 6.6 oz)   LMP 02/01/2023 (Approximate)   SpO2 100%   BMI 33.44 kg/m       R =   Request or Recommendation:   alcohol withdrawal will be monitored and treated using MSSA with valium.  Pt will meet with psychiatry, internal medicine, and case management tomorrow.  At the time of admission, pt reports discharge goal is residential. She expressed wanting to work during this.    "

## 2023-05-29 NOTE — H&P
"Psychiatry History and Physical    Robert Steele MRN# [id number]   Age: 23 year old YOB: 2000     Date of Admission:  5/29/2023          Assessment:   This patient is a 23 year old female with history of alcohol use, depression and anxiety who presented to ED seeking detox from alcohol as well as reporting abdominal pain. Medically cleared in ED including CT of abdomen and UA/Wet Prep completed. EtOH breath 0.00. UDS positive for cannabinoids.  Drinking over 1 pint of alcohol per day. No history of seizures or DTs. Admitted to [facility] as voluntary patient. PTA medications continued including fluoxetine for mood. Reports mood has been \"Very anxious\" due to stressors, some depression, denies SI. IM consult placed for medical co-management. Admission labs ordered, labs in ED reviewed. Pt reports goals for hospitalization are complete detox and get help with referrals for CD treatment. She has a court hearing for an eviction on Wednesday and hoping to discharge home tomorrow.      Inpatient psychiatric hospitalization is warranted at this time for safety, stabilization, and possible adjustment in medications.         Diagnoses:     Alcohol use disorder, moderate, with withdrawal   Cannabis use, unspecified   Nicotine use, unspecified   Major Depressive Disorder, recurrent, moderate  PENNY  Hypocalcemia  Abdominal pain  Wet prep with + clue cells, pt now reporting odor and discharge  Elevated BP, no hx of HTN  Elevated BG         Plan:   Psychiatric treatment/inteventions:  Medications:   -MSSA with diazepam, thiamine, folic acid, multivitamin and PRN atenolol for alcohol withdrawal   -continue PTA fluoxetine 40mg daily for depression and anxiety   -continue PTA PRN hydroxyzine 25mg every 6 hours for anxiety   -PRN trazodone 50mg at bedtime for sleep   -will order nicotine replacement while on unit     -pt interested in residential CD treatment, wanting referrals, has concerns for a court hearing re: " eviction on Wednesday and hoping to discharge home 5/30 in order to prepare for this    Laboratory/Imaging: reviewed: EtOH breath test 0.00; UDS positive for cannabinoids; Urine HCG negative; Covid negative; UA without evidence of infection; Wet prep with clue cells; CBC WNL; Lipase 45 WNL; CMP with Cl 108(H), calcium 8.5(L), glucose 117(H) otherwise WNL; GGT, TSH, Lipid panel and HgbA1c ordered to complete admission labs    EXAM: CT ABDOMEN PELVIS W CONTRAST  LOCATION: [facility]  DATE/TIME: 5/29/2023 3:51 AM CDT     INDICATION: RLQ pain  COMPARISON: 09/29/2022  TECHNIQUE: CT scan of the abdomen and pelvis was performed following injection of IV contrast. Multiplanar reformats were obtained. Dose reduction techniques were used.  CONTRAST: 100mL's iso 370                                                                   IMPRESSION:   1.  Normal.    Patient will be treated in therapeutic milieu with appropriate individual and group therapies as described.    Medical treatment/interventions:  Medical concerns:   1) Alcohol withdrawal, complicated by elevated BPs  -MSSA as above  -PRN zofran and imodium for GI distress related to withdrawal   -withdrawal precautions    2) IM consult placed for management of other medical concerns, including abdominal pain and wet prep with + clue cells, pt now reporting some  sx of vaginal odor and discharge, notified IM, also ordered Gas X PRN for bloating pain, per consult note on 5/29/23:   Robert Steele is a 23 year old woman with a history of alcohol abuse, anxiety, depression, pelvic inflammatory disease. Pt was admitted on 5/29/2023 to  for medical management of withdrawal from alcohol.     #Alcohol withdrawal, hx of alcohol use disorder   Drinking over a pint of alcohol daily. Also endorses marijuana use.   -MSSA 6 this shift. Denies hx of withdrawal seizures.   -continue MSSA   -folvite, multi-vites, thiamine  supplementation   -further management per Psychiatry      #Bacterial vaginosis  C/o lower abdominal and flank pain and fishy/foul odor from vaginal area. Denies vaginal discharge or pruritis. Wet prep + for clue cells.   -metronidazole 500 mg bid for 7 days  -ibuprofen 400 mg every 6 hours PRN pain     #Elevated blood sugar  Blood sugar 117 on admission. Hb A1c 5.3. Likely stress related.   -continue to monitor     #Anxiety  #Depression  -further management per psychiatry     Currently, medically stable and internal medicine will sign off. Please contact if future questions or concerns arise. Thank you for the opportunity to be a part of this patient's care.        DEINS Rivera CNP  Internal Medicine [facility] Hospitalist      Legal Status: Voluntary    Safety Assessment:   Behavioral Orders   Procedures     Code 1 - Restrict to Unit     Routine Programming     As clinically indicated     Status 15     Every 15 minutes.     Withdrawal precautions      Pt has not required locked seclusion or restraints in the past 24 hours to maintain safety, please refer to RN documentation for further details.    The risks, benefits, alternatives and side effects have been discussed and are understood by the patient.    Disposition: Pending clinical stabilization. Will likely discharge to home with plan to engage in residential CD treatment when stable.    This note was created by rom using a Dragon dictation system. All typing errors or contextual distortion are unintentional and software inherent.     Natalie Davis, [facility] Psychiatry         Chief Complaint:     Alcohol withdrawal          History of Present Illness:     Ward is a 23 year old female with history of alcohol use, depression and anxiety who presented to ED seeking detox from alcohol as well as reporting abdominal pain.     Chart review completed including ED notes, outpatient notes including primary care, derm, urgent care and  behavioral health and previous psychiatric admission.     Per ED physician note: Robert Steele is a 23 year old female who with PMH of MDD, PID, suicidal behavior and tobacco dependence presents to the ED with alcoholic problem.   Patient reported that he drinks over a pint of alcohol a day.  Her last drink was at 1 PM today. She also does marijuana as well. She also reported that she has lower abdominal pain and feels like it is inflammation as that she has had previously pelvic inflammatory disease.  Patient is sexually active and she gets her self periodically checked for STDs.  She is not having any vaginal discharge currently.  No history of urinary discomfort.  No history of seizures.  No history of delirium tremens.  No SI or HI.  No visual problems.    Upon interview: Patient reports in the information above, reports that she has been having increasing alcohol use over the past 6 months, drinking a pint of alcohol a day.  Denies any history of seizures or DTs.  Reports that she uses marijuana daily along with nicotine.  With her alcohol use she does have some tolerance, difficulty cutting down, drinking more than intended, and withdrawal symptoms when she stops drinking.  Reports that she has been having some nausea, increased anxiety and GI distress.  She has never been in CD treatment, she is hoping to get resources and referrals.  She has been having increasing anxiety, identifies several stressors in her life including living with her mother who is behind on rent and they are facing eviction.  Reports there is a court hearing on Wednesday.  She may go and live with other family in the [place] area, has been in an apartment and [place].  Is feeling overwhelmed with the stressors, was tearful when discussing this, is concerned that being admitted will add to stressors that she will be unable to follow up on issues such as her court hearing but does agree that having assistance with an  evaluation and referral for CD treatment will help her anxiety overall.  She has been having more depression, low energy, amotivation, sleeping more than usual, eating more than usual, having some weight gain.  She denies having any thoughts of harming self or others.  Denies any SIB.  Denies having any psychosis symptoms.  Denies any meg symptoms.  Does have history of psychiatric admission and suicide attempts.  She has been prescribed fluoxetine but states that she has not been taking it as regularly as she should be, does miss doses fairly often.  Outside of her withdrawal symptoms, reports she has some bloating and abdominal pain, aware of the work-up that was done in the ED.  Would like additional medications to help with bloating.  She reports also having some vaginal odor and discharge when discussing the results of her wet prep.  Writer shared that they will defer to internal medicine regarding treatment of her symptoms.  With some reassurance she was agreeable to voluntary admission to 3 a with the hope of being discharged tomorrow in order to prepare for court hearing on Wednesday.              Psychiatric Review of Systems:   Depression: see HPI  Meg:   Reports: none  Denies: sleeplessness, impulsiveness, racing thoughts, increased goal-directed activities, pressured speech, increase in energy  Psychosis:   Reports: none  Denies: visual hallucinations, auditory hallucinations, paranoia, grandiosity, ideas of reference, thought insertions, thought broadcasting.  Anxiety: see HPI  PTSD:   Reports: none.  Denies: re-experiencing past trauma, nightmares, increased arousal, avoidance of traumatic stimuli, impaired function.  OCD:   Reports: none  Denies: obsessions, checking, symmetry, cleaning, skin picking.  ED:   Reports: over eating  Denies: restriction, binging, purging, excessive exercise, laxative abuse           Medical Review of Systems:     10 point review of systems is otherwise negative unless  noted above.            Psychiatric History:   Psychiatric Hospitalizations: yes, at [facility] in [place] 2021; has also been in [facility]  History of Psychosis: none  Prior ECT: none  Court Commitment: none  Suicide Attempts: admitted in 2021 following overdose on sertraline, hydroxyzine and drinking 2 beers, also reported 1 past attempt via electrocution per chart   Self-injurious Behavior: none  Violence toward others: none  Use of Psychotropics: Prozac, Zoloft, Hydroxyzine          Substance Use History:   Alcohol: See HPI   Cannabis: see HPI, daily use  Nicotine:  cigarettes  Cocaine: none  Methamphetamine: none  Opiates/Heroin: none  Benzodiazepines: none  Hallucinogens: none  Inhalants: none    Denies hx of IVDU.    Prior Chemical Dependency treatment: none          Social History:   Upbringing: grew up in [place]  Educational History:   Relationships: single  Children: none  Current Living Situation: lives with mother in [place], facing eviction, has applied for emergency assistance  Occupational History: phlebotomist, enjoys job  Financial Support: limited, working  Legal History:  None outside of court hearing for eviction   Abuse/Trauma History: none         Family History:     H/o completed suicides in family: none  Mental Health- none   CD- none  No family history on file.          Past Medical History:     Past Medical History:   Diagnosis Date     Anxiety      Depression        History of seizures: none  History of Head Trauma and/or loss of consciousness: none         Past Surgical History:   History reviewed. No pertinent surgical history.  -no recent surgery          Allergies:    No Known Allergies           Medications:   I have reviewed this patient's current medications  Medications Prior to Admission   Medication Sig Dispense Refill Last Dose     albuterol (PROAIR HFA/PROVENTIL HFA/VENTOLIN HFA) 108 (90 Base) MCG/ACT inhaler Inhale 2 puffs into the lungs every 6 hours as needed for shortness of breath,  wheezing or cough        benzoyl peroxide (BENZOYL PEROXIDE WASH) 5 % external liquid Apply topically every morning Wash affected areas on groin/thighs/face        FLUoxetine (PROZAC) 40 MG capsule Take 40 mg by mouth daily        fluticasone (FLONASE) 50 MCG/ACT nasal spray Spray 1 spray into both nostrils daily        glycopyrrolate (ROBINUL) 1 MG tablet Take 1 mg by mouth 3 times daily        minocycline (MINOCIN) 100 MG capsule Take 100 mg by mouth 2 times daily        tretinoin (RETIN-A) 0.1 % external cream Apply topically At Bedtime        clindamycin (CLEOCIN T) 1 % external lotion Apply 1 g topically daily as needed (Apply to the face, armpits, groin, thighs)                Labs:     Recent Results (from the past 24 hour(s))   Alcohol breath test POCT    Collection Time: 05/29/23 12:23 AM   Result Value Ref Range    Alcohol Breath Test 0.0 0.00 - 0.01   UA with Microscopic reflex to Culture    Collection Time: 05/29/23  1:27 AM    Specimen: Urine, Clean Catch   Result Value Ref Range    Color Urine Yellow Colorless, Straw, Light Yellow, Yellow    Appearance Urine Clear Clear    Glucose Urine Negative Negative mg/dL    Bilirubin Urine Negative Negative    Ketones Urine Trace (A) Negative mg/dL    Specific Gravity Urine 1.028 1.003 - 1.035    Blood Urine Small (A) Negative    pH Urine 6.0 5.0 - 7.0    Protein Albumin Urine 20 (A) Negative mg/dL    Urobilinogen Urine 2.0 Normal, 2.0 mg/dL    Nitrite Urine Negative Negative    Leukocyte Esterase Urine Negative Negative    Mucus Urine Present (A) None Seen /LPF    RBC Urine <1 <=2 /HPF    WBC Urine 3 <=5 /HPF    Squamous Epithelials Urine 1 <=1 /HPF   HCG qualitative urine (UPT)    Collection Time: 05/29/23  1:27 AM   Result Value Ref Range    hCG Urine Qualitative Negative Negative   Chlamydia trachomatis/Neisseria gonorrhoeae by PCR    Collection Time: 05/29/23  1:27 AM    Specimen: Urine, Voided   Result Value Ref Range    Chlamydia Trachomatis Negative  Negative    Neisseria gonorrhoeae Negative Negative   Drug abuse screen 1 urine (ED)    Collection Time: 05/29/23  1:27 AM   Result Value Ref Range    Amphetamines Urine Screen Negative Screen Negative    Barbituates Urine Screen Negative Screen Negative    Benzodiazepine Urine Screen Negative Screen Negative    Cannabinoids Urine Screen Positive (A) Screen Negative    Cocaine Urine Screen Negative Screen Negative    Opiates Urine Screen Negative Screen Negative   Asymptomatic COVID-19 Virus (Coronavirus) by PCR Nose    Collection Time: 05/29/23  1:34 AM    Specimen: Nose; Swab   Result Value Ref Range    SARS CoV2 PCR Negative Negative   Comprehensive metabolic panel    Collection Time: 05/29/23  1:35 AM   Result Value Ref Range    Sodium 140 136 - 145 mmol/L    Potassium 3.7 3.4 - 5.3 mmol/L    Chloride 108 (H) 98 - 107 mmol/L    Carbon Dioxide (CO2) 22 22 - 29 mmol/L    Anion Gap 10 7 - 15 mmol/L    Urea Nitrogen 9.4 6.0 - 20.0 mg/dL    Creatinine 0.81 0.51 - 0.95 mg/dL    Calcium 8.5 (L) 8.6 - 10.0 mg/dL    Glucose 117 (H) 70 - 99 mg/dL    Alkaline Phosphatase 99 35 - 104 U/L    AST 22 10 - 35 U/L    ALT 27 10 - 35 U/L    Protein Total 6.4 6.4 - 8.3 g/dL    Albumin 3.7 3.5 - 5.2 g/dL    Bilirubin Total 0.2 <=1.2 mg/dL    GFR Estimate >90 >60 mL/min/1.73m2   Lipase    Collection Time: 05/29/23  1:35 AM   Result Value Ref Range    Lipase 45 13 - 60 U/L   CBC with platelets and differential    Collection Time: 05/29/23  1:35 AM   Result Value Ref Range    WBC Count 7.9 4.0 - 11.0 10e3/uL    RBC Count 4.42 3.80 - 5.20 10e6/uL    Hemoglobin 12.8 11.7 - 15.7 g/dL    Hematocrit 39.1 35.0 - 47.0 %    MCV 89 78 - 100 fL    MCH 29.0 26.5 - 33.0 pg    MCHC 32.7 31.5 - 36.5 g/dL    RDW 14.2 10.0 - 15.0 %    Platelet Count 271 150 - 450 10e3/uL    % Neutrophils 50 %    % Lymphocytes 39 %    % Monocytes 7 %    % Eosinophils 3 %    % Basophils 1 %    % Immature Granulocytes 0 %    NRBCs per 100 WBC 0 <1 /100    Absolute  "Neutrophils 3.9 1.6 - 8.3 10e3/uL    Absolute Lymphocytes 3.1 0.8 - 5.3 10e3/uL    Absolute Monocytes 0.6 0.0 - 1.3 10e3/uL    Absolute Eosinophils 0.2 0.0 - 0.7 10e3/uL    Absolute Basophils 0.1 0.0 - 0.2 10e3/uL    Absolute Immature Granulocytes 0.0 <=0.4 10e3/uL    Absolute NRBCs 0.0 10e3/uL   GGT    Collection Time: 05/29/23  1:35 AM   Result Value Ref Range    GGT 22 5 - 36 U/L   TSH with free T4 reflex    Collection Time: 05/29/23  1:35 AM   Result Value Ref Range    TSH 0.83 0.30 - 4.20 uIU/mL   Hemoglobin A1c    Collection Time: 05/29/23  1:35 AM   Result Value Ref Range    Hemoglobin A1C 5.3 <5.7 %   Wet prep    Collection Time: 05/29/23  2:02 AM    Specimen: Vagina; Swab   Result Value Ref Range    Trichomonas Absent Absent    Yeast Absent Absent    Clue Cells Present (A) Absent    WBCs/high power field None None       BP (!) 147/101   Pulse 75   Temp 97.6  F (36.4  C) (Temporal)   Resp 16   Ht 1.753 m (5' 9\")   Wt 102.7 kg (226 lb 6.6 oz)   LMP 02/01/2023 (Approximate)   SpO2 100%   BMI 33.44 kg/m    Weight is 226 lbs 6.6 oz  Body mass index is 33.44 kg/m .         Psychiatric Mental Status Examination:   Appearance: awake, alert, casually dressed  Attitude: cooperative   Eye Contact: fair  Mood:  \"very anxious\"  Affect: mood congruent and full range, tearful on occasion  Speech:  clear, coherent and normal prosody  Language: fluent in English  Psychomotor Behavior:  no evidence of tardive dyskinesia, dystonia, or tics  Gait/Station: normal  Thought Process: a bit tangential, logical  Associations:  no loose associations  Thought Content:  Denying SI/HI/AVH; no evidence of psychotic thinking  Insight:  fair  Judgement: intact  Oriented to:  time, person, and place  Attention Span and Concentration:  intact  Recent and Remote Memory:  intact  Fund of Knowledge: appropriate         Physical Exam:   Please refer to physical exam completed by ED provider, Sha Osman MD, on 5/29/23 as below. I agree " with the findings and assessment and have no additional findings to add at this time with exception that psychiatric findings now above in MSE.     Physical Exam   Constitutional: oriented to person, place, and time. appears well-developed and well-nourished.   HENT:   Head: Normocephalic and atraumatic.   Neck: Normal range of motion.   Pulmonary/Chest: Effort normal. No respiratory distress.   Cardiac: No murmurs, rubs, gallops. RRR.  Abdominal: Abdomen soft, nontender, nondistended. No rebound tenderness.  MSK: Long bones without deformity or evidence of trauma  Neurological: alert and oriented to person, place, and time. Stable gait.  No tongue fasciculations.  No tremor.  Skin: Skin is warm and dry.   Psychiatric:  normal mood and affect.  behavior is normal. Thought content normal.

## 2023-05-29 NOTE — PHARMACY-ADMISSION MEDICATION HISTORY
Pharmacist Admission Medication History    Admission medication history is complete. The information provided in this note is only as accurate as the sources available at the time of the update.    Medication reconciliation/reorder completed by provider prior to medication history? yes    Information Source(s): patient and fill history via in-person    Pertinent Information:     Patient's report consistent with fill records, all refills up to date.    Recently prescribed glycopyrrolate, minocycline, and topical acne products, which she states she does not need while she's here.      Allergies reviewed with patient and updates made in EHR: no    Prior to Admission medications    Medication Sig Last Dose       albuterol (PROAIR HFA/PROVENTIL HFA/VENTOLIN HFA) 108 (90 Base) MCG/ACT inhaler Inhale 2 puffs into the lungs every 6 hours as needed for shortness of breath, wheezing or cough     Past Week       benzoyl peroxide (BENZOYL PEROXIDE WASH) 5 % external liquid     Apply topically every morning Wash affected areas on groin/thighs/face Past Week       clindamycin (CLEOCIN T) 1 % external lotion Apply 1 g topically daily as needed (Apply to the face, armpits, groin, thighs)     Past Week       FLUoxetine (PROZAC) 40 MG capsule     Take 40 mg by mouth daily Past Week       fluticasone (FLONASE) 50 MCG/ACT nasal spray     Spray 1 spray into both nostrils daily Past Week       glycopyrrolate (ROBINUL) 1 MG tablet     Take 1 mg by mouth 3 times daily new Rx       minocycline (MINOCIN) 100 MG capsule     Take 100 mg by mouth 2 times daily for acne new Rx       tretinoin (RETIN-A) 0.1 % external cream     Apply topically At Bedtime           Joyce Fredkove, Pharm.D., [facility]  Behavioral Health Inpatient Pharmacist  [facility] ([facility])  Phone: *[phone] ([employer]) or [phone]

## 2023-05-29 NOTE — PLAN OF CARE
"Goal Outcome Evaluation:    Plan of Care Reviewed With: patient          Pt was admitted at 12pm and had MSSA of 3. She began being scored at 0531 and has not received withdrawal medications. Pt has good oral intake. She spent the afternoon resting in bed. She endorses depression and anxiety and received PRN hydroxyzine. She denies SI, SIB, HI, and AVH. She completed her paperwork and does not have a PCP. She endorses pain 5/10 and received PRN ibuprofen.    BP (!) 147/101   Pulse 75   Temp 97.6  F (36.4  C) (Temporal)   Resp 16   Ht 1.753 m (5' 9\")   Wt 102.7 kg (226 lb 6.6 oz)   LMP 02/01/2023 (Approximate)   SpO2 100%   BMI 33.44 kg/m              "

## 2023-05-29 NOTE — ED TRIAGE NOTES
Patient presents tonight for help withdrawing from alcohol. Denies any other substance use today. Last drink around 1200 on 5/28.      Triage Assessment     Row Name 05/29/23 0008       Triage Assessment (Adult)    Airway WDL WDL       Respiratory WDL    Respiratory WDL WDL       Skin Circulation/Temperature WDL    Skin Circulation/Temperature WDL WDL       Cardiac WDL    Cardiac WDL WDL       Peripheral/Neurovascular WDL    Peripheral Neurovascular WDL WDL       Cognitive/Neuro/Behavioral WDL    Cognitive/Neuro/Behavioral WDL WDL

## 2023-05-29 NOTE — PROGRESS NOTES
05/29/23 1201   Patient Belongings   Did you bring any home meds/supplements to the hospital?  No   Patient Belongings other (see comments)   Patient Belongings Put in Hospital Secure Location (Security or Locker, etc.) other (see comments)   Belongings Search Yes   Clothing Search Yes   Second Staff Deepa HAUSER     Backpack, bag, hoodie in storage  Cell, 2 batteries, 2 cords, wallet in med room  Keys, cigs, ear buds in sharps  2 [place] DL, Visa, [employer], EBT $2 cash in security  A               Admission:  I am responsible for any personal items that are not sent to the safe or pharmacy.  [facility] is not responsible for loss, theft or damage of any property in my possession.    Signature:  _________________________________ Date: _______  Time: _____                                              Staff Signature:  ____________________________ Date: ________  Time: _____      2nd Staff person, if patient is unable/unwilling to sign:    Signature: ________________________________ Date: ________  Time: _____     Discharge:  [facility] has returned all of my personal belongings:    Signature: _________________________________ Date: ________  Time: _____                                          Staff Signature:  ____________________________ Date: ________  Time: _____

## 2023-05-29 NOTE — ED PROVIDER NOTES
[facility] [place] ([facility])    5/29/23         History     Chief Complaint   Patient presents with     Alcohol Problem     The history is provided by the patient and medical records. No  was used.     Robert Steele is a 23 year old female who with PMH of MDD, PID, suicidal behavior and tobacco dependence presents to the ED with alcoholic problem.   Patient reported that he drinks over a pint of alcohol a day.  Her last drink was at 1 PM today. She also does marijuana as well. She also reported that she has lower abdominal pain and feels like it is inflammation as that she has had previously pelvic inflammatory disease.  Patient is sexually active and she gets her self periodically checked for STDs.  She is not having any vaginal discharge currently.  No history of urinary discomfort.  No history of seizures.  No history of delirium tremens.  No SI or HI.  No visual problems.    Past Medical History  Past Medical History:   Diagnosis Date     Anxiety      Depression      History reviewed. No pertinent surgical history.  benzoyl peroxide 10 % BAR  clindamycin (CLEOCIN T) 1 % external lotion  hydrOXYzine (ATARAX) 25 MG tablet  sertraline (ZOLOFT) 50 MG tablet  tretinoin (RETIN-A) 0.05 % external cream      No Known Allergies  Family History  No family history on file.  Social History   Social History     Tobacco Use     Smoking status: Every Day     Packs/day: 0.50     Types: Cigarettes     Smokeless tobacco: Never   Substance Use Topics     Alcohol use: Yes     Drug use: Yes      Past medical history, past surgical history, medications, allergies, family history, and social history were reviewed with the patient. No additional pertinent items.      A complete review of systems was performed with pertinent positives and negatives noted in the HPI, and all other systems negative.    Physical Exam   BP: 123/76  Pulse: 115  Temp: 98.3  F (36.8  C)  Resp: 18  SpO2: 96  %  Physical Exam  Physical Exam   Constitutional: oriented to person, place, and time. appears well-developed and well-nourished.   HENT:   Head: Normocephalic and atraumatic.   Neck: Normal range of motion.   Pulmonary/Chest: Effort normal. No respiratory distress.   Cardiac: No murmurs, rubs, gallops. RRR.  Abdominal: Abdomen soft, nontender, nondistended. No rebound tenderness.  MSK: Long bones without deformity or evidence of trauma  Neurological: alert and oriented to person, place, and time. Stable gait.  No tongue fasciculations.  No tremor.  Skin: Skin is warm and dry.   Psychiatric:  normal mood and affect.  behavior is normal. Thought content normal.       ED Course, Procedures, & Data     12:42 AM  The patient was seen and examined by Sha Osman MD.  in [facility]   Procedures            Results for orders placed or performed during the hospital encounter of 05/29/23   Alcohol breath test POCT     Status: Normal   Result Value Ref Range    Alcohol Breath Test 0.0 0.00 - 0.01     Medications - No data to display  Labs Ordered and Resulted from Time of ED Arrival to Time of ED Departure - No data to display  No orders to display          Critical care was not performed.     Medical Decision Making  The patient's presentation was of high complexity (an acute health issue posing potential threat to life or bodily function).    The patient's evaluation involved:  ordering and/or review of 3+ test(s) in this encounter (see separate area of note for details)    The patient's management necessitated high risk (a decision regarding hospitalization).      Assessment & Plan    MDM  Patient presenting with request for detox.  There is a bed that we will hold for her, will likely admit to detox after medical work-up.    She does have abdominal pain is over the lower abdomen.  We will get a CT to assess for evidence of abscess, diverticulitis, appendicitis, bowel obstruction or other acute intra-abdominal  pathology.  She does not have any symptoms or signs of PID, TOA or gynecologic infection.  Wet prep does have some clue cells however will not treat at this point as she is asymptomatic despite having some lower abdominal pain.    Re eval: Patient's labs and CT are reassuring.  No evidence of infection, obstruction.  Urinalysis is negative.  Patient at this point is medically clear for detox.  She is still willing to go.  She knows she is to stay for 3 days and that her belongings will get the secured    I have reviewed the nursing notes. I have reviewed the findings, diagnosis, plan and need for follow up with the patient.    New Prescriptions    No medications on file       Final diagnoses:   Polysubstance dependence (H)   I, SHAWNA PLUMMER, am serving as a trained medical scribe to document services personally performed by Sha Osman MD, based on the provider's statements to me.     Sha SANDOVAL MD, was physically present and have reviewed and verified the accuracy of this note documented by SHAWNA PLUMMER.    Sha Osman MD.   [facility] EMERGENCY DEPARTMENT  5/29/2023     Sha Osman MD  05/29/23 0417

## 2023-05-30 VITALS
TEMPERATURE: 97.7 F | HEIGHT: 69 IN | SYSTOLIC BLOOD PRESSURE: 123 MMHG | OXYGEN SATURATION: 99 % | WEIGHT: 226.41 LBS | DIASTOLIC BLOOD PRESSURE: 82 MMHG | RESPIRATION RATE: 16 BRPM | BODY MASS INDEX: 33.53 KG/M2 | HEART RATE: 67 BPM

## 2023-05-30 LAB
CHOLEST SERPL-MCNC: 160 MG/DL
HDLC SERPL-MCNC: 60 MG/DL
LDLC SERPL CALC-MCNC: 87 MG/DL
NONHDLC SERPL-MCNC: 100 MG/DL
TRIGL SERPL-MCNC: 63 MG/DL

## 2023-05-30 PROCEDURE — 36415 COLL VENOUS BLD VENIPUNCTURE: CPT | Performed by: PSYCHIATRY & NEUROLOGY

## 2023-05-30 PROCEDURE — 80061 LIPID PANEL: CPT | Performed by: PSYCHIATRY & NEUROLOGY

## 2023-05-30 PROCEDURE — 250N000013 HC RX MED GY IP 250 OP 250 PS 637

## 2023-05-30 PROCEDURE — 99239 HOSP IP/OBS DSCHRG MGMT >30: CPT | Performed by: PSYCHIATRY & NEUROLOGY

## 2023-05-30 PROCEDURE — 250N000013 HC RX MED GY IP 250 OP 250 PS 637: Performed by: PSYCHIATRY & NEUROLOGY

## 2023-05-30 RX ORDER — HYDROXYZINE HYDROCHLORIDE 25 MG/1
25 TABLET, FILM COATED ORAL EVERY 6 HOURS PRN
Qty: 120 TABLET | Refills: 0 | Status: SHIPPED | OUTPATIENT
Start: 2023-05-30

## 2023-05-30 RX ORDER — METRONIDAZOLE 500 MG/1
500 TABLET ORAL 2 TIMES DAILY
Qty: 11 TABLET | Refills: 0 | Status: SHIPPED | OUTPATIENT
Start: 2023-05-30

## 2023-05-30 RX ADMIN — NICOTINE POLACRILEX 2 MG: 2 GUM, CHEWING BUCCAL at 10:39

## 2023-05-30 RX ADMIN — HYDROXYZINE HYDROCHLORIDE 25 MG: 25 TABLET, FILM COATED ORAL at 08:42

## 2023-05-30 RX ADMIN — THIAMINE HCL TAB 100 MG 100 MG: 100 TAB at 08:42

## 2023-05-30 RX ADMIN — FLUOXETINE 40 MG: 20 CAPSULE ORAL at 08:42

## 2023-05-30 RX ADMIN — FOLIC ACID 1 MG: 1 TABLET ORAL at 08:42

## 2023-05-30 RX ADMIN — NICOTINE POLACRILEX 2 MG: 2 GUM, CHEWING BUCCAL at 13:13

## 2023-05-30 RX ADMIN — METRONIDAZOLE 500 MG: 500 TABLET ORAL at 09:18

## 2023-05-30 RX ADMIN — MULTIPLE VITAMINS W/ MINERALS TAB 1 TABLET: TAB at 08:42

## 2023-05-30 ASSESSMENT — ACTIVITIES OF DAILY LIVING (ADL)
ADLS_ACUITY_SCORE: 40
ORAL_HYGIENE: INDEPENDENT
ADLS_ACUITY_SCORE: 40
DRESS: INDEPENDENT
LAUNDRY: WITH SUPERVISION
ADLS_ACUITY_SCORE: 40
HYGIENE/GROOMING: INDEPENDENT

## 2023-05-30 ASSESSMENT — ANXIETY QUESTIONNAIRES
5. BEING SO RESTLESS THAT IT IS HARD TO SIT STILL: NEARLY EVERY DAY
4. TROUBLE RELAXING: NEARLY EVERY DAY
IF YOU CHECKED OFF ANY PROBLEMS ON THIS QUESTIONNAIRE, HOW DIFFICULT HAVE THESE PROBLEMS MADE IT FOR YOU TO DO YOUR WORK, TAKE CARE OF THINGS AT HOME, OR GET ALONG WITH OTHER PEOPLE: SOMEWHAT DIFFICULT
1. FEELING NERVOUS, ANXIOUS, OR ON EDGE: NEARLY EVERY DAY
6. BECOMING EASILY ANNOYED OR IRRITABLE: NEARLY EVERY DAY
GAD7 TOTAL SCORE: 21
GAD7 TOTAL SCORE: 21
7. FEELING AFRAID AS IF SOMETHING AWFUL MIGHT HAPPEN: NEARLY EVERY DAY
3. WORRYING TOO MUCH ABOUT DIFFERENT THINGS: NEARLY EVERY DAY
2. NOT BEING ABLE TO STOP OR CONTROL WORRYING: NEARLY EVERY DAY

## 2023-05-30 ASSESSMENT — PATIENT HEALTH QUESTIONNAIRE - PHQ9: SUM OF ALL RESPONSES TO PHQ QUESTIONS 1-9: 24

## 2023-05-30 NOTE — PROGRESS NOTES
Triage & Transition Services, [address]     Robert Steele  May 30, 2023    PLAN: Discharge home and then attend residential treatment after court date for housing.     Ward is currently admitted to [address]. Please see H&P for complete assessment information and therapist Progress Notes for additional clinical details.      worked with DOYLE Davis MS regarding patient's care today.     Met with patient to coordinate details of aftercare planning in-person. Patient stated they wanted to discuss completing assessment and sending referrals for residential treatment. Writer completed assessment and sent over referrals for residential treatment at [facility]. Writer instructed patient and treatment programs to call to schedule an appointment for intake.      Healthcare funding: [facility]. Barriers to care related to funding? None Steps taken toward reducing barriers to care: None    Clinical care team and client have agreed on an aftercare plan that includes the following level of care at discharge: residential treatment.       Referrals offered to patient: [facility]       SUHAS ABBOTT  Triage & Transition Services - Mental Health and Addiction Service Line  [address] - Adult Inpatient Addiction Psychiatry Unit

## 2023-05-30 NOTE — DISCHARGE SUMMARY
Psychiatric Discharge Summary    Robert Steele MRN# [id number]   Age: 23 year old YOB: 2000     Date of Admission:  5/29/2023  Date of Discharge:  5/30/2023  1:50 PM  Admitting Physician:  James Henriquez MD  Discharge Physician:  Natalie Davis DO           Event Leading to Hospitalization:   Ward is a 23 year old female with history of alcohol use, depression and anxiety who presented to ED seeking detox from alcohol as well as reporting abdominal pain.      Chart review completed including ED notes, outpatient notes including primary care, derm, urgent care and behavioral health and previous psychiatric admission.      Per ED physician note: Robert Steele is a 23 year old female who with PMH of MDD, PID, suicidal behavior and tobacco dependence presents to the ED with alcoholic problem.   Patient reported that he drinks over a pint of alcohol a day.  Her last drink was at 1 PM today. She also does marijuana as well. She also reported that she has lower abdominal pain and feels like it is inflammation as that she has had previously pelvic inflammatory disease.  Patient is sexually active and she gets her self periodically checked for STDs.  She is not having any vaginal discharge currently.  No history of urinary discomfort.  No history of seizures.  No history of delirium tremens.  No SI or HI.  No visual problems.     Upon interview: Patient reports in the information above, reports that she has been having increasing alcohol use over the past 6 months, drinking a pint of alcohol a day.  Denies any history of seizures or DTs.  Reports that she uses marijuana daily along with nicotine.  With her alcohol use she does have some tolerance, difficulty cutting down, drinking more than intended, and withdrawal symptoms when she stops drinking.  Reports that she has been having some nausea, increased anxiety and GI distress.  She has never been in CD treatment, she is hoping to get  resources and referrals.  She has been having increasing anxiety, identifies several stressors in her life including living with her mother who is behind on rent and they are facing eviction.  Reports there is a court hearing on Wednesday.  She may go and live with other family in the [place] area, has been in an apartment and [place].  Is feeling overwhelmed with the stressors, was tearful when discussing this, is concerned that being admitted will add to stressors that she will be unable to follow up on issues such as her court hearing but does agree that having assistance with an evaluation and referral for CD treatment will help her anxiety overall.  She has been having more depression, low energy, amotivation, sleeping more than usual, eating more than usual, having some weight gain.  She denies having any thoughts of harming self or others.  Denies any SIB.  Denies having any psychosis symptoms.  Denies any isai symptoms.  Does have history of psychiatric admission and suicide attempts.  She has been prescribed fluoxetine but states that she has not been taking it as regularly as she should be, does miss doses fairly often.  Outside of her withdrawal symptoms, reports she has some bloating and abdominal pain, aware of the work-up that was done in the ED.  Would like additional medications to help with bloating.  She reports also having some vaginal odor and discharge when discussing the results of her wet prep.  Writer shared that they will defer to internal medicine regarding treatment of her symptoms.  With some reassurance she was agreeable to voluntary admission to 3 a with the hope of being discharged tomorrow in order to prepare for court hearing on Wednesday.       See Admission note by Natalie Davis DO on 5/29/23 for additional details.          DIagnoses:     Alcohol use disorder, moderate, with withdrawal   Cannabis use, unspecified   Nicotine use, unspecified   Major Depressive  Disorder, recurrent, moderate  PENNY  Hypocalcemia  Abdominal pain  Wet prep with + clue cells, pt now reporting odor and discharge  Elevated BP, no hx of HTN  Elevated BG         Labs:     Recent Results (from the past 168 hour(s))   Alcohol breath test POCT    Collection Time: 05/29/23 12:23 AM   Result Value Ref Range    Alcohol Breath Test 0.0 0.00 - 0.01   UA with Microscopic reflex to Culture    Collection Time: 05/29/23  1:27 AM    Specimen: Urine, Clean Catch   Result Value Ref Range    Color Urine Yellow Colorless, Straw, Light Yellow, Yellow    Appearance Urine Clear Clear    Glucose Urine Negative Negative mg/dL    Bilirubin Urine Negative Negative    Ketones Urine Trace (A) Negative mg/dL    Specific Gravity Urine 1.028 1.003 - 1.035    Blood Urine Small (A) Negative    pH Urine 6.0 5.0 - 7.0    Protein Albumin Urine 20 (A) Negative mg/dL    Urobilinogen Urine 2.0 Normal, 2.0 mg/dL    Nitrite Urine Negative Negative    Leukocyte Esterase Urine Negative Negative    Mucus Urine Present (A) None Seen /LPF    RBC Urine <1 <=2 /HPF    WBC Urine 3 <=5 /HPF    Squamous Epithelials Urine 1 <=1 /HPF   HCG qualitative urine (UPT)    Collection Time: 05/29/23  1:27 AM   Result Value Ref Range    hCG Urine Qualitative Negative Negative   Chlamydia trachomatis/Neisseria gonorrhoeae by PCR    Collection Time: 05/29/23  1:27 AM    Specimen: Urine, Voided   Result Value Ref Range    Chlamydia Trachomatis Negative Negative    Neisseria gonorrhoeae Negative Negative   Drug abuse screen 1 urine (ED)    Collection Time: 05/29/23  1:27 AM   Result Value Ref Range    Amphetamines Urine Screen Negative Screen Negative    Barbituates Urine Screen Negative Screen Negative    Benzodiazepine Urine Screen Negative Screen Negative    Cannabinoids Urine Screen Positive (A) Screen Negative    Cocaine Urine Screen Negative Screen Negative    Opiates Urine Screen Negative Screen Negative   Asymptomatic COVID-19 Virus (Coronavirus) by PCR  Nose    Collection Time: 05/29/23  1:34 AM    Specimen: Nose; Swab   Result Value Ref Range    SARS CoV2 PCR Negative Negative   Comprehensive metabolic panel    Collection Time: 05/29/23  1:35 AM   Result Value Ref Range    Sodium 140 136 - 145 mmol/L    Potassium 3.7 3.4 - 5.3 mmol/L    Chloride 108 (H) 98 - 107 mmol/L    Carbon Dioxide (CO2) 22 22 - 29 mmol/L    Anion Gap 10 7 - 15 mmol/L    Urea Nitrogen 9.4 6.0 - 20.0 mg/dL    Creatinine 0.81 0.51 - 0.95 mg/dL    Calcium 8.5 (L) 8.6 - 10.0 mg/dL    Glucose 117 (H) 70 - 99 mg/dL    Alkaline Phosphatase 99 35 - 104 U/L    AST 22 10 - 35 U/L    ALT 27 10 - 35 U/L    Protein Total 6.4 6.4 - 8.3 g/dL    Albumin 3.7 3.5 - 5.2 g/dL    Bilirubin Total 0.2 <=1.2 mg/dL    GFR Estimate >90 >60 mL/min/1.73m2   Lipase    Collection Time: 05/29/23  1:35 AM   Result Value Ref Range    Lipase 45 13 - 60 U/L   CBC with platelets and differential    Collection Time: 05/29/23  1:35 AM   Result Value Ref Range    WBC Count 7.9 4.0 - 11.0 10e3/uL    RBC Count 4.42 3.80 - 5.20 10e6/uL    Hemoglobin 12.8 11.7 - 15.7 g/dL    Hematocrit 39.1 35.0 - 47.0 %    MCV 89 78 - 100 fL    MCH 29.0 26.5 - 33.0 pg    MCHC 32.7 31.5 - 36.5 g/dL    RDW 14.2 10.0 - 15.0 %    Platelet Count 271 150 - 450 10e3/uL    % Neutrophils 50 %    % Lymphocytes 39 %    % Monocytes 7 %    % Eosinophils 3 %    % Basophils 1 %    % Immature Granulocytes 0 %    NRBCs per 100 WBC 0 <1 /100    Absolute Neutrophils 3.9 1.6 - 8.3 10e3/uL    Absolute Lymphocytes 3.1 0.8 - 5.3 10e3/uL    Absolute Monocytes 0.6 0.0 - 1.3 10e3/uL    Absolute Eosinophils 0.2 0.0 - 0.7 10e3/uL    Absolute Basophils 0.1 0.0 - 0.2 10e3/uL    Absolute Immature Granulocytes 0.0 <=0.4 10e3/uL    Absolute NRBCs 0.0 10e3/uL   GGT    Collection Time: 05/29/23  1:35 AM   Result Value Ref Range    GGT 22 5 - 36 U/L   TSH with free T4 reflex    Collection Time: 05/29/23  1:35 AM   Result Value Ref Range    TSH 0.83 0.30 - 4.20 uIU/mL   Hemoglobin A1c     Collection Time: 05/29/23  1:35 AM   Result Value Ref Range    Hemoglobin A1C 5.3 <5.7 %   Wet prep    Collection Time: 05/29/23  2:02 AM    Specimen: Vagina; Swab   Result Value Ref Range    Trichomonas Absent Absent    Yeast Absent Absent    Clue Cells Present (A) Absent    WBCs/high power field None None   Lipid panel reflex to direct LDL    Collection Time: 05/30/23  6:47 AM   Result Value Ref Range    Cholesterol 160 <200 mg/dL    Triglycerides 63 <150 mg/dL    Direct Measure HDL 60 >=50 mg/dL    LDL Cholesterol Calculated 87 <=100 mg/dL    Non HDL Cholesterol 100 <130 mg/dL            Consults:   IM consult placed for management of other medical concerns, including abdominal pain and wet prep with + clue cells, pt now reporting some  sx of vaginal odor and discharge, notified IM, also ordered Gas X PRN for bloating pain, per consult note on 5/29/23:   Robert Steele is a 23 year old woman with a history of alcohol abuse, anxiety, depression, pelvic inflammatory disease. Pt was admitted on 5/29/2023 to  for medical management of withdrawal from alcohol.     #Alcohol withdrawal, hx of alcohol use disorder   Drinking over a pint of alcohol daily. Also endorses marijuana use.   -MSSA 6 this shift. Denies hx of withdrawal seizures.   -continue MSSA   -folvite, multi-vites, thiamine supplementation   -further management per Psychiatry      #Bacterial vaginosis  C/o lower abdominal and flank pain and fishy/foul odor from vaginal area. Denies vaginal discharge or pruritis. Wet prep + for clue cells.   -metronidazole 500 mg bid for 7 days  -ibuprofen 400 mg every 6 hours PRN pain     #Elevated blood sugar  Blood sugar 117 on admission. Hb A1c 5.3. Likely stress related.   -continue to monitor     #Anxiety  #Depression  -further management per psychiatry     Currently, medically stable and internal medicine will sign off. Please contact if future questions or concerns arise. Thank you for the opportunity to  "be a part of this patient's care.        DENIS Rivera CNP  Internal Medicine [facility] Hospitalist         [facility] Course:   Robert Steele is a 23 year old female with history of alcohol use, depression and anxiety who presented to ED seeking detox from alcohol as well as reporting abdominal pain. Medically cleared in ED including CT of abdomen and UA/Wet Prep completed. EtOH breath 0.00. UDS positive for cannabinoids.  Drinking over 1 pint of alcohol per day. No history of seizures or DTs. Admitted to  as voluntary patient. PTA medications continued including fluoxetine for mood. Reports mood has been \"Very anxious\" due to stressors, some depression, denies SI. IM consult placed for medical co-management. Admission labs ordered, labs in ED reviewed. Pt reported goals for hospitalization are complete detox and get help with referrals for CD treatment. She has a court hearing for an eviction on Wednesday and hoping to discharge home Tuesday.     The patient's symptoms of alcohol withdrawal improved. She did not require any PRN diazepam [facility] and was out of detox 5/30/23. She was agreeable to have an assessment completed for referrals to residential CD treatment and then requested to discharge home.     Today Robert Steele reports having no thoughts of harming self or others. In addition, she has notable risk factors for self-harm, including age, single status, anxiety, substance abuse and previous suicide attempts. However, risk is mitigated by patient has denied SI during admission, is future oriented, expressed commitment to family, ability to volunteer a safety plan and history of seeking help when needed. Therefore, based on all available evidence including the factors cited above, she does not appear to be at imminent risk for self-harm, does not meet criteria for a 72-hr hold, and therefore remains appropriate for ongoing outpatient level of care.     Robert Steele was discharged " "to home. At the time of discharge Robert Steele was determined to not be a danger to herself or others.          Discharge Medications:     Current Discharge Medication List      START taking these medications    Details   metroNIDAZOLE (FLAGYL) 500 MG tablet Take 1 tablet (500 mg) by mouth 2 times daily  Qty: 11 tablet, Refills: 0    Associated Diagnoses: Bacterial vaginosis         CONTINUE these medications which have CHANGED    Details   hydrOXYzine (ATARAX) 25 MG tablet Take 1 tablet (25 mg) by mouth every 6 hours as needed for anxiety  Qty: 120 tablet, Refills: 0    Associated Diagnoses: Anxiety         CONTINUE these medications which have NOT CHANGED    Details   albuterol (PROAIR HFA/PROVENTIL HFA/VENTOLIN HFA) 108 (90 Base) MCG/ACT inhaler Inhale 2 puffs into the lungs every 6 hours as needed for shortness of breath, wheezing or cough      benzoyl peroxide (BENZOYL PEROXIDE WASH) 5 % external liquid Apply topically every morning Wash affected areas on groin/thighs/face      clindamycin (CLEOCIN T) 1 % external lotion Apply 1 g topically daily as needed (Apply to the face, armpits, groin, thighs)      FLUoxetine (PROZAC) 40 MG capsule Take 40 mg by mouth daily      fluticasone (FLONASE) 50 MCG/ACT nasal spray Spray 1 spray into both nostrils daily      glycopyrrolate (ROBINUL) 1 MG tablet Take 1 mg by mouth 3 times daily      minocycline (MINOCIN) 100 MG capsule Take 100 mg by mouth 2 times daily      tretinoin (RETIN-A) 0.1 % external cream Apply topically At Bedtime                  Psychiatric Examination:   Appearance:  awake, alert and adequately groomed  Attitude:  cooperative  Eye Contact:  good  Mood:  \"better, still a little anxious\"  Affect:  appropriate and in normal range and mood congruent  Speech:  clear, coherent and normal prosody  Psychomotor Behavior:  no evidence of tardive dyskinesia, dystonia, or tics  Thought Process:  logical, linear and goal oriented  Associations:  no loose " associations  Thought Content:  no evidence of suicidal ideation or homicidal ideation and no evidence of psychotic thought  Insight:  fair  Judgment:  intact  Oriented to:  time, person, and place  Attention Span and Concentration:  intact  Recent and Remote Memory:  intact  Language:English, fluent  Fund of Knowledge: appropriate  Muscle Strength and Tone: normal  Gait and Station: Normal         Discharge Plan:     Disposition: Return Home     Recommendation:  Please call [facility] to schedule and intake for residential treatment. Please follow any and all recommendations as needed and requested.      [address] [place] [address] [phone]     [facility]   [address] [place] [address]  Phone: [phone]    Attestation:  Patient has been seen and evaluated by me, Natalie Davis DO on day of discharge. 32 minutes were spent in coordination of discharge planning.

## 2023-05-30 NOTE — PLAN OF CARE
Behavioral Team Discussion: (5/30/2023)    Continued Stay Criteria/Rationale: Patient admitted for Alcohol  Use Disorder.  Plan: The following services will be provided to the patient; psychiatric assessment, medication management, therapeutic milieu, individual and group support, and skills groups.   Participants: 3A Provider: Dr. Natalie Davis MD; 3A RN: Sarwat Cook RN; 3A CM's: Wilson BRIDG[facility] [place].  Summary/Recommendation: Providers will assess today for treatment recommendations, discharge planning, and aftercare plans. Wilson BRIDGES [facility] CM will meet with pt for discharge planning.   Medical/Physical: Pt. Denies any problems at this time and will keep nursing staff informed of any complications   Precautions:   Behavioral Orders   Procedures     Code 1 - Restrict to Unit     Routine Programming     As clinically indicated     Status 15     Every 15 minutes.     Withdrawal precautions     Rationale for change in precautions or plan: N/A  Progress: No Change.    ASAM Dimension Scale Ratings:  Dimension 1: 3 Client tolerates and johnny with withdrawal discomfort poorly. Client has severe intoxication, such that the client endangers self or others, or intoxication has not abated with less intensive levels of services. Client displays severe signs and symptoms; or risk of severe, but manageable withdrawal; or withdrawal worsening despite detox at less intensive level.  Dimension 2: 1 Client tolerates and johnny with physical discomfort and is able to get the services that the client needs.  Dimension 3: 2 Client has difficulty with impulse control and lacks coping skills. Client has thoughts of suicide or harm to others without means; however, the thoughts may interfere with participation in some treatment activities. Client has difficulty functioning in significant life areas. Client has moderate symptoms of emotional, behavioral, or cognitive problems. Client is able to participate in most treatment  activities.  Dimension 4: 3 Client displays inconsistent compliance, minimal awareness of either the client's addiction or mental disorder, and is minimally cooperative.  Dimension 5: 4 No awareness of the negative impact of mental health problems or substance abuse. No coping skills to arrest mental health or addiction illnesses, or prevent relapse.  Dimension 6: 4 Client has (A) Chronically antagonistic significant other, living environment, family, peer group or long-term criminal justice involvement that is harmful to recovery or treatment progress, or (B) Client has an actively antagonistic significant other, family, work, or living environment with immediate threat to the client's safety and well-being.

## 2023-05-30 NOTE — DISCHARGE INSTRUCTIONS
Behavioral Discharge Planning and Instructions  THANK YOU FOR CHOOSING THE [facility]  3A  [phone]    Summary: You were admitted to [facility] on 05/29/2023 for detoxification from Alcohol Use Disorder and Cannabis Use Disorder.  A medical exam was performed that included lab work. You have met with a  and opted to attending residential treatment at either [facility] or [facility].  Please take care and make your recovery a priority, Nehmafor!    Recommendation:  Please call [facility] and [facility] to schedule and intake for residential treatment. Please follow any and all recommendations as needed and requested.     [address] [place] [address]  [phone]    [facility]   [address] [place] [address]  Phone: [phone]    Main Diagnosis: Per Dr. Natalie Davis MD  303.90 (F10.20) Alcohol Use Disorder Severe  304.30 (F12.20) Cannabis Use Disorder Severe    Major Treatments, Procedures and Findings:  You have withdrawn from alcohol and did not require medication.  You have met with a  to develop a treatment plan for discharge.  You have had labs drawn and those results have been reviewed with you. Please take a copy of your lab work with you to your next primary care physician appointment.  Major Treatments, Procedures and Findings:  You were detoxed from alcohol with the Modified Selective Severity Protocol using Valium. You have met with a  to develop a treatment plan for discharge.  You have had labs drawn and a copy of those labs will be sent home with you.  Please bring your lab results with to your follow up doctor appointment.    Symptoms to Report:  If you experience more anxiety, confusion, sleeplessness, deep sadness or thoughts of suicide, notify your treatment team or notify your primary care physician. IF ANY OF THE SYMPTOMS YOU ARE EXPERIENCING ARE A MEDICAL EMERGENCY CALL 911 IMMEDIATELY.     If  you or someone you know is struggling or in crisis, help is available.  Call or text "[employer] or chat  at [employer]Arynga    Lifestyle Adjustment: Adjust your lifestyle to get enough sleep, relaxation, exercise and  good nutrition. Continue to develop healthy coping skills to decrease stress and promote a sober living environment. Do not use alcohol, illegal drugs or addictive medications other than what is currently prescribed. AA, NA, and  Sponsor are excellent resources for support.     Primary Provider:    Disposition: Return Home     Health Action Plan:  1.Create a daily schedule  2. Eat Healthy  3. Plan Enjoyable Sober Activities  4. Use Problem Solving Skills and Deal with Issues as they Arise.   5. Be Physically Active  6. Take your medications as prescribed  7. Get enough restful sleep  8. Practice Relaxation  9. Spend time with Supportive People  10. No use of alcohol, illegal drugs or addictive medications other than what is currently prescribed.   11.AA, NA Sponsor are excellent resources for support  12. Explore how  you can utilize spirituality in your recovery    Facts about COVID19 at www.cdc.gov/COVID19 and www.[place].gov/covid19    Keeping hands clean is one of the most important steps we can take to avoid getting sick and spreading germs to others.  Please wash your hands frequently and lather with soap for at least 20 seconds!    Follow-up Appointment:   Appointment Date/Time: June 7th 1:30 PM    Psychiatrist/Primary Care Giver:    [facility] Location: [address] [place] [address]     Phone number: [phone]     Resources:     Resources for on line recovery meetings:  AA meetings can be found online; search for them at: http://aa-intergroup.org/directory.php  AA meetings via [place] for [place] area can be found online at: https://[place].org/find-a-meeting/holiday-closings/  NA meetings via [place] for [place] area can be found online at:  "https://sites.Better Weekdays.com/view/[place]regionofnarcoticsanonymous/home?authuser=2    Www.[employer]  has online resources for meeting and recovery care including Podcast \"Let's Talk:Addiction & Recovery Podcasts    Www.[place]recovery.org     DISCHARGE RESOURCES:  -SMART Recovery - self management for addiction recovery:  www.smartrecovery.org    -Pathways ~ A Health Crisis Resource & Support Center: 260.897.5748.  -[facility] [phone]   -[facility] Behavioral Intake 617-570-4252 or 429-748-3725.  -Suicide Awareness Voices of Education (SAVE) (www.save.org): 833-819-BSUY (2040)  -National Suicide Prevention Line (www.mentalhealthmn.org): 431-199-BUGP (6874)  -National New Era on Mental Illness (www.[place].christine.org): 512.776.1820 or 662-500-0881.  -Wuya5qwjb: text the word LIFE to 71305 for immediate support and crisis intervention  -Mental Health Consumer/Survivor Network of [place] (www.mhcsn.net): 301.972.3576 or 752-209-5852  -Mental Health Association of [place] (www.mentalhealth.org): 383.587.7237 or 901-632-6336     -Substance Abuse and Mental Health Services (www.samhsa.gov)  -Harm Reduction Coalition (www. Harmreduction.org)  -www.prescribetoprevent.org or http://prescribetoprevent.org/video  -Poison control 2-183-708-7676   **[place] Opioid Prevention Coalition: www.opioidcoalition.org    Sober Support Group Information:  AA/NA & Sponsor/Support  -Alcoholics Anonymous (www.alcoholics-anonymous.org): for local information 24 hours/day  -AA Intergroup service office in [place] (http://www.aastpaul.org/) 928.107.5151  -AA Intergroup service office in [facility]: 176.999.5830. (http://www.a[place].org/)  -Narcotics Anonymous (www.nam[place].org) (062) 345-5205   **Sober Fun Activities: www.soberKarma Gamingactivities.[employer].Hubkick/[facility]//[place]    [place] Recovery Connection (MRC)  [facility] connects people seeking recovery to resources that help foster and sustain long-term recovery.  Whether " you are seeking resources for treatment, transportation, housing, job training, education, health care or other pathways to recovery, [facility] is a great place to start.    Phone: (959) [phone].  www.[employer] (Great listing of all types of recovery and non-recovery related resources)      General Medication Instructions:   See your medication sheet(s) for instructions.   Take all medicines as directed.  Make no changes unless your doctor suggests them.   Go to all your doctor visits.  Be sure to have all your required lab tests. This way, your medicines can be refilled on time.  Do not use any drugs not prescribed by your provider.  AA/NA and Sponsors are excellent resources for support  Avoid alcohol.    Any follow up concerns:  Nursing questions call the Unit -[facility] [phone]  Medical Record call [phone]  Outpatient Behavioral Intake call [phone]  LP+ Wait List/Bed Availability call [phone]    The entire treatment team has appreciated the opportunity to work with you Robert.  We wish you the best in the future and with your lifelong recovery goals. Please bring this discharge folder with you to all follow up appointments.  It contains your lab results, diagnosis, medication list and discharge recommendations.    THANK YOU FOR CHOOSING THE [facility]

## 2023-05-30 NOTE — PROGRESS NOTES
Triage & Transition Services, [address]     Robert Steele  May 30, 2023    PLAN: Patient to discharge home and participate in Outpatient treatment.     Ward is currently admitted to [address]. Please see H&P for complete assessment information and therapist Progress Notes for additional clinical details.      worked with DOYLE Davis regarding patient's care today.     Met with patient in-person and provided housing and eviction advocacy and informational resources for [facility] [phone], the [facility] Self-Help Desk 546-812-1429, and informed the patient about the zulily  Network which could assist with informing the patient and providing advocacy if needed. DOYLE Davis, met with the patient and completed their Comprehensive JAXON Assessment and discussed post-discharge plans including sending referrals to [facility] and [facility].     Clinical care team and client have agreed on an aftercare plan that includes the following level of care at discharge: Discharge to home and participate in Outpatient treatment. Patient to follow-up on referrals for intake scheduling.     Natalie Reid  Triage & Transition Services - Mental Health and Addiction Service Line  [address] - Adult Inpatient Addiction Psychiatry Unit

## 2023-05-30 NOTE — PROGRESS NOTES
You were detoxed from alcohol with the Modified Selective Severity Protocol.  You have met with a  to develop a treatment plan for discharge.  You have had labs drawn and a copy of those labs will be sent home with you. Your alcohol withdrawal is complete and you are being discharged today.  Please bring your lab results with to your follow up doctor appointment.  Make your recovery a priority!

## 2023-05-30 NOTE — PLAN OF CARE
"Goal Outcome Evaluation:    Plan of Care Reviewed With: patient      Pt is out of detox. She reports anxiety and received PRN hydroxyzine. She has good oral intake. She brightens on approach with staff and peers. She received education on distress tolerance. She made an appointment with her PCP. She said if she experiences distress after court tomorrow she plans to go to her godmother and interact with her dogs. She also received the phone number for [place] warm line.     /82   Pulse 67   Temp 97.7  F (36.5  C) (Oral)   Resp 16   Ht 1.753 m (5' 9\")   Wt 102.7 kg (226 lb 6.6 oz)   LMP 02/01/2023 (Approximate)   SpO2 99%   BMI 33.44 kg/m                 "

## 2023-05-30 NOTE — PLAN OF CARE
Problem: Substance Withdrawal  Goal: Substance Withdrawal  Description: Signs and symptoms of listed problems will be absent or manageable.  Outcome: Progressing     Pt monitored for alcohol withdrawal, MSSA of 1, 1. Pt did not receive valium this shift. Pt has not received any medication for withdrawal symptoms since arriving at the ED.    Pt showered and has gone between isolative and social behaviors this shift. Pt did not eat dinner after discovering a hair on her tray. Pt refused offers to order new tray and did not want any snacks at that time.

## 2023-05-30 NOTE — PLAN OF CARE
Problem: Substance Withdrawal  Goal: Substance Withdrawal  Description: Signs and symptoms of listed problems will be absent or manageable.  Outcome: Progressing     Problem: Sleep Disturbance  Goal: Adequate Sleep/Rest  Outcome: Progressing   Goal Outcome Evaluation:    The Pt slept for 5 hours. Her MSSA score was 2. The patient has not required medications for alcohol withdrawal since her admission in the ED and on the unit. Therefore alcohol withdrawal monitoring will end per MSSA protocol; Pt is out of detox.

## 2023-05-30 NOTE — PROGRESS NOTES
"  Unit 3A    UNIVERSAL ADULT DIAGNOSTIC ASSESSMENT - Substance Use Disorder    Provider Name and Credentials: Christine Matthews MS, [facility]    PATIENT'S NAME: Robert Steele  PREFERRED NAME: Wrad  PRONOUNS: she/her/hers     MRN: [id number]  : 2000   Last 4 SSN: [id number]  ACCT. NUMBER:  [id number]  DATE OF SERVICE: 2023   START TIME: 12:00pm  END TIME: 1:00pm  PREFERRED PHONE: [phone]   EMAIL: [email or web address]   May we leave a program related message: Yes  SERVICE MODALITY:  In-person      Identifying Information:  Patient is a 23 year old,  female who was referred for an assessment by self. The pronoun use throughout this assessment reflects the patient's chosen pronoun. Patient attended the session alone.     Chief Complaint:   The reason for seeking services at this time is: \"detox from alcohol\"  The problem(s) began at the age of [age] years old. Patient has not attempted to resolve these concerns in the past.  Patient does not appear to be in severe withdrawal, an imminent safety risk to self or others, or requiring immediate medical attention and may proceed with the assessment interview.    Social/Family History:  Patient reported she grew up in [place]. Patient was raised by mother and god mother. Patient reported that her childhood was okay.  Patient describes current relationships with family of origin as strained.      The patient describes her cultural background as .  Cultural influences and impact on patient's life structure, values, norms, and healthcare: Patient denies.  Contextual influences on patient's health include: Contextual Factors: Individual Factors MH/CD issues .  Patient identified her preferred language to be English. Patient reported she does not need the assistance of an  or other support involved in therapy.     Patient reported had no significant delays in developmental tasks.  Patient's highest education level was high " school graduate and some college. Patient identified the following learning problems: attention.  Patient reports she is able to understand written materials.    Patient's current relationship status is single for 3 years.   Patient identified her sexual orientation as straight.  Patient reported having zero child(estuardo).     Patient's current living/housing situation involves staying in own home/apartment.  Patient lives with mother and she reports that housing is not stable. Patient identified god-mother as part of her support system.  Patient identified the quality of these relationships as stable and meaningful.      Patient reports she is not involved in community of [employer] activities. Patients reports spirituality impacts her recovery in the following ways:  Patient denies.     Patient reports engaging in the following recreational/leisure activities: swimming, reading, writing. Patient reports engaging in the following recreation/leisure activities while using: isolation, lazy. Patient reports the following people are supportive of recovery: god-mother.  Patient is currently employed full time and reports they are not able to function appropriately at work..  Patient reports her income is obtained through employment.  Patient does identify finances as a current stressor. Cultural and socioeconomic factors do not affect the patient's access to services.    Patient denies substance related arrests or legal issues.  Patient denies being on probation / parole / under the jurisdiction of the court.    Patient's Strengths and Limitations:  Patient identified the following strengths or resources that will help her succeed in treatment: wiling to change. Things that may interfere with the patient's success in treatment include: housing instability and triggers, and access to substances.     Assessments:  The following assessments were completed by patient for this visit:  PHQ9:       6/10/2021    10:45 AM 6/14/2021     11:33 AM 7/23/2021     8:12 AM 5/30/2023    12:24 PM   PHQ-9 SCORE   PHQ-9 Total Score MyChart  15 (Moderately severe depression) 9 (Mild depression)    PHQ-9 Total Score 18 15 9 24     GAD7:       6/10/2021    10:45 AM 6/14/2021    11:35 AM 7/23/2021     8:12 AM 5/30/2023    12:24 PM   PENNY-7 SCORE   Total Score  14 (moderate anxiety) 10 (moderate anxiety)    Total Score 12 14 10 21     PROMIS 10-Global Health (all questions and answers displayed):       5/30/2023    12:00 PM   PROMIS 10   In general, would you say your health is: 2   In general, would you say your quality of life is: 2   In general, how would you rate your physical health? 1   In general, how would you rate your mental health, including your mood and your ability to think? 1   In general, how would you rate your satisfaction with your social activities and relationships? 1   In general, please rate how well you carry out your usual social activities and roles. (This includes activities at home, at work and in your community, and responsibilities as a parent, child, spouse, employee, friend, etc.) 2   To what extent are you able to carry out your everyday physical activities such as walking, climbing stairs, carrying groceries, or moving a chair? 3   In the past 7 days, how often have you been bothered by emotional problems such as feeling anxious, depressed, or irritable? 5   In the past 7 days, how would you rate your fatigue on average? 5   In the past 7 days, how would you rate your pain on average, where 0 means no pain, and 10 means worst imaginable pain? 7   Global Mental Health Score 5   Global Physical Health Score 7   PROMIS TOTAL - SUBSCORES 12     GAIN-sliding scale:      5/30/2023    12:24 PM   When was the last time that you had significant problems...   with feeling very trapped, lonely, sad, blue, depressed or hopeless about the future? Past month   with sleep trouble, such as bad dreams, sleeping restlessly, or falling asleep during  the day? Past Month   with feeling very anxious, nervous, tense, scared, panicked or like something bad was going to happen? Past month   with becoming very distressed & upset when something reminded you of the past? Past month   with thinking about ending your life or committing suicide? 2 to 12 months ago          5/30/2023    12:24 PM   When was the last time that you did the following things 2 or more times?   Lied or conned to get things you wanted or to avoid having to do something? Past month   Had a hard time paying attention at school, work or home? Past month   Had a hard time listening to instructions at school, work or home? Past month   Were a bully or threatened other people? 1+ years ago   Started physical fights with other people? 1+ years ago       Personal and Family Medical History:   Patient did not report a family history of mental health concerns.  Patient reports the following family history: Patient denies.  No family history on file.     Patient reported the following previous mental health diagnoses: depression/anxiety.  Patient reports her primary mental health symptoms include: sad, depressed, loss of interest, angry, no energy and these do impact her ability to function.   Patient has received mental health services in the past: therapy, outpatient and DBT.  Psychiatric Hospitalizations: [facility] 2021.  Patient denies a history of civil commitment.  Current mental health services/providers include:  Patient denies.    Patient has had a physical exam to rule out medical causes for current symptoms.  Date of last physical exam was within the past year. Symptoms have developed since last physical exam and client was encouraged to follow up with PCP.  . The patient does not have a Primary Care Provider and was encouraged to establish care with a PCP.. Patient reports no current medical concerns.  Patient denies any issues with pain.  Patient denies pregnancy. There  are significant appetite / nutritional concerns / weight changes. Patient does not report a history of an eating disorder. Patient does not report a history of head injury / trauma / cognitive impairment.      Patient reports current meds as:   Outpatient Medications Marked as Taking for the 5/29/23 encounter (Hospital Encounter)   Medication Sig     albuterol (PROAIR HFA/PROVENTIL HFA/VENTOLIN HFA) 108 (90 Base) MCG/ACT inhaler Inhale 2 puffs into the lungs every 6 hours as needed for shortness of breath, wheezing or cough     benzoyl peroxide (BENZOYL PEROXIDE WASH) 5 % external liquid Apply topically every morning Wash affected areas on groin/thighs/face     clindamycin (CLEOCIN T) 1 % external lotion Apply 1 g topically daily as needed (Apply to the face, armpits, groin, thighs)     FLUoxetine (PROZAC) 40 MG capsule Take 40 mg by mouth daily     fluticasone (FLONASE) 50 MCG/ACT nasal spray Spray 1 spray into both nostrils daily     glycopyrrolate (ROBINUL) 1 MG tablet Take 1 mg by mouth 3 times daily     hydrOXYzine (ATARAX) 25 MG tablet Take 1 tablet (25 mg) by mouth every 6 hours as needed for anxiety     metroNIDAZOLE (FLAGYL) 500 MG tablet Take 1 tablet (500 mg) by mouth 2 times daily     minocycline (MINOCIN) 100 MG capsule Take 100 mg by mouth 2 times daily     tretinoin (RETIN-A) 0.1 % external cream Apply topically At Bedtime       Medication Adherence:  Patient reports taking prescribed medications as prescribed.    Patient Allergies:  No Known Allergies    Medical History:    Past Medical History:   Diagnosis Date     Anxiety      Depression        Substance Use:  Patient reported no family history of chemical health issues. Patient has not received substance use disorder and/or gambling treatment in the past. Patient has not been to detox. Patient is not currently receiving any chemical dependency treatment. Patient reports no history of support group attendance.        Substance Age of first use  Pattern and duration of use (include amounts and frequency) Date of last use     Withdrawal potential Route of administration   Has used Alcohol 14 Daily 1 pint or more 05/29/23 No oral   Has used Marijuana   11 Daily 3.5 every two days 5/29/23 No smoked     Has not used Amphetamines          Has not used Cocaine/ crack           Has used Hallucinogens          Has not used Inhalants        Has not used Heroin        Has not used Other Opiates        Has not used Benzodiazepine          Has not used Barbiturates        Has not used Over the counter meds.        Has not used Caffeine        Ha used Nicotine  13/14 Half a pack a day  5/29/23 No smoked   Has not used other substances not listed above:  Identify:              Patient reported the following problems as a result of their substance use: family problems, financial problems, legal issues, occupational / vocational problems and relationship problems.  Patient is concerned about substance use.     Patient reports experiencing the following withdrawal symptoms within the past 12 months: sweating, shaky/jittery/tremors, unable to sleep, agitation, headache, fatigue, sad/depressed feeling, muscle aches, vivid/unpleasant dreams, irritability, sensitivity to noise, high blood pressure, nausea/vomiting, dizziness, diarrhea, diminished appetite, unable to eat, confused/disrupted speech and anxiety/worry and the following within the past 30 days: sweating, shaky/jittery/tremors, agitation, fatigue, sad/depressed feeling, vivid/unpleasant dreams, irritability, sensitivity to noise, high blood pressure, nausea/vomiting, dizziness and anxiety/worry.   Patients reports urges to use Alcohol and Cannabis/ Hashish.  Patient reports she has used more Alcohol and Cannabis/ Hashish than intended and over a longer period of time than intended. Patient reports she has not had unsuccessful attempts to cut down or control use of Alcohol and Cannabis/ Hashish.  Patient reports  longest period of abstinence was 2-3 days and return to use was due to stress, speaking with mother is triggering. Patient reports she has needed to use more Alcohol and Cannabis/ Hashish to achieve the same effect.  Patient does  report diminished effect with use of same amount of Alcohol and Cannabis/ Hashish.     Patient does  report a great deal of time is spent in activities necessary to obtain, use, or recover from Alcohol and Cannabis/ Hashish effects.  Patient does  report important social, occupational, or recreational activities are given up or reduced because of Alcohol and Cannabis/ Hashish use.  Alcohol and Cannabis/ Hashish use is continued despite knowledge of having a persistent or recurrent physical or psychological problem that is likely to have caused or exacerbated by use.  Patient reports the following problem behaviors while under the influence of substances patient denies.     Patient reports her recovery goals are to not need substances to complete simple task everyday.     Patient reports substance use has not impacted her ability to function in a school setting. Patient reports substance use has impacted her ability to function in a work setting.  Patients demographics and history impact her recovery in the following ways:  Patient denies.     Patient does not have a history of gambling concerns and/or treatment. Patient does  have other addictive behaviors she is concerned about eating, shopping, and sleeping.         Significant Losses / Trauma / Abuse / Neglect Issues:   Patient did not serve in the .  There are indications or report of significant loss, trauma, abuse or neglect issues related to: are indications or report of significant loss, trauma, abuse or neglect issues related to death.  Concerns for possible neglect are not present.     Safety Assessment:   Current Safety Concerns:  [place] Suicide Severity Rating Scale (Short Version)      7/7/2021     9:00 AM 7/14/2021      9:00 AM 7/23/2021     8:00 AM 1/14/2022     5:32 PM 9/28/2022     8:42 PM 5/29/2023    12:09 AM 5/29/2023    12:01 PM   [place] Suicide Severity Rating (Short Version)   Over the past 2 weeks have you felt down, depressed, or hopeless? yes yes yes yes no yes    Over the past 2 weeks have you had thoughts of killing yourself? yes yes yes no no no    Have you ever attempted to kill yourself?  no no yes no yes    When did this last happen?    more than 6 months ago  more than 6 months ago    Q1 Wished to be Dead (Past Month)       no   Q2 Suicidal Thoughts (Past Month)       no   Q3 Suicidal Thought Method       no   Q4 Suicidal Intent without Specific Plan       no   Q5 Suicide Intent with Specific Plan       no   Q6 Suicide Behavior (Lifetime)       no   Level of Risk per Screen       low risk     Patient denies current homicidal ideation and behaviors.  Patient denies current self-injurious ideation and behaviors.    Patient denied risk behaviors associated with substance use.  Patient denies any high risk behaviors associated with mental health symptoms.  Patient reports the following current concerns for their personal safety: None.  Patient reports there are not firearms in the house. There are no firearms in the home..     History of Safety Concerns:  Patient denied a history of homicidal ideation.     Patient denied a history of personal safety concerns.    Patient denied a history of assaultive behaviors.    Patient denied a history of sexual assault behaviors.     Patient denied a history of risk behaviors associated with substance use.  Patient denies any history of high risk behaviors associated with mental health symptoms.  Patient reports the following protective factors: help seeking behaviors when distressed and not use substances, committment to well-being and sense of personal control or determination    Risk Plan:  See Recommendations for Safety and Risk Management Plan    Review of Symptoms per  patient report:  Substance Use:  passing out, daily use, substance use at work, work absence due to substance use, family relationship problems due to substance use and cravings/urges to use     Collateral Contact Summary:   Collateral contacts contributing to this assessment:  None    If court related records were reviewed, summarize here: None    Information from collateral contacts supported/largely agreed with information from the client and associated risk ratings.    Information in this assessment was obtained from the medical record and provided by patient who is a good historian.    Patient will have open access to their mental health medical record.    Diagnostic Criteria: 1.) Alcohol/drug is often taken in larger amounts or over a longer period than was intended.  Met for Alcohol and Cannabis.  3.) A great deal of time is spent in activities necessary to obtain alcohol, use alcohol, or recover from its effects.  Met for Alcohol and Cannabis.  4.) Craving, or a strong desire or urge to use alcohol/drug.  Met for Alcohol and Cannabis.  5.) Recurrent alcohol/drug use resulting in a failure to fulfill major role obligations at work, school or home.  Met for Alcohol and Cannabis.  6.) Continued alcohol use despite having persistent or recurrent social or interpersonal problems caused or exacerbated by the effects of alcohol/drug.  Met for Alcohol and Cannabis.  7.) Important social, occupational, or recreational activities are given up or reduced because of alcohol/drug use.  Met for Alcohol and Cannabis.  8.) Recurrent alcohol/drug use in situations in which it is physically hazardous.  Met for Alcohol and Cannabis.  9.) Alcohol/drug use is continued despite knowledge of having a persistent or recurrent physical or psychological problem that is likely to have been caused or exacerbated by alcohol.  Met for Alcohol and Cannabis.  10.) Tolerance, as defined by either of the following: A need for markedly  increased amounts of alcohol/drug to achieve intoxication or desired effect..  Met for Alcohol and Cannabis.  11.) Withdrawal, as manifested by either of the following: The characteristic withdrawal syndrome for alcohol/drug (refer to Criteria A and B of the criteria set for alcohol/drug withdrawal).. Met for Alcohol and Cannabis.     As evidenced by self report and criteria, client meets the following DSM5 Diagnoses:   (Sustained by DSM5 Criteria Listed Above)  Alcohol Use Disorder   303.90 (F10.20) Severe In a controlled environment  304.30 (F12.20) Cannabis Use Disorder Severe  In a controlled environment.    Recommendations:     1. Plan for Safety and Risk Management:  Recommended that patient call 911 or go to the local ED should there be a change in any of these risk factors..      Report to child / adult protection services was NA.     2. [facility] Referrals:   Recommendations: Patient should seek residential treatment at this time.  Patient reports they are willing to follow these recommendations.     Patient would like the following family or other support people involved in their treatment: Patient denies. Patient does not have a history of opiate use.    3. Mental Health Referrals:  Patient should seek professional mental health services such as a therapist and or psychiatrist to learn how her substance use overall effects her mental health.      4. Patient identified no cultural concerns that need to be addressed in treatment.    5. Recommendations for treatment focus:   Alcohol / Substance Use - tolerance, withdrawal, progressive use, loss of control, cravings. Patient may lack insight into triggers/cues. Patient may lack coping skills at this time. Patient may be at high risk for relapse at this time..     Clinical Substantiation:  Summary: Discussed with pt their desired outcome; reviewed living situation and community supports; reviewed type of use and risk factors for continued use. Risk  ratings/justification below:   Dimension 1 -  Acute Intoxication/Withdrawal: 0 - No Problem Patient is medically stable and cleared for discharge.   Dimension 2 - Biomedical: 2 - Moderate Problem Patient does not have a primary care provider at this time. Patient has not had a physical in the last year. Patient stated that she knows where she wants to go for a primary and will set this up. Patient was encouraged to seek a primary care provider and address any medical needs.   Dimension 3 - Emotional/Behavioral/Cognitive Conditions: 2 - Moderate Problem Patient should seek professional mental health services such as a therapist and or psychiatrist to learn how her substance use overall effects her mental health.    Dimension 4 - Readiness to Change:  1 - Minor Problem Patient endorses external and internal motivators for change at this time. Patient may lack changes needed to sustain sobriety at this time. Patient appeared to be in the preparation stage of change at this time.   Dimension 5 - Relapse/Continued Use/ Continued Problem Potential: 4 - Extreme Problem tolerance, withdrawal, progressive use, loss of control, cravings. Patient may lack insight into triggers/cues. Patient may lack coping skills at this time. Patient may be at high risk for relapse at this time..   Dimension 6 - Recovery Environment:  3 - Severe Problem Patient reports she lives at home with her mother and reports the home is not stable. Patient is employed and reports she is not able to function at work. Patient may lack structure, engagement and routine into positive activities. Patient may lack social sober support at this time.     Placement/Program/Barriers Identified: None    Referral: Residential Treatment       DAANES Assessment ID: [id number]     Provider Name/ Credentials:Christine Matthews MS, [facility]

## 2023-08-12 ENCOUNTER — HEALTH MAINTENANCE LETTER (OUTPATIENT)
Age: 23
End: 2023-08-12

## 2024-09-10 ENCOUNTER — HOSPITAL ENCOUNTER (EMERGENCY)
Facility: CLINIC | Age: 24
Discharge: PSYCHIATRIC OR SUBSTANCE USE FACILITY | End: 2024-09-11
Attending: EMERGENCY MEDICINE | Admitting: EMERGENCY MEDICINE
Payer: COMMERCIAL

## 2024-09-10 DIAGNOSIS — R45.851 SUICIDAL IDEATION: ICD-10-CM

## 2024-09-10 LAB
ALBUMIN UR-MCNC: 30 MG/DL
AMPHETAMINES UR QL SCN: ABNORMAL
ANION GAP SERPL CALCULATED.3IONS-SCNC: 11 MMOL/L (ref 7–15)
APPEARANCE UR: CLEAR
BACTERIA #/AREA URNS HPF: ABNORMAL /HPF
BARBITURATES UR QL SCN: ABNORMAL
BASOPHILS # BLD AUTO: 0.1 10E3/UL (ref 0–0.2)
BASOPHILS NFR BLD AUTO: 1 %
BENZODIAZ UR QL SCN: ABNORMAL
BILIRUB UR QL STRIP: NEGATIVE
BUN SERPL-MCNC: 9.6 MG/DL (ref 6–20)
BZE UR QL SCN: ABNORMAL
CALCIUM SERPL-MCNC: 9.2 MG/DL (ref 8.8–10.4)
CANNABINOIDS UR QL SCN: ABNORMAL
CHLORIDE SERPL-SCNC: 108 MMOL/L (ref 98–107)
COLOR UR AUTO: YELLOW
CREAT SERPL-MCNC: 0.86 MG/DL (ref 0.51–0.95)
EGFRCR SERPLBLD CKD-EPI 2021: >90 ML/MIN/1.73M2
EOSINOPHIL # BLD AUTO: 0.2 10E3/UL (ref 0–0.7)
EOSINOPHIL NFR BLD AUTO: 2 %
ERYTHROCYTE [DISTWIDTH] IN BLOOD BY AUTOMATED COUNT: 14.1 % (ref 10–15)
FENTANYL UR QL: ABNORMAL
GLUCOSE SERPL-MCNC: 117 MG/DL (ref 70–99)
GLUCOSE UR STRIP-MCNC: NEGATIVE MG/DL
HCG UR QL: NEGATIVE
HCO3 SERPL-SCNC: 24 MMOL/L (ref 22–29)
HCT VFR BLD AUTO: 41.7 % (ref 35–47)
HGB BLD-MCNC: 13.9 G/DL (ref 11.7–15.7)
HGB UR QL STRIP: NEGATIVE
IMM GRANULOCYTES # BLD: 0.1 10E3/UL
IMM GRANULOCYTES NFR BLD: 1 %
KETONES UR STRIP-MCNC: ABNORMAL MG/DL
LEUKOCYTE ESTERASE UR QL STRIP: NEGATIVE
LYMPHOCYTES # BLD AUTO: 3 10E3/UL (ref 0.8–5.3)
LYMPHOCYTES NFR BLD AUTO: 33 %
MAGNESIUM SERPL-MCNC: 2 MG/DL (ref 1.7–2.3)
MCH RBC QN AUTO: 28.1 PG (ref 26.5–33)
MCHC RBC AUTO-ENTMCNC: 33.3 G/DL (ref 31.5–36.5)
MCV RBC AUTO: 84 FL (ref 78–100)
MONOCYTES # BLD AUTO: 0.6 10E3/UL (ref 0–1.3)
MONOCYTES NFR BLD AUTO: 7 %
MUCOUS THREADS #/AREA URNS LPF: PRESENT /LPF
NEUTROPHILS # BLD AUTO: 5.1 10E3/UL (ref 1.6–8.3)
NEUTROPHILS NFR BLD AUTO: 56 %
NITRATE UR QL: NEGATIVE
NRBC # BLD AUTO: 0 10E3/UL
NRBC BLD AUTO-RTO: 0 /100
OPIATES UR QL SCN: ABNORMAL
PCP QUAL URINE (ROCHE): ABNORMAL
PH UR STRIP: 5.5 [PH] (ref 5–7)
PLATELET # BLD AUTO: 362 10E3/UL (ref 150–450)
POTASSIUM SERPL-SCNC: 3.5 MMOL/L (ref 3.4–5.3)
RBC # BLD AUTO: 4.94 10E6/UL (ref 3.8–5.2)
RBC URINE: 1 /HPF
SODIUM SERPL-SCNC: 143 MMOL/L (ref 135–145)
SP GR UR STRIP: >1.03 (ref 1–1.03)
SQUAMOUS EPITHELIAL: 8 /HPF
UROBILINOGEN UR STRIP-MCNC: <2 MG/DL
WBC # BLD AUTO: 9.1 10E3/UL (ref 4–11)
WBC URINE: 3 /HPF

## 2024-09-10 PROCEDURE — 85025 COMPLETE CBC W/AUTO DIFF WBC: CPT | Performed by: EMERGENCY MEDICINE

## 2024-09-10 PROCEDURE — 80048 BASIC METABOLIC PNL TOTAL CA: CPT | Performed by: EMERGENCY MEDICINE

## 2024-09-10 PROCEDURE — 99285 EMERGENCY DEPT VISIT HI MDM: CPT

## 2024-09-10 PROCEDURE — 36415 COLL VENOUS BLD VENIPUNCTURE: CPT | Performed by: EMERGENCY MEDICINE

## 2024-09-10 PROCEDURE — 81001 URINALYSIS AUTO W/SCOPE: CPT | Performed by: EMERGENCY MEDICINE

## 2024-09-10 PROCEDURE — 80307 DRUG TEST PRSMV CHEM ANLYZR: CPT | Performed by: EMERGENCY MEDICINE

## 2024-09-10 PROCEDURE — 83735 ASSAY OF MAGNESIUM: CPT | Performed by: EMERGENCY MEDICINE

## 2024-09-10 PROCEDURE — 81025 URINE PREGNANCY TEST: CPT | Performed by: EMERGENCY MEDICINE

## 2024-09-10 RX ORDER — HYDROXYZINE HYDROCHLORIDE 25 MG/1
25 TABLET, FILM COATED ORAL EVERY 6 HOURS PRN
Status: DISCONTINUED | OUTPATIENT
Start: 2024-09-10 | End: 2024-09-11 | Stop reason: HOSPADM

## 2024-09-10 RX ORDER — LURASIDONE HYDROCHLORIDE 20 MG/1
20 TABLET, FILM COATED ORAL DAILY
Status: DISCONTINUED | OUTPATIENT
Start: 2024-09-11 | End: 2024-09-11 | Stop reason: HOSPADM

## 2024-09-10 RX ORDER — HYDROXYZINE HYDROCHLORIDE 25 MG/1
50 TABLET, FILM COATED ORAL EVERY 6 HOURS PRN
Status: DISCONTINUED | OUTPATIENT
Start: 2024-09-10 | End: 2024-09-11 | Stop reason: HOSPADM

## 2024-09-10 ASSESSMENT — ACTIVITIES OF DAILY LIVING (ADL)
ADLS_ACUITY_SCORE: 37

## 2024-09-10 ASSESSMENT — COLUMBIA-SUICIDE SEVERITY RATING SCALE - C-SSRS
4. HAVE YOU HAD THESE THOUGHTS AND HAD SOME INTENTION OF ACTING ON THEM?: YES
3. HAVE YOU BEEN THINKING ABOUT HOW YOU MIGHT KILL YOURSELF?: NO
1. IN THE PAST MONTH, HAVE YOU WISHED YOU WERE DEAD OR WISHED YOU COULD GO TO SLEEP AND NOT WAKE UP?: YES
5. HAVE YOU STARTED TO WORK OUT OR WORKED OUT THE DETAILS OF HOW TO KILL YOURSELF? DO YOU INTEND TO CARRY OUT THIS PLAN?: NO
6. HAVE YOU EVER DONE ANYTHING, STARTED TO DO ANYTHING, OR PREPARED TO DO ANYTHING TO END YOUR LIFE?: YES
2. HAVE YOU ACTUALLY HAD ANY THOUGHTS OF KILLING YOURSELF IN THE PAST MONTH?: YES

## 2024-09-11 ENCOUNTER — TELEPHONE (OUTPATIENT)
Dept: BEHAVIORAL HEALTH | Facility: CLINIC | Age: 24
End: 2024-09-11
Payer: COMMERCIAL

## 2024-09-11 VITALS
HEART RATE: 75 BPM | BODY MASS INDEX: 30.06 KG/M2 | WEIGHT: 210 LBS | HEIGHT: 70 IN | SYSTOLIC BLOOD PRESSURE: 100 MMHG | TEMPERATURE: 98.6 F | OXYGEN SATURATION: 100 % | DIASTOLIC BLOOD PRESSURE: 55 MMHG | RESPIRATION RATE: 18 BRPM

## 2024-09-11 PROCEDURE — 250N000013 HC RX MED GY IP 250 OP 250 PS 637: Performed by: EMERGENCY MEDICINE

## 2024-09-11 RX ORDER — DEXTROAMPHETAMINE SACCHARATE, AMPHETAMINE ASPARTATE MONOHYDRATE, DEXTROAMPHETAMINE SULFATE AND AMPHETAMINE SULFATE 1.25; 1.25; 1.25; 1.25 MG/1; MG/1; MG/1; MG/1
25 CAPSULE, EXTENDED RELEASE ORAL DAILY
Status: DISCONTINUED | OUTPATIENT
Start: 2024-09-11 | End: 2024-09-11 | Stop reason: HOSPADM

## 2024-09-11 RX ORDER — IBUPROFEN 600 MG/1
600 TABLET, FILM COATED ORAL ONCE
Status: COMPLETED | OUTPATIENT
Start: 2024-09-11 | End: 2024-09-11

## 2024-09-11 RX ADMIN — DEXTROAMPHETAMINE SACCHARATE, AMPHETAMINE ASPARTATE MONOHYDRATE, DEXTROAMPHETAMINE SULFATE, AMPHETAMINE SULFATE 25 MG: 1.25; 1.25; 1.25; 1.25 CAPSULE, EXTENDED RELEASE ORAL at 08:52

## 2024-09-11 RX ADMIN — IBUPROFEN 600 MG: 600 TABLET ORAL at 05:15

## 2024-09-11 RX ADMIN — LURASIDONE HYDROCHLORIDE 20 MG: 20 TABLET, COATED ORAL at 08:28

## 2024-09-11 ASSESSMENT — ACTIVITIES OF DAILY LIVING (ADL)
ADLS_ACUITY_SCORE: 37

## 2024-09-11 NOTE — ED NOTES
Intake called saying patient has been accepted to [facility] which is approx 1 1/4hrs away. RN to RN report [phone]. Accepting MD Dr. Nielsen.   no

## 2024-09-11 NOTE — ED PROVIDER NOTES
[facility] EMERGENCY ROOM   ED Mental Health Observation - Initiation Note    Robert Steele was placed into observation at 9:01 PM on 9/10/2024 for Mental health crisis.   Patient is expected to be under observation status for a minimum of eight hours.    MD Julio Restrepo Cindy Lin, MD  09/10/24 [id number]

## 2024-09-11 NOTE — ED PROVIDER NOTES
"[facility] EMERGENCY ROOM   ED Mental Health Observation - Daily Note for 9/11/2024    Robert Steele is a 24 year old female currently boarding in the ED while awaiting placement for Mental health crisis.  Please see the initial H&P for this patient's presentation, workup, and disposition plan.     Hold Status:  Patient is Voluntary, but holdable    Plan:  In brief, the patient's presentation is notable for suicidal ideation.  Patient has no active plan but by report a year ago she was seen for similar situation and when discharged from the Emergency Department went home and overdosed on all of her prescription medications.  Was assessed by the DEC  and agrees patient does require inpatient psychiatric admission.  Patient is awaiting Mental health placement    Interim History:  There were no significant events since last note.    Physical Exam:  /55 (BP Location: Left arm)   Pulse 75   Temp 98.6  F (37  C) (Oral)   Resp 18   Ht 1.778 m (5' 10\")   Wt 95.3 kg (210 lb)   LMP 07/22/2024   SpO2 100%   BMI 30.13 kg/m    No respiratory distress, on room air   Well perfused  Behavior appropriate    Medications provided prior to my care:  Medications   ibuprofen (ADVIL/MOTRIN) tablet 600 mg (600 mg Oral $Given 9/11/24 0515)       Laboratory (reviewed and interpreted):  Labs Ordered and Resulted from Time of ED Arrival to Time of ED Departure   BASIC METABOLIC PANEL - Abnormal       Result Value    Sodium 143      Potassium 3.5      Chloride 108 (*)     Carbon Dioxide (CO2) 24      Anion Gap 11      Urea Nitrogen 9.6      Creatinine 0.86      GFR Estimate >90      Calcium 9.2      Glucose 117 (*)    ROUTINE UA WITH MICROSCOPIC REFLEX TO CULTURE - Abnormal    Color Urine Yellow      Appearance Urine Clear      Glucose Urine Negative      Bilirubin Urine Negative      Ketones Urine Trace (*)     Specific Gravity Urine >1.030      Blood Urine Negative      pH Urine 5.5   "    Protein Albumin Urine 30 (*)     Urobilinogen Urine <2.0      Nitrite Urine Negative      Leukocyte Esterase Urine Negative      Bacteria Urine Few (*)     Mucus Urine Present (*)     RBC Urine 1      WBC Urine 3      Squamous Epithelials Urine 8 (*)    URINE DRUG SCREEN PANEL - Abnormal    Amphetamines Urine Screen Positive (*)     Barbituates Urine Screen Negative      Benzodiazepine Urine Screen Negative      Cannabinoids Urine Screen Positive (*)     Cocaine Urine Screen Negative      Fentanyl Qual Urine Screen Negative      Opiates Urine Screen Negative      PCP Urine Screen Negative     MAGNESIUM - Normal    Magnesium 2.0     HCG QUALITATIVE URINE - Normal    hCG Urine Qualitative Negative     CBC WITH PLATELETS AND DIFFERENTIAL    WBC Count 9.1      RBC Count 4.94      Hemoglobin 13.9      Hematocrit 41.7      MCV 84      MCH 28.1      MCHC 33.3      RDW 14.1      Platelet Count 362      % Neutrophils 56      % Lymphocytes 33      % Monocytes 7      % Eosinophils 2      % Basophils 1      % Immature Granulocytes 1      NRBCs per 100 WBC 0      Absolute Neutrophils 5.1      Absolute Lymphocytes 3.0      Absolute Monocytes 0.6      Absolute Eosinophils 0.2      Absolute Basophils 0.1      Absolute Immature Granulocytes 0.1      Absolute NRBCs 0.0         ED Course:  7:15 AM I met with the patient and discussed plan with care team.  Patient was resting comfortably at time of my evaluation.  Stated her headache was gone, I discussed plan and likely clinical trajectory, patient was in agreement.  7:51 AM per nursing report patient has been accepted for inpatient psychiatric placement at [facility], patient has been accepted by Dr. Nielsen.    8:40 AM Patient transferred in stable condition.      Impression/Plan:  1. Suicidal ideation        MD Francisco TEMPLE Derrick, MD  09/11/24 4064

## 2024-09-11 NOTE — PLAN OF CARE
Robert Steele  September 10, 2024  Plan of Care Hand-off Note     Patient Care Path: inpatient mental health    Plan for Care:   It is the recommendation of this clinician that pt admit to IP MH for safety and stabilization. Pt displays the following risk factors that support IP admission: Pt presents with suicide ideation and no plan. Pt is unable to engage in safety planning to mitigate risk level in a non-secure setting, Patient has attempted to overdose in the past at her families house when in this state of mind. Lower levels of care are not sufficient. Due to this IP is the least restrictive option of care for pt. Pt should remain in IP until deemed safe to return to the community and engage in OP MH supports. Pt would benefit from medication and therapy appointments and outpatient programmatic care coordination upon discharge.    Identified Goals and Safety Issues: Admit to inpatient mental health for safety and stabilization. Patient has a  to connect with when she is more stable.     Overview:  Elle Galloway (Mother)  [phone] Joyce Lopez mother (Patient has number in her phone)          Legal Status: Legal Status at Admission: Voluntary/Patient has signed consent for treatment    Psychiatry Consult:       Updated Dr. Kim regarding plan of care.           ANTELMO GaySW

## 2024-09-11 NOTE — ED NOTES
IP MH Referral Acuity Rating Score (RARS)    LMHP complete at referral to IP MH, with DEC; and, daily while awaiting IP MH placement. Call score to PPS.  CRITERIA SCORING   New 70 HH and Involuntary for IP MH (not adolescent) 0/1   Boarding over 24 hours 0/1   Vulnerable adult at least 55+ with multiple co morbidities; or, Patient age 11 or under 0/1   Suicide ideation without relief of precipitating factors 1/1   Current plan for suicide 0/1   Current plan for homicide 0/1   Imminent risk or actual attempt to seriously harm another without relief of factors precipitating the attempt 0/1   Severe dysfunction in daily living (ex: complete neglect for self care, extreme disruption in vegetative function, extreme deterioration in social interactions) 0/1   Recent (last 2 weeks) or current physical aggression in the ED 0/1   Restraints or seclusion episode in ED 0/1   Verbal aggression, agitation, yelling, etc., while in the ED 0/1   Active psychosis with psychomotor agitation or catatonia 0/1   Need for constant or near constant redirection (from leaving, from others, etc).  0/1   Intrusive or disruptive behaviors 0/1   TOTAL Acuity Total Score: 1
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
## 2024-09-29 ENCOUNTER — HEALTH MAINTENANCE LETTER (OUTPATIENT)
Age: 24
End: 2024-09-29